# Patient Record
Sex: MALE | Race: BLACK OR AFRICAN AMERICAN | NOT HISPANIC OR LATINO | Employment: FULL TIME | URBAN - METROPOLITAN AREA
[De-identification: names, ages, dates, MRNs, and addresses within clinical notes are randomized per-mention and may not be internally consistent; named-entity substitution may affect disease eponyms.]

---

## 2017-03-23 ENCOUNTER — HISTORICAL (OUTPATIENT)
Dept: ADMINISTRATIVE | Facility: HOSPITAL | Age: 27
End: 2017-03-23

## 2017-07-11 ENCOUNTER — HISTORICAL (OUTPATIENT)
Dept: ADMINISTRATIVE | Facility: HOSPITAL | Age: 27
End: 2017-07-11

## 2017-07-11 LAB
ABS NEUT (OLG): 1.82 X10(3)/MCL (ref 2.1–9.2)
ABS NEUT (OLG): 1.88 X10(3)/MCL (ref 2.1–9.2)
ALBUMIN SERPL-MCNC: 4.2 GM/DL (ref 3.4–5)
ALBUMIN/GLOB SERPL: 1 RATIO (ref 1–2)
ALP SERPL-CCNC: 70 UNIT/L (ref 45–117)
ALT SERPL-CCNC: 19 UNIT/L (ref 12–78)
AST SERPL-CCNC: 26 UNIT/L (ref 15–37)
BASOPHILS # BLD AUTO: 0.04 X10(3)/MCL
BASOPHILS # BLD AUTO: 0.04 X10(3)/MCL
BASOPHILS NFR BLD AUTO: 1 % (ref 0–1)
BASOPHILS NFR BLD AUTO: 1 % (ref 0–1)
BILIRUB SERPL-MCNC: 0.9 MG/DL (ref 0.2–1)
BILIRUBIN DIRECT+TOT PNL SERPL-MCNC: 0.2 MG/DL
BILIRUBIN DIRECT+TOT PNL SERPL-MCNC: 0.7 MG/DL
BUN SERPL-MCNC: 11 MG/DL (ref 7–18)
CALCIUM SERPL-MCNC: 8.8 MG/DL (ref 8.5–10.1)
CD3+CD4+ CELLS # SPEC: 826 UNIT/L (ref 589–1505)
CD3+CD4+ CELLS NFR BLD: 35.9 % (ref 31–59)
CHLORIDE SERPL-SCNC: 104 MMOL/L (ref 98–107)
CO2 SERPL-SCNC: 28 MMOL/L (ref 21–32)
CREAT SERPL-MCNC: 1.2 MG/DL (ref 0.6–1.3)
EOSINOPHIL # BLD AUTO: 0.14 X10(3)/MCL
EOSINOPHIL # BLD AUTO: 0.16 10*3/UL
EOSINOPHIL NFR BLD AUTO: 3 % (ref 0–5)
EOSINOPHIL NFR BLD AUTO: 3 % (ref 0–5)
ERYTHROCYTE [DISTWIDTH] IN BLOOD BY AUTOMATED COUNT: 12.6 % (ref 11.5–14.5)
ERYTHROCYTE [DISTWIDTH] IN BLOOD BY AUTOMATED COUNT: 12.8 % (ref 11.5–14.5)
GLOBULIN SER-MCNC: 3.8 GM/ML (ref 2.3–3.5)
GLUCOSE SERPL-MCNC: 82 MG/DL (ref 74–106)
HCT VFR BLD AUTO: 43.6 % (ref 40–51)
HCT VFR BLD AUTO: 43.8 % (ref 40–51)
HGB BLD-MCNC: 15.1 GM/DL (ref 13.5–17.5)
HGB BLD-MCNC: 15.1 GM/DL (ref 13.5–17.5)
IMM GRANULOCYTES # BLD AUTO: 0.02 10*3/UL
IMM GRANULOCYTES NFR BLD AUTO: 0 %
LYMPHOCYTES # BLD AUTO: 2.33 X10(3)/MCL
LYMPHOCYTES # BLD AUTO: 2.38 X10(3)/MCL
LYMPHOCYTES # BLD AUTO: 2300 UNIT/L (ref 1260–5520)
LYMPHOCYTES NFR BLD AUTO: 50 % (ref 15–40)
LYMPHOCYTES NFR BLD AUTO: 50 % (ref 15–40)
LYMPHOCYTES NFR LN MANUAL: 50 % (ref 28–48)
LYMPHOMA - T-CELL MARKERS SPEC-IMP: ABNORMAL
MCH RBC QN AUTO: 31.4 PG (ref 26–34)
MCH RBC QN AUTO: 31.8 PG (ref 26–34)
MCHC RBC AUTO-ENTMCNC: 34.5 GM/DL (ref 31–37)
MCHC RBC AUTO-ENTMCNC: 34.6 GM/DL (ref 31–37)
MCV RBC AUTO: 91.1 FL (ref 80–100)
MCV RBC AUTO: 91.8 FL (ref 80–100)
MONOCYTES # BLD AUTO: 0.29 X10(3)/MCL
MONOCYTES # BLD AUTO: 0.32 X10(3)/MCL
MONOCYTES NFR BLD AUTO: 6 % (ref 4–12)
MONOCYTES NFR BLD AUTO: 7 % (ref 4–12)
NEUTROPHILS # BLD AUTO: 1.82 X10(3)/MCL
NEUTROPHILS # BLD AUTO: 1.88 X10(3)/MCL
NEUTROPHILS NFR BLD AUTO: 39 X10(3)/MCL
NEUTROPHILS NFR BLD AUTO: 39 X10(3)/MCL
PLATELET # BLD AUTO: 256 X10(3)/MCL (ref 130–400)
PLATELET # BLD AUTO: 259 X10(3)/MCL (ref 130–400)
PMV BLD AUTO: 9.4 FL (ref 7.4–10.4)
PMV BLD AUTO: 9.4 FL (ref 7.4–10.4)
POTASSIUM SERPL-SCNC: 4.3 MMOL/L (ref 3.5–5.1)
PROT SERPL-MCNC: 8 GM/DL (ref 6.4–8.2)
RBC # BLD AUTO: 4.75 X10(6)/MCL (ref 4.5–5.9)
RBC # BLD AUTO: 4.81 X10(6)/MCL (ref 4.5–5.9)
SODIUM SERPL-SCNC: 141 MMOL/L (ref 136–145)
WBC # BLD AUTO: 4600 /MM3 (ref 4500–11500)
WBC # SPEC AUTO: 4.6 X10(3)/MCL (ref 4.5–11)
WBC # SPEC AUTO: 4.8 X10(3)/MCL (ref 4.5–11)

## 2017-11-13 ENCOUNTER — HISTORICAL (OUTPATIENT)
Dept: ADMINISTRATIVE | Facility: HOSPITAL | Age: 27
End: 2017-11-13

## 2017-11-13 LAB
ABS NEUT (OLG): 2.32 X10(3)/MCL (ref 2.1–9.2)
ABS NEUT (OLG): 2.35 X10(3)/MCL (ref 2.1–9.2)
ALBUMIN SERPL-MCNC: 4 GM/DL (ref 3.4–5)
ALBUMIN/GLOB SERPL: 1 RATIO (ref 1–2)
ALP SERPL-CCNC: 106 UNIT/L (ref 45–117)
ALT SERPL-CCNC: 22 UNIT/L (ref 12–78)
AST SERPL-CCNC: 23 UNIT/L (ref 15–37)
BASOPHILS # BLD AUTO: 0.03 X10(3)/MCL
BASOPHILS # BLD AUTO: 0.04 X10(3)/MCL
BASOPHILS NFR BLD AUTO: 1 % (ref 0–1)
BASOPHILS NFR BLD AUTO: 1 % (ref 0–1)
BILIRUB SERPL-MCNC: 0.9 MG/DL (ref 0.2–1)
BILIRUBIN DIRECT+TOT PNL SERPL-MCNC: 0.2 MG/DL
BILIRUBIN DIRECT+TOT PNL SERPL-MCNC: 0.7 MG/DL
BUN SERPL-MCNC: 14 MG/DL (ref 7–18)
CALCIUM SERPL-MCNC: 9.1 MG/DL (ref 8.5–10.1)
CD3+CD4+ CELLS # SPEC: 714 UNIT/L (ref 589–1505)
CD3+CD4+ CELLS NFR BLD: 36.3 % (ref 31–59)
CHLORIDE SERPL-SCNC: 105 MMOL/L (ref 98–107)
CO2 SERPL-SCNC: 28 MMOL/L (ref 21–32)
CREAT SERPL-MCNC: 1.2 MG/DL (ref 0.6–1.3)
EOSINOPHIL # BLD AUTO: 0.08 10*3/UL
EOSINOPHIL # BLD AUTO: 0.08 X10(3)/MCL
EOSINOPHIL NFR BLD AUTO: 2 % (ref 0–5)
EOSINOPHIL NFR BLD AUTO: 2 % (ref 0–5)
ERYTHROCYTE [DISTWIDTH] IN BLOOD BY AUTOMATED COUNT: 12.3 % (ref 11.5–14.5)
ERYTHROCYTE [DISTWIDTH] IN BLOOD BY AUTOMATED COUNT: 12.5 % (ref 11.5–14.5)
GLOBULIN SER-MCNC: 4.2 GM/ML (ref 2.3–3.5)
GLUCOSE SERPL-MCNC: 111 MG/DL (ref 74–106)
HCT VFR BLD AUTO: 43.7 % (ref 40–51)
HCT VFR BLD AUTO: 44.5 % (ref 40–51)
HGB BLD-MCNC: 15.1 GM/DL (ref 13.5–17.5)
HGB BLD-MCNC: 15.2 GM/DL (ref 13.5–17.5)
IMM GRANULOCYTES # BLD AUTO: 0.01 10*3/UL
IMM GRANULOCYTES # BLD AUTO: 0.01 10*3/UL
IMM GRANULOCYTES NFR BLD AUTO: 0 %
IMM GRANULOCYTES NFR BLD AUTO: 0 %
LYMPHOCYTES # BLD AUTO: 1.94 X10(3)/MCL
LYMPHOCYTES # BLD AUTO: 1.97 X10(3)/MCL
LYMPHOCYTES # BLD AUTO: 1968 UNIT/L (ref 1260–5520)
LYMPHOCYTES NFR BLD AUTO: 41 % (ref 15–40)
LYMPHOCYTES NFR BLD AUTO: 41 % (ref 15–40)
LYMPHOCYTES NFR LN MANUAL: 41 % (ref 28–48)
LYMPHOMA - T-CELL MARKERS SPEC-IMP: NORMAL
MCH RBC QN AUTO: 31.7 PG (ref 26–34)
MCH RBC QN AUTO: 32.4 PG (ref 26–34)
MCHC RBC AUTO-ENTMCNC: 33.9 GM/DL (ref 31–37)
MCHC RBC AUTO-ENTMCNC: 34.8 GM/DL (ref 31–37)
MCV RBC AUTO: 93.2 FL (ref 80–100)
MCV RBC AUTO: 93.3 FL (ref 80–100)
MONOCYTES # BLD AUTO: 0.37 X10(3)/MCL
MONOCYTES # BLD AUTO: 0.37 X10(3)/MCL
MONOCYTES NFR BLD AUTO: 8 % (ref 4–12)
MONOCYTES NFR BLD AUTO: 8 % (ref 4–12)
NEUTROPHILS # BLD AUTO: 2.32 X10(3)/MCL
NEUTROPHILS # BLD AUTO: 2.35 X10(3)/MCL
NEUTROPHILS NFR BLD AUTO: 49 X10(3)/MCL
NEUTROPHILS NFR BLD AUTO: 49 X10(3)/MCL
PLATELET # BLD AUTO: 322 X10(3)/MCL (ref 130–400)
PLATELET # BLD AUTO: 333 X10(3)/MCL (ref 130–400)
PMV BLD AUTO: 9.1 FL (ref 7.4–10.4)
PMV BLD AUTO: 9.2 FL (ref 7.4–10.4)
POTASSIUM SERPL-SCNC: 4 MMOL/L (ref 3.5–5.1)
PROT SERPL-MCNC: 8.2 GM/DL (ref 6.4–8.2)
RBC # BLD AUTO: 4.69 X10(6)/MCL (ref 4.5–5.9)
RBC # BLD AUTO: 4.77 X10(6)/MCL (ref 4.5–5.9)
SODIUM SERPL-SCNC: 138 MMOL/L (ref 136–145)
WBC # BLD AUTO: 4800 /MM3 (ref 4500–11500)
WBC # SPEC AUTO: 4.8 X10(3)/MCL (ref 4.5–11)
WBC # SPEC AUTO: 4.8 X10(3)/MCL (ref 4.5–11)

## 2018-04-03 ENCOUNTER — HISTORICAL (OUTPATIENT)
Dept: ADMINISTRATIVE | Facility: HOSPITAL | Age: 28
End: 2018-04-03

## 2018-04-03 LAB
ABS NEUT (OLG): 1.85 X10(3)/MCL (ref 2.1–9.2)
ABS NEUT (OLG): 1.96 X10(3)/MCL (ref 2.1–9.2)
ALBUMIN SERPL-MCNC: 3.9 GM/DL (ref 3.4–5)
ALBUMIN/GLOB SERPL: 1 RATIO (ref 1–2)
ALP SERPL-CCNC: 92 UNIT/L (ref 45–117)
ALT SERPL-CCNC: 39 UNIT/L (ref 12–78)
APPEARANCE, UA: CLEAR
AST SERPL-CCNC: 30 UNIT/L (ref 15–37)
BACTERIA #/AREA URNS AUTO: ABNORMAL /[HPF]
BASOPHILS # BLD AUTO: 0.04 X10(3)/MCL
BASOPHILS # BLD AUTO: 0.04 X10(3)/MCL
BASOPHILS NFR BLD AUTO: 1 %
BASOPHILS NFR BLD AUTO: 1 %
BILIRUB SERPL-MCNC: 0.9 MG/DL (ref 0.2–1)
BILIRUB UR QL STRIP: NEGATIVE
BILIRUBIN DIRECT+TOT PNL SERPL-MCNC: 0.3 MG/DL
BILIRUBIN DIRECT+TOT PNL SERPL-MCNC: 0.6 MG/DL
BUN SERPL-MCNC: 13 MG/DL (ref 7–18)
CALCIUM SERPL-MCNC: 8.3 MG/DL (ref 8.5–10.1)
CD3+CD4+ CELLS # SPEC: 665 UNIT/L (ref 589–1505)
CD3+CD4+ CELLS NFR BLD: 29.5 % (ref 31–59)
CHLORIDE SERPL-SCNC: 109 MMOL/L (ref 98–107)
CHOLEST SERPL-MCNC: 124 MG/DL
CHOLEST/HDLC SERPL: 2.2 {RATIO} (ref 0–5)
CO2 SERPL-SCNC: 29 MMOL/L (ref 21–32)
COLOR UR: ABNORMAL
CREAT SERPL-MCNC: 1.2 MG/DL (ref 0.6–1.3)
DEPRECATED CALCIDIOL+CALCIFEROL SERPL-MC: 24.66 NG/ML (ref 30–80)
EOSINOPHIL # BLD AUTO: 0.1 X10(3)/MCL
EOSINOPHIL # BLD AUTO: 0.1 X10(3)/MCL
EOSINOPHIL NFR BLD AUTO: 2 %
EOSINOPHIL NFR BLD AUTO: 2 %
ERYTHROCYTE [DISTWIDTH] IN BLOOD BY AUTOMATED COUNT: 12.9 % (ref 11.5–14.5)
ERYTHROCYTE [DISTWIDTH] IN BLOOD BY AUTOMATED COUNT: 13 % (ref 11.5–14.5)
GLOBULIN SER-MCNC: 4.1 GM/ML (ref 2.3–3.5)
GLUCOSE (UA): NORMAL
GLUCOSE SERPL-MCNC: 91 MG/DL (ref 74–106)
HCT VFR BLD AUTO: 43.6 % (ref 40–51)
HCT VFR BLD AUTO: 43.9 % (ref 40–51)
HDLC SERPL-MCNC: 57 MG/DL
HGB BLD-MCNC: 14.8 GM/DL (ref 13.5–17.5)
HGB BLD-MCNC: 14.9 GM/DL (ref 13.5–17.5)
HGB UR QL STRIP: NEGATIVE
HYALINE CASTS #/AREA URNS LPF: ABNORMAL /[LPF]
IMM GRANULOCYTES # BLD AUTO: 0.01 10*3/UL
IMM GRANULOCYTES # BLD AUTO: 0.01 10*3/UL
IMM GRANULOCYTES NFR BLD AUTO: 0 %
IMM GRANULOCYTES NFR BLD AUTO: 0 %
KETONES UR QL STRIP: NEGATIVE
LDLC SERPL CALC-MCNC: 58 MG/DL (ref 0–130)
LEUKOCYTE ESTERASE UR QL STRIP: NEGATIVE
LYMPHOCYTES # BLD AUTO: 2.15 X10(3)/MCL
LYMPHOCYTES # BLD AUTO: 2.24 X10(3)/MCL
LYMPHOCYTES # BLD AUTO: 2254 UNIT/L (ref 1260–5520)
LYMPHOCYTES NFR BLD AUTO: 48 % (ref 13–40)
LYMPHOCYTES NFR BLD AUTO: 49 % (ref 13–40)
LYMPHOCYTES NFR LN MANUAL: 49 % (ref 28–48)
LYMPHOMA - T-CELL MARKERS SPEC-IMP: ABNORMAL
MCH RBC QN AUTO: 31.8 PG (ref 26–34)
MCH RBC QN AUTO: 31.8 PG (ref 26–34)
MCHC RBC AUTO-ENTMCNC: 33.9 GM/DL (ref 31–37)
MCHC RBC AUTO-ENTMCNC: 33.9 GM/DL (ref 31–37)
MCV RBC AUTO: 93.8 FL (ref 80–100)
MCV RBC AUTO: 93.8 FL (ref 80–100)
MONOCYTES # BLD AUTO: 0.26 X10(3)/MCL
MONOCYTES # BLD AUTO: 0.29 X10(3)/MCL
MONOCYTES NFR BLD AUTO: 6 % (ref 4–12)
MONOCYTES NFR BLD AUTO: 6 % (ref 4–12)
NEG CONT SPOT COUNT: NORMAL
NEUTROPHILS # BLD AUTO: 1.85 X10(3)/MCL
NEUTROPHILS # BLD AUTO: 1.96 X10(3)/MCL
NEUTROPHILS NFR BLD AUTO: 42 X10(3)/MCL
NEUTROPHILS NFR BLD AUTO: 42 X10(3)/MCL
NITRITE UR QL STRIP: NEGATIVE
PANEL B SPOT COUNT: 0
PH UR STRIP: 7 [PH] (ref 4.5–8)
PLATELET # BLD AUTO: 267 X10(3)/MCL (ref 130–400)
PLATELET # BLD AUTO: 276 X10(3)/MCL (ref 130–400)
PMV BLD AUTO: 9.4 FL (ref 7.4–10.4)
PMV BLD AUTO: 9.4 FL (ref 7.4–10.4)
POS CONT SPOT COUNT: NORMAL
POTASSIUM SERPL-SCNC: 4.3 MMOL/L (ref 3.5–5.1)
PROT SERPL-MCNC: 8 GM/DL (ref 6.4–8.2)
PROT UR QL STRIP: NEGATIVE
RBC # BLD AUTO: 4.65 X10(6)/MCL (ref 4.5–5.9)
RBC # BLD AUTO: 4.68 X10(6)/MCL (ref 4.5–5.9)
RBC #/AREA URNS AUTO: ABNORMAL /[HPF]
SODIUM SERPL-SCNC: 142 MMOL/L (ref 136–145)
SP GR UR STRIP: 1.02 (ref 1–1.03)
SQUAMOUS #/AREA URNS LPF: ABNORMAL /[LPF]
T PALLIDUM AB SER QL: NONREACTIVE
T-SPOT.TB: NEGATIVE
TRIGL SERPL-MCNC: 44 MG/DL
TSH SERPL-ACNC: 2.6 MIU/L (ref 0.36–3.74)
UROBILINOGEN UR STRIP-ACNC: 2 MG/DL
VLDLC SERPL CALC-MCNC: 9 MG/DL
WBC # BLD AUTO: 4600 /MM3 (ref 4500–11500)
WBC # SPEC AUTO: 4.4 X10(3)/MCL (ref 4.5–11)
WBC # SPEC AUTO: 4.6 X10(3)/MCL (ref 4.5–11)
WBC #/AREA URNS AUTO: ABNORMAL /HPF

## 2019-02-27 ENCOUNTER — HISTORICAL (OUTPATIENT)
Dept: RADIOLOGY | Facility: HOSPITAL | Age: 29
End: 2019-02-27

## 2019-03-01 ENCOUNTER — HISTORICAL (OUTPATIENT)
Dept: ADMINISTRATIVE | Facility: HOSPITAL | Age: 29
End: 2019-03-01

## 2019-03-01 LAB
ABS NEUT (OLG): 2.89 X10(3)/MCL (ref 2.1–9.2)
ALBUMIN SERPL-MCNC: 3.2 GM/DL (ref 3.4–5)
ALBUMIN/GLOB SERPL: 0.6 RATIO (ref 1.1–2)
ALP SERPL-CCNC: 372 UNIT/L (ref 45–117)
ALT SERPL-CCNC: 420 UNIT/L (ref 12–78)
AMPHET UR QL SCN: NEGATIVE
APAP SERPL-MCNC: <2 MCG/ML (ref 10–30)
APPEARANCE, UA: CLEAR
AST SERPL-CCNC: 253 UNIT/L (ref 15–37)
BACTERIA #/AREA URNS AUTO: ABNORMAL /[HPF]
BARBITURATE SCN PRESENT UR: NEGATIVE
BASOPHILS # BLD AUTO: 0.02 X10(3)/MCL
BASOPHILS NFR BLD AUTO: 0 %
BENZODIAZ UR QL SCN: NEGATIVE
BILIRUB SERPL-MCNC: 1 MG/DL (ref 0.2–1)
BILIRUB UR QL STRIP: NEGATIVE
BILIRUBIN DIRECT+TOT PNL SERPL-MCNC: 0.3 MG/DL
BILIRUBIN DIRECT+TOT PNL SERPL-MCNC: 0.7 MG/DL
BUN SERPL-MCNC: 10 MG/DL (ref 7–18)
CALCIUM SERPL-MCNC: 8.9 MG/DL (ref 8.5–10.1)
CANNABINOIDS UR QL SCN: NEGATIVE
CHLORIDE SERPL-SCNC: 102 MMOL/L (ref 98–107)
CHOLEST SERPL-MCNC: 155 MG/DL
CHOLEST/HDLC SERPL: 5.5 {RATIO} (ref 0–5)
CO2 SERPL-SCNC: 27 MMOL/L (ref 21–32)
COCAINE UR QL SCN: NEGATIVE
COLOR UR: YELLOW
CREAT SERPL-MCNC: 1.1 MG/DL (ref 0.6–1.3)
CREAT UR-MCNC: 320 MG/DL
CRP SERPL-MCNC: 3.9 MG/DL
DEPRECATED CALCIDIOL+CALCIFEROL SERPL-MC: 11.56 NG/ML (ref 30–80)
EOSINOPHIL # BLD AUTO: 0.04 X10(3)/MCL
EOSINOPHIL NFR BLD AUTO: 1 %
ERYTHROCYTE [DISTWIDTH] IN BLOOD BY AUTOMATED COUNT: 14.1 % (ref 11.5–14.5)
ERYTHROCYTE [SEDIMENTATION RATE] IN BLOOD: 58 MM/HR (ref 0–15)
FERRITIN SERPL-MCNC: 217.4 NG/ML (ref 26–388)
GGT SERPL-CCNC: 235 UNIT/L (ref 5–85)
GLOBULIN SER-MCNC: 5.6 GM/ML (ref 2.3–3.5)
GLUCOSE (UA): NORMAL
GLUCOSE SERPL-MCNC: 81 MG/DL (ref 74–106)
HAV IGM SERPL QL IA: NONREACTIVE
HBV CORE IGM SERPL QL IA: NONREACTIVE
HBV SURFACE AG SERPL QL IA: NEGATIVE
HCT VFR BLD AUTO: 41.9 % (ref 40–51)
HCV AB SERPL QL IA: NONREACTIVE
HCV AB SERPL QL IA: NONREACTIVE
HDLC SERPL-MCNC: 28 MG/DL
HGB BLD-MCNC: 13.5 GM/DL (ref 13.5–17.5)
HGB UR QL STRIP: 0.1 MG/DL
HYALINE CASTS #/AREA URNS LPF: ABNORMAL /[LPF]
IMM GRANULOCYTES # BLD AUTO: 0.01 10*3/UL
IMM GRANULOCYTES NFR BLD AUTO: 0 %
IRON SATN MFR SERPL: 14 % (ref 15–50)
IRON SERPL-MCNC: 42 MCG/DL (ref 65–175)
KETONES UR QL STRIP: NEGATIVE
LDLC SERPL CALC-MCNC: 109 MG/DL (ref 0–130)
LEUKOCYTE ESTERASE UR QL STRIP: NEGATIVE
LIPASE SERPL-CCNC: 116 UNIT/L (ref 73–393)
LYMPHOCYTES # BLD AUTO: 1.72 X10(3)/MCL
LYMPHOCYTES NFR BLD AUTO: 34 % (ref 13–40)
MCH RBC QN AUTO: 28.4 PG (ref 26–34)
MCHC RBC AUTO-ENTMCNC: 32.2 GM/DL (ref 31–37)
MCV RBC AUTO: 88.2 FL (ref 80–100)
MONOCYTES # BLD AUTO: 0.43 X10(3)/MCL
MONOCYTES NFR BLD AUTO: 8 % (ref 4–12)
NEG CONT SPOT COUNT: NORMAL
NEUTROPHILS # BLD AUTO: 2.89 X10(3)/MCL
NEUTROPHILS NFR BLD AUTO: 56 X10(3)/MCL
NITRITE UR QL STRIP: NEGATIVE
OPIATES UR QL SCN: NEGATIVE
PANEL A SPOT COUNT: 0
PANEL B SPOT COUNT: 0
PCP UR QL: NEGATIVE
PH UR STRIP: 6.5 [PH] (ref 4.5–8)
PLATELET # BLD AUTO: 330 X10(3)/MCL (ref 130–400)
PMV BLD AUTO: 9.1 FL (ref 7.4–10.4)
POS CONT SPOT COUNT: NORMAL
POTASSIUM SERPL-SCNC: 4.5 MMOL/L (ref 3.5–5.1)
PROT SERPL-MCNC: 8.8 GM/DL (ref 6.4–8.2)
PROT UR QL STRIP: 300 MG/DL
PROT UR STRIP-MCNC: 605.6 MG/DL
RBC # BLD AUTO: 4.75 X10(6)/MCL (ref 4.5–5.9)
RBC #/AREA URNS AUTO: ABNORMAL /[HPF]
RET# (OHS): 0.08 X10(6)/MCL (ref 0.02–0.09)
RETICULOCYTE COUNT AUTOMATED (OLG): 1.9 % (ref 0.5–1.5)
RPR SER QL: REACTIVE
SALICYLATES SERPL-MCNC: <1.7 MG/DL (ref 2.8–20)
SODIUM SERPL-SCNC: 134 MMOL/L (ref 136–145)
SP GR UR STRIP: 1.02 (ref 1–1.03)
SQUAMOUS #/AREA URNS LPF: ABNORMAL /[LPF]
T PALLIDUM AB SER QL: REACTIVE
T-SPOT.TB: NORMAL
TIBC SERPL-MCNC: 301 MCG/DL (ref 250–450)
TRANSFERRIN SERPL-MCNC: 235 MG/DL (ref 200–360)
TRIGL SERPL-MCNC: 91 MG/DL
TSH SERPL-ACNC: 2.81 MIU/L (ref 0.36–3.74)
UROBILINOGEN UR STRIP-ACNC: 3 MG/DL
VLDLC SERPL CALC-MCNC: 18 MG/DL
WBC # SPEC AUTO: 5.1 X10(3)/MCL (ref 4.5–11)
WBC #/AREA URNS AUTO: ABNORMAL /HPF

## 2019-03-22 ENCOUNTER — HISTORICAL (OUTPATIENT)
Dept: RADIOLOGY | Facility: HOSPITAL | Age: 29
End: 2019-03-22

## 2019-03-25 ENCOUNTER — HISTORICAL (OUTPATIENT)
Dept: ADMINISTRATIVE | Facility: HOSPITAL | Age: 29
End: 2019-03-25

## 2019-03-25 LAB
ABS NEUT (OLG): 1.18 X10(3)/MCL (ref 2.1–9.2)
ALBUMIN SERPL-MCNC: 3.2 GM/DL (ref 3.4–5)
ALBUMIN/GLOB SERPL: 0.6 RATIO (ref 1.1–2)
ALP SERPL-CCNC: 151 UNIT/L (ref 45–117)
ALT SERPL-CCNC: 47 UNIT/L (ref 12–78)
AST SERPL-CCNC: 29 UNIT/L (ref 15–37)
BASOPHILS # BLD AUTO: 0.04 X10(3)/MCL
BASOPHILS NFR BLD AUTO: 1 %
BILIRUB SERPL-MCNC: 0.7 MG/DL (ref 0.2–1)
BILIRUBIN DIRECT+TOT PNL SERPL-MCNC: 0.2 MG/DL
BILIRUBIN DIRECT+TOT PNL SERPL-MCNC: 0.5 MG/DL
BUN SERPL-MCNC: 12 MG/DL (ref 7–18)
CALCIUM SERPL-MCNC: 8.9 MG/DL (ref 8.5–10.1)
CD3+CD4+ CELLS # SPEC: 529 UNIT/L (ref 589–1505)
CD3+CD4+ CELLS NFR BLD: 22.6 % (ref 31–59)
CHLORIDE SERPL-SCNC: 104 MMOL/L (ref 98–107)
CO2 SERPL-SCNC: 31 MMOL/L (ref 21–32)
CREAT SERPL-MCNC: 1.1 MG/DL (ref 0.6–1.3)
EOSINOPHIL # BLD AUTO: 0.1 10*3/UL
EOSINOPHIL NFR BLD AUTO: 2 %
ERYTHROCYTE [DISTWIDTH] IN BLOOD BY AUTOMATED COUNT: 12.7 % (ref 11.5–14.5)
GLOBULIN SER-MCNC: 5.1 GM/ML (ref 2.3–3.5)
GLUCOSE SERPL-MCNC: 94 MG/DL (ref 74–106)
HCT VFR BLD AUTO: 45.3 % (ref 40–51)
HGB BLD-MCNC: 14.6 GM/DL (ref 13.5–17.5)
IMM GRANULOCYTES # BLD AUTO: 0.01 10*3/UL
IMM GRANULOCYTES NFR BLD AUTO: 0 %
LYMPHOCYTES # BLD AUTO: 2.35 X10(3)/MCL
LYMPHOCYTES # BLD AUTO: 2340 UNIT/L (ref 1260–5520)
LYMPHOCYTES NFR BLD AUTO: 60 % (ref 13–40)
LYMPHOCYTES NFR LN MANUAL: 60 % (ref 28–48)
LYMPHOMA - T-CELL MARKERS SPEC-IMP: ABNORMAL
MCH RBC QN AUTO: 27.9 PG (ref 26–34)
MCHC RBC AUTO-ENTMCNC: 32.2 GM/DL (ref 31–37)
MCV RBC AUTO: 86.6 FL (ref 80–100)
MONOCYTES # BLD AUTO: 0.26 X10(3)/MCL
MONOCYTES NFR BLD AUTO: 7 % (ref 4–12)
NEUTROPHILS # BLD AUTO: 1.18 X10(3)/MCL
NEUTROPHILS NFR BLD AUTO: 30 X10(3)/MCL
PLATELET # BLD AUTO: 296 X10(3)/MCL (ref 130–400)
PMV BLD AUTO: 9.5 FL (ref 7.4–10.4)
POTASSIUM SERPL-SCNC: 4.2 MMOL/L (ref 3.5–5.1)
PROT SERPL-MCNC: 8.3 GM/DL (ref 6.4–8.2)
RBC # BLD AUTO: 5.23 X10(6)/MCL (ref 4.5–5.9)
SODIUM SERPL-SCNC: 136 MMOL/L (ref 136–145)
WBC # BLD AUTO: 3900 /MM3 (ref 4500–11500)
WBC # SPEC AUTO: 3.9 X10(3)/MCL (ref 4.5–11)

## 2019-11-04 ENCOUNTER — HISTORICAL (OUTPATIENT)
Dept: ADMINISTRATIVE | Facility: HOSPITAL | Age: 29
End: 2019-11-04

## 2019-11-04 LAB
ABS NEUT (OLG): 2.36 X10(3)/MCL (ref 2.1–9.2)
ALBUMIN SERPL-MCNC: 4.1 GM/DL (ref 3.4–5)
ALBUMIN/GLOB SERPL: 0.8 RATIO (ref 1.1–2)
ALP SERPL-CCNC: 88 UNIT/L (ref 45–117)
ALT SERPL-CCNC: 21 UNIT/L (ref 12–78)
AST SERPL-CCNC: 22 UNIT/L (ref 15–37)
BASOPHILS # BLD AUTO: 0 X10(3)/MCL (ref 0–0.2)
BASOPHILS NFR BLD AUTO: 0 %
BILIRUB SERPL-MCNC: 0.8 MG/DL (ref 0.2–1)
BILIRUBIN DIRECT+TOT PNL SERPL-MCNC: 0.2 MG/DL (ref 0–0.2)
BILIRUBIN DIRECT+TOT PNL SERPL-MCNC: 0.6 MG/DL
BUN SERPL-MCNC: 14 MG/DL (ref 7–18)
CALCIUM SERPL-MCNC: 9.1 MG/DL (ref 8.5–10.1)
CD3+CD4+ CELLS # SPEC: 376 UNIT/L (ref 589–1505)
CD3+CD4+ CELLS NFR BLD: 21.4 % (ref 31–59)
CHLORIDE SERPL-SCNC: 107 MMOL/L (ref 98–107)
CO2 SERPL-SCNC: 29 MMOL/L (ref 21–32)
CREAT SERPL-MCNC: 1.2 MG/DL (ref 0.6–1.3)
EOSINOPHIL # BLD AUTO: 0.1 X10(3)/MCL (ref 0–0.9)
EOSINOPHIL NFR BLD AUTO: 2 %
ERYTHROCYTE [DISTWIDTH] IN BLOOD BY AUTOMATED COUNT: 12.5 % (ref 11.5–14.5)
GLOBULIN SER-MCNC: 5 GM/ML (ref 2.3–3.5)
GLUCOSE SERPL-MCNC: 92 MG/DL (ref 74–106)
HCT VFR BLD AUTO: 48.1 % (ref 40–51)
HGB BLD-MCNC: 15.3 GM/DL (ref 13.5–17.5)
LYMPHOCYTES # BLD AUTO: 1.8 X10(3)/MCL (ref 0.6–4.6)
LYMPHOCYTES # BLD AUTO: 1755 UNIT/L (ref 1260–5520)
LYMPHOCYTES NFR BLD AUTO: 39 %
LYMPHOCYTES NFR LN MANUAL: 39 % (ref 28–48)
LYMPHOMA - T-CELL MARKERS SPEC-IMP: ABNORMAL
MCH RBC QN AUTO: 28.8 PG (ref 26–34)
MCHC RBC AUTO-ENTMCNC: 31.8 GM/DL (ref 31–37)
MCV RBC AUTO: 90.4 FL (ref 80–100)
MONOCYTES # BLD AUTO: 0.3 X10(3)/MCL (ref 0.1–1.3)
MONOCYTES NFR BLD AUTO: 6 %
NEUTROPHILS # BLD AUTO: 2.36 X10(3)/MCL (ref 2.1–9.2)
NEUTROPHILS NFR BLD AUTO: 52 %
PLATELET # BLD AUTO: 265 X10(3)/MCL (ref 130–400)
PMV BLD AUTO: 9.4 FL (ref 7.4–10.4)
POTASSIUM SERPL-SCNC: 4 MMOL/L (ref 3.5–5.1)
PROT SERPL-MCNC: 9.1 GM/DL (ref 6.4–8.2)
RBC # BLD AUTO: 5.32 X10(6)/MCL (ref 4.5–5.9)
RPR SER QL: REACTIVE
SODIUM SERPL-SCNC: 139 MMOL/L (ref 136–145)
T PALLIDUM AB SER QL: REACTIVE
WBC # BLD AUTO: 4500 /MM3 (ref 4500–11500)
WBC # SPEC AUTO: 4.5 X10(3)/MCL (ref 4.5–11)

## 2020-10-15 ENCOUNTER — HISTORICAL (OUTPATIENT)
Dept: ADMINISTRATIVE | Facility: HOSPITAL | Age: 30
End: 2020-10-15

## 2020-10-15 LAB
ABS NEUT (OLG): 0.91 X10(3)/MCL (ref 2.1–9.2)
ALBUMIN SERPL-MCNC: 3.6 GM/DL (ref 3.4–5)
ALBUMIN/GLOB SERPL: 0.7 RATIO (ref 1.1–2)
ALP SERPL-CCNC: 82 UNIT/L (ref 45–117)
ALT SERPL-CCNC: 20 UNIT/L (ref 12–78)
APPEARANCE, UA: ABNORMAL
AST SERPL-CCNC: 20 UNIT/L (ref 15–37)
BACTERIA #/AREA URNS AUTO: ABNORMAL /HPF
BASOPHILS NFR BLD MANUAL: 0 %
BILIRUB SERPL-MCNC: 0.4 MG/DL (ref 0.2–1)
BILIRUB UR QL STRIP: NEGATIVE
BILIRUBIN DIRECT+TOT PNL SERPL-MCNC: 0.2 MG/DL
BILIRUBIN DIRECT+TOT PNL SERPL-MCNC: 0.2 MG/DL (ref 0–0.2)
BUN SERPL-MCNC: 6 MG/DL (ref 7–18)
CALCIUM SERPL-MCNC: 8.4 MG/DL (ref 8.5–10.1)
CD3+CD4+ CELLS # SPEC: 158 UNIT/L (ref 589–1505)
CD3+CD4+ CELLS NFR BLD: 11.7 % (ref 31–59)
CHLORIDE SERPL-SCNC: 105 MMOL/L (ref 98–107)
CHOLEST SERPL-MCNC: 95 MG/DL
CHOLEST/HDLC SERPL: 3.5 {RATIO} (ref 0–5)
CO2 SERPL-SCNC: 31 MMOL/L (ref 21–32)
COLOR UR: YELLOW
CREAT SERPL-MCNC: 1 MG/DL (ref 0.6–1.3)
DEPRECATED CALCIDIOL+CALCIFEROL SERPL-MC: 27.5 NG/ML (ref 30–80)
EOSINOPHIL NFR BLD MANUAL: 6 %
ERYTHROCYTE [DISTWIDTH] IN BLOOD BY AUTOMATED COUNT: 12.6 % (ref 11.5–14.5)
EST. AVERAGE GLUCOSE BLD GHB EST-MCNC: 131 MG/DL
GLOBULIN SER-MCNC: 5.2 GM/ML (ref 2.3–3.5)
GLUCOSE (UA): NEGATIVE
GLUCOSE SERPL-MCNC: 90 MG/DL (ref 74–106)
GRANULOCYTES NFR BLD MANUAL: 33 % (ref 43–75)
HBA1C MFR BLD: 6.2 % (ref 4.2–6.3)
HBV SURFACE AB SER-ACNC: 456.28 M[IU]/ML
HBV SURFACE AB SERPL IA-ACNC: REACTIVE M[IU]/ML
HBV SURFACE AG SERPL QL IA: NONREACTIVE
HCT VFR BLD AUTO: 37 % (ref 40–51)
HCV AB SERPL QL IA: NONREACTIVE
HDLC SERPL-MCNC: 27 MG/DL (ref 40–59)
HGB BLD-MCNC: 12.2 GM/DL (ref 13.5–17.5)
HGB UR QL STRIP: 0.03 MG/DL
HYALINE CASTS #/AREA URNS LPF: ABNORMAL /LPF
KETONES UR QL STRIP: ABNORMAL
LDLC SERPL CALC-MCNC: 36 MG/DL
LEUKOCYTE ESTERASE UR QL STRIP: 500 LEU/UL
LYMPHOCYTES # BLD AUTO: 1350 UNIT/L (ref 1260–5520)
LYMPHOCYTES NFR BLD MANUAL: 2 %
LYMPHOCYTES NFR BLD MANUAL: 50 % (ref 20.5–51.1)
LYMPHOCYTES NFR LN MANUAL: 50 % (ref 28–48)
LYMPHOMA - T-CELL MARKERS SPEC-IMP: ABNORMAL
MCH RBC QN AUTO: 28.8 PG (ref 26–34)
MCHC RBC AUTO-ENTMCNC: 33 GM/DL (ref 31–37)
MCV RBC AUTO: 87.3 FL (ref 80–100)
MONOCYTES NFR BLD MANUAL: 9 % (ref 2–9)
NEG CONT SPOT COUNT: NORMAL
NITRITE UR QL STRIP: NEGATIVE
PANEL A SPOT COUNT: 0
PANEL B SPOT COUNT: 0
PH UR STRIP: 6 [PH] (ref 4.5–8)
PLATELET # BLD AUTO: 287 X10(3)/MCL (ref 130–400)
PLATELET # BLD EST: ADEQUATE 10*3/UL
PMV BLD AUTO: 9.8 FL (ref 7.4–10.4)
POIKILOCYTOSIS BLD QL SMEAR: ABNORMAL
POS CONT SPOT COUNT: NORMAL
POTASSIUM SERPL-SCNC: 4 MMOL/L (ref 3.5–5.1)
PROT SERPL-MCNC: 8.8 GM/DL (ref 6.4–8.2)
PROT UR QL STRIP: 30 MG/DL
RBC # BLD AUTO: 4.24 X10(6)/MCL (ref 4.5–5.9)
RBC #/AREA URNS AUTO: ABNORMAL /HPF
RBC MORPH BLD: ABNORMAL
RPR SER QL: REACTIVE
SODIUM SERPL-SCNC: 138 MMOL/L (ref 136–145)
SP GR UR STRIP: 1.03 (ref 1–1.03)
SQUAMOUS #/AREA URNS LPF: ABNORMAL /LPF
T PALLIDUM AB SER QL: REACTIVE
T-SPOT.TB: NORMAL
T4 FREE SERPL-MCNC: 1.1 NG/DL (ref 0.76–1.46)
TRIGL SERPL-MCNC: 158 MG/DL
TSH SERPL-ACNC: 3.76 MIU/L (ref 0.36–3.74)
UROBILINOGEN UR STRIP-ACNC: 4 MG/DL
VLDLC SERPL CALC-MCNC: 32 MG/DL
WBC # BLD AUTO: 2700 /MM3 (ref 4500–11500)
WBC # SPEC AUTO: 2.7 X10(3)/MCL (ref 4.5–11)
WBC #/AREA URNS AUTO: >=100 /HPF

## 2020-10-17 LAB — FINAL CULTURE: NO GROWTH

## 2021-10-05 ENCOUNTER — HISTORICAL (OUTPATIENT)
Dept: ADMINISTRATIVE | Facility: HOSPITAL | Age: 31
End: 2021-10-05

## 2021-10-05 LAB
ABS NEUT (OLG): 2.79 X10(3)/MCL (ref 2.1–9.2)
ALBUMIN SERPL-MCNC: 4 GM/DL (ref 3.5–5)
ALBUMIN/GLOB SERPL: 1 RATIO (ref 1.1–2)
ALP SERPL-CCNC: 71 UNIT/L (ref 40–150)
ALT SERPL-CCNC: 20 UNIT/L (ref 0–55)
APPEARANCE, UA: CLEAR
AST SERPL-CCNC: 38 UNIT/L (ref 5–34)
BACTERIA #/AREA URNS AUTO: ABNORMAL /HPF
BASOPHILS # BLD AUTO: 0 X10(3)/MCL (ref 0–0.2)
BASOPHILS NFR BLD AUTO: 0 %
BILIRUB SERPL-MCNC: 0.6 MG/DL
BILIRUB UR QL STRIP: NEGATIVE
BILIRUBIN DIRECT+TOT PNL SERPL-MCNC: 0.3 MG/DL (ref 0–0.5)
BILIRUBIN DIRECT+TOT PNL SERPL-MCNC: 0.3 MG/DL (ref 0–0.8)
BUN SERPL-MCNC: 11.3 MG/DL (ref 8.9–20.6)
CALCIUM SERPL-MCNC: 9.2 MG/DL (ref 8.4–10.2)
CD3+CD4+ CELLS # SPEC: 13 UNIT/L (ref 589–1505)
CD3+CD4+ CELLS NFR BLD: 3 % (ref 31–59)
CHLORIDE SERPL-SCNC: 107 MMOL/L (ref 98–107)
CHOLEST SERPL-MCNC: 131 MG/DL
CHOLEST/HDLC SERPL: 6 {RATIO} (ref 0–5)
CO2 SERPL-SCNC: 26 MMOL/L (ref 22–29)
COLOR UR: YELLOW
CREAT SERPL-MCNC: 1.08 MG/DL (ref 0.73–1.18)
DEPRECATED CALCIDIOL+CALCIFEROL SERPL-MC: 38.3 NG/ML (ref 30–80)
EOSINOPHIL # BLD AUTO: 0.7 X10(3)/MCL (ref 0–0.9)
EOSINOPHIL NFR BLD AUTO: 15 %
ERYTHROCYTE [DISTWIDTH] IN BLOOD BY AUTOMATED COUNT: 13.2 % (ref 11.5–14.5)
EST. AVERAGE GLUCOSE BLD GHB EST-MCNC: 119.8 MG/DL
GLOBULIN SER-MCNC: 4 GM/DL (ref 2.4–3.5)
GLUCOSE (UA): NEGATIVE
GLUCOSE SERPL-MCNC: 112 MG/DL (ref 74–100)
HBA1C MFR BLD: 5.8 %
HCT VFR BLD AUTO: 35.3 % (ref 40–51)
HCV AB SERPL QL IA: NONREACTIVE
HDLC SERPL-MCNC: 22 MG/DL (ref 35–60)
HGB BLD-MCNC: 11.2 GM/DL (ref 13.5–17.5)
HGB UR QL STRIP: NEGATIVE
HYALINE CASTS #/AREA URNS LPF: ABNORMAL /LPF
IMM GRANULOCYTES # BLD AUTO: 0.01 10*3/UL
IMM GRANULOCYTES NFR BLD AUTO: 0 %
KETONES UR QL STRIP: NEGATIVE
LDLC SERPL CALC-MCNC: 66 MG/DL (ref 50–140)
LEUKOCYTE ESTERASE UR QL STRIP: NEGATIVE
LYMPHOCYTES # BLD AUTO: 0.7 X10(3)/MCL (ref 0.6–4.6)
LYMPHOCYTES # BLD AUTO: ABNORMAL UNIT/L (ref 1260–5520)
LYMPHOCYTES NFR BLD AUTO: 15 %
LYMPHOCYTES NFR LN MANUAL: ABNORMAL % (ref 28–48)
MCH RBC QN AUTO: 28.3 PG (ref 26–34)
MCHC RBC AUTO-ENTMCNC: 31.7 GM/DL (ref 31–37)
MCV RBC AUTO: 89.1 FL (ref 80–100)
MONOCYTES # BLD AUTO: 0.2 X10(3)/MCL (ref 0.1–1.3)
MONOCYTES NFR BLD AUTO: 5 %
NEG CONT SPOT COUNT: NORMAL
NEUTROPHILS # BLD AUTO: 2.79 X10(3)/MCL (ref 2.1–9.2)
NEUTROPHILS NFR BLD AUTO: 63 %
NITRITE UR QL STRIP: NEGATIVE
NRBC BLD AUTO-RTO: 0 % (ref 0–0.2)
PANEL A SPOT COUNT: 0
PANEL B SPOT COUNT: 0
PH UR STRIP: 6 [PH] (ref 4.5–8)
PLATELET # BLD AUTO: 290 X10(3)/MCL (ref 130–400)
PMV BLD AUTO: 10 FL (ref 7.4–10.4)
POS CONT SPOT COUNT: NORMAL
POTASSIUM SERPL-SCNC: 4.3 MMOL/L (ref 3.5–5.1)
PROT SERPL-MCNC: 8 GM/DL (ref 6.4–8.3)
PROT UR QL STRIP: 30 MG/DL
RBC # BLD AUTO: 3.96 X10(6)/MCL (ref 4.5–5.9)
RBC #/AREA URNS AUTO: ABNORMAL /HPF
RPR SER QL: REACTIVE
SODIUM SERPL-SCNC: 138 MMOL/L (ref 136–145)
SP GR UR STRIP: 1.03 (ref 1–1.03)
SQUAMOUS #/AREA URNS LPF: ABNORMAL /LPF
T PALLIDUM AB SER QL: REACTIVE
T-SPOT.TB: NORMAL
TRIGL SERPL-MCNC: 216 MG/DL (ref 34–140)
TSH SERPL-ACNC: 4.65 UIU/ML (ref 0.35–4.94)
UROBILINOGEN UR STRIP-ACNC: NORMAL
VLDLC SERPL CALC-MCNC: 43 MG/DL
WBC # BLD AUTO: ABNORMAL /MM3 (ref 4500–11500)
WBC # SPEC AUTO: 4.4 X10(3)/MCL (ref 4.5–11)
WBC #/AREA URNS AUTO: ABNORMAL /HPF

## 2021-10-11 ENCOUNTER — HISTORICAL (OUTPATIENT)
Dept: ADMINISTRATIVE | Facility: HOSPITAL | Age: 31
End: 2021-10-11

## 2021-10-11 LAB
ABS NEUT (OLG): 2.93 X10(3)/MCL (ref 2.1–9.2)
ALBUMIN SERPL-MCNC: 4.6 GM/DL (ref 3.5–5)
ALBUMIN/GLOB SERPL: 1 RATIO (ref 1.1–2)
ALP SERPL-CCNC: 79 UNIT/L (ref 40–150)
ALT SERPL-CCNC: 27 UNIT/L (ref 0–55)
APPEARANCE, UA: CLEAR
AST SERPL-CCNC: 39 UNIT/L (ref 5–34)
BACTERIA #/AREA URNS AUTO: ABNORMAL /HPF
BASOPHILS # BLD AUTO: 0 X10(3)/MCL (ref 0–0.2)
BASOPHILS NFR BLD AUTO: 1 %
BILIRUB SERPL-MCNC: 0.7 MG/DL
BILIRUB UR QL STRIP: NEGATIVE
BILIRUBIN DIRECT+TOT PNL SERPL-MCNC: 0.3 MG/DL (ref 0–0.5)
BILIRUBIN DIRECT+TOT PNL SERPL-MCNC: 0.4 MG/DL (ref 0–0.8)
BUN SERPL-MCNC: 9.3 MG/DL (ref 8.9–20.6)
CALCIUM SERPL-MCNC: 10.3 MG/DL (ref 8.4–10.2)
CHLORIDE SERPL-SCNC: 105 MMOL/L (ref 98–107)
CO2 SERPL-SCNC: 26 MMOL/L (ref 22–29)
COLOR UR: YELLOW
CREAT SERPL-MCNC: 0.96 MG/DL (ref 0.73–1.18)
EOSINOPHIL # BLD AUTO: 0.9 X10(3)/MCL (ref 0–0.9)
EOSINOPHIL NFR BLD AUTO: 18 %
ERYTHROCYTE [DISTWIDTH] IN BLOOD BY AUTOMATED COUNT: 13.4 % (ref 11.5–14.5)
GLOBULIN SER-MCNC: 4.6 GM/DL (ref 2.4–3.5)
GLUCOSE (UA): NEGATIVE
GLUCOSE SERPL-MCNC: 84 MG/DL (ref 74–100)
HCT VFR BLD AUTO: 33.6 % (ref 40–51)
HGB BLD-MCNC: 10.6 GM/DL (ref 13.5–17.5)
HGB UR QL STRIP: NEGATIVE
HYALINE CASTS #/AREA URNS LPF: ABNORMAL /LPF
IMM GRANULOCYTES # BLD AUTO: 0.02 10*3/UL
IMM GRANULOCYTES NFR BLD AUTO: 0 %
KETONES UR QL STRIP: NEGATIVE
LEUKOCYTE ESTERASE UR QL STRIP: NEGATIVE
LYMPHOCYTES # BLD AUTO: 0.7 X10(3)/MCL (ref 0.6–4.6)
LYMPHOCYTES NFR BLD AUTO: 15 %
MCH RBC QN AUTO: 28.4 PG (ref 26–34)
MCHC RBC AUTO-ENTMCNC: 31.5 GM/DL (ref 31–37)
MCV RBC AUTO: 90.1 FL (ref 80–100)
MONOCYTES # BLD AUTO: 0.2 X10(3)/MCL (ref 0.1–1.3)
MONOCYTES NFR BLD AUTO: 4 %
NEUTROPHILS # BLD AUTO: 2.93 X10(3)/MCL (ref 2.1–9.2)
NEUTROPHILS NFR BLD AUTO: 61 %
NITRITE UR QL STRIP: NEGATIVE
NRBC BLD AUTO-RTO: 0 % (ref 0–0.2)
PH UR STRIP: 6 [PH] (ref 4.5–8)
PLATELET # BLD AUTO: 254 X10(3)/MCL (ref 130–400)
PMV BLD AUTO: 10.3 FL (ref 7.4–10.4)
POTASSIUM SERPL-SCNC: 4 MMOL/L (ref 3.5–5.1)
PROT SERPL-MCNC: 9.2 GM/DL (ref 6.4–8.3)
PROT UR QL STRIP: 30 MG/DL
RBC # BLD AUTO: 3.73 X10(6)/MCL (ref 4.5–5.9)
RBC #/AREA URNS AUTO: ABNORMAL /HPF
SODIUM SERPL-SCNC: 140 MMOL/L (ref 136–145)
SP GR UR STRIP: 1.03 (ref 1–1.03)
SQUAMOUS #/AREA URNS LPF: ABNORMAL /LPF
UROBILINOGEN UR STRIP-ACNC: NORMAL
WBC # SPEC AUTO: 4.8 X10(3)/MCL (ref 4.5–11)
WBC #/AREA URNS AUTO: ABNORMAL /HPF

## 2021-10-13 LAB — FINAL CULTURE: NO GROWTH

## 2021-10-14 LAB — C DIFF INTERP: NORMAL

## 2021-10-15 ENCOUNTER — HISTORICAL (OUTPATIENT)
Dept: ADMINISTRATIVE | Facility: HOSPITAL | Age: 31
End: 2021-10-15

## 2021-10-16 LAB
FINAL CULTURE: NORMAL
FINAL CULTURE: NORMAL

## 2021-10-17 LAB — FINAL CULTURE: NORMAL

## 2021-11-18 ENCOUNTER — HISTORICAL (OUTPATIENT)
Dept: ADMINISTRATIVE | Facility: HOSPITAL | Age: 31
End: 2021-11-18

## 2021-11-18 LAB
ABS NEUT (OLG): 3.35 X10(3)/MCL (ref 2.1–9.2)
ALBUMIN SERPL-MCNC: 3.4 GM/DL (ref 3.5–5)
ALBUMIN/GLOB SERPL: 0.8 RATIO (ref 1.1–2)
ALP SERPL-CCNC: 147 UNIT/L (ref 40–150)
ALT SERPL-CCNC: 92 UNIT/L (ref 0–55)
AST SERPL-CCNC: 29 UNIT/L (ref 5–34)
BASOPHILS # BLD AUTO: 0 X10(3)/MCL (ref 0–0.2)
BASOPHILS NFR BLD AUTO: 1 %
BILIRUB SERPL-MCNC: 0.2 MG/DL
BILIRUBIN DIRECT+TOT PNL SERPL-MCNC: 0 MG/DL (ref 0–0.8)
BILIRUBIN DIRECT+TOT PNL SERPL-MCNC: 0.2 MG/DL (ref 0–0.5)
BUN SERPL-MCNC: 16.9 MG/DL (ref 8.9–20.6)
CALCIUM SERPL-MCNC: 9.3 MG/DL (ref 8.7–10.5)
CD3+CD4+ CELLS # SPEC: 101 UNIT/L (ref 589–1505)
CD3+CD4+ CELLS NFR BLD: 15 % (ref 31–59)
CHLORIDE SERPL-SCNC: 98 MMOL/L (ref 98–107)
CO2 SERPL-SCNC: 26 MMOL/L (ref 22–29)
CREAT SERPL-MCNC: 1.12 MG/DL (ref 0.73–1.18)
DEPRECATED CALCIDIOL+CALCIFEROL SERPL-MC: 22.7 NG/ML (ref 30–80)
EOSINOPHIL # BLD AUTO: 0.1 X10(3)/MCL (ref 0–0.9)
EOSINOPHIL NFR BLD AUTO: 2 %
ERYTHROCYTE [DISTWIDTH] IN BLOOD BY AUTOMATED COUNT: 25.4 % (ref 11.5–14.5)
GLOBULIN SER-MCNC: 4.5 GM/DL (ref 2.4–3.5)
GLUCOSE SERPL-MCNC: 338 MG/DL (ref 74–100)
HCT VFR BLD AUTO: 32.4 % (ref 40–51)
HGB BLD-MCNC: 10.6 GM/DL (ref 13.5–17.5)
IMM GRANULOCYTES # BLD AUTO: 0.15 10*3/UL
IMM GRANULOCYTES NFR BLD AUTO: 3 %
LYMPHOCYTES # BLD AUTO: 0.7 X10(3)/MCL (ref 0.6–4.6)
LYMPHOCYTES # BLD AUTO: 672 UNIT/L (ref 1260–5520)
LYMPHOCYTES NFR BLD AUTO: 14 %
LYMPHOCYTES NFR LN MANUAL: 14 % (ref 28–48)
MCH RBC QN AUTO: 32.4 PG (ref 26–34)
MCHC RBC AUTO-ENTMCNC: 32.7 GM/DL (ref 31–37)
MCV RBC AUTO: 99.1 FL (ref 80–100)
MONOCYTES # BLD AUTO: 0.5 X10(3)/MCL (ref 0.1–1.3)
MONOCYTES NFR BLD AUTO: 11 %
NEUTROPHILS # BLD AUTO: 3.35 X10(3)/MCL (ref 2.1–9.2)
NEUTROPHILS NFR BLD AUTO: 69 %
NRBC BLD AUTO-RTO: 0.6 % (ref 0–0.2)
PLATELET # BLD AUTO: 265 X10(3)/MCL (ref 130–400)
PMV BLD AUTO: 9.3 FL (ref 7.4–10.4)
POTASSIUM SERPL-SCNC: 4.3 MMOL/L (ref 3.5–5.1)
PROT SERPL-MCNC: 7.9 GM/DL (ref 6.4–8.3)
RBC # BLD AUTO: 3.27 X10(6)/MCL (ref 4.5–5.9)
SODIUM SERPL-SCNC: 133 MMOL/L (ref 136–145)
WBC # BLD AUTO: 4800 /MM3 (ref 4500–11500)
WBC # SPEC AUTO: 4.8 X10(3)/MCL (ref 4.5–11)

## 2021-11-19 ENCOUNTER — HISTORICAL (OUTPATIENT)
Dept: ADMINISTRATIVE | Facility: HOSPITAL | Age: 31
End: 2021-11-19

## 2021-11-25 ENCOUNTER — HOSPITAL ENCOUNTER (OUTPATIENT)
Dept: MEDSURG UNIT | Facility: HOSPITAL | Age: 31
End: 2021-11-30
Attending: INTERNAL MEDICINE | Admitting: INTERNAL MEDICINE

## 2021-11-25 LAB
ABS NEUT (OLG): 4.69 X10(3)/MCL (ref 2.1–9.2)
ALBUMIN SERPL-MCNC: 2.8 GM/DL (ref 3.5–5)
ALBUMIN/GLOB SERPL: 0.5 RATIO (ref 1.1–2)
ALP SERPL-CCNC: 102 UNIT/L (ref 40–150)
ALT SERPL-CCNC: 37 UNIT/L (ref 0–55)
ANISOCYTOSIS BLD QL SMEAR: 1
APPEARANCE, UA: ABNORMAL
AST SERPL-CCNC: 36 UNIT/L (ref 5–34)
BACTERIA SPEC CULT: ABNORMAL /HPF
BILIRUB SERPL-MCNC: 0.5 MG/DL
BILIRUB UR QL STRIP: ABNORMAL
BILIRUBIN DIRECT+TOT PNL SERPL-MCNC: 0.1 MG/DL (ref 0–0.8)
BILIRUBIN DIRECT+TOT PNL SERPL-MCNC: 0.4 MG/DL (ref 0–0.5)
BUN SERPL-MCNC: 14.5 MG/DL (ref 8.9–20.6)
CALCIUM SERPL-MCNC: 9.4 MG/DL (ref 8.7–10.5)
CHLORIDE SERPL-SCNC: 99 MMOL/L (ref 98–107)
CO2 SERPL-SCNC: 15 MMOL/L (ref 22–29)
COLOR UR: ABNORMAL
CREAT SERPL-MCNC: 0.92 MG/DL (ref 0.73–1.18)
ERYTHROCYTE [DISTWIDTH] IN BLOOD BY AUTOMATED COUNT: 22.4 % (ref 11.5–17)
GLOBULIN SER-MCNC: 5.4 GM/DL (ref 2.4–3.5)
GLUCOSE (UA): ABNORMAL
GLUCOSE SERPL-MCNC: 123 MG/DL (ref 74–100)
HCT VFR BLD AUTO: 32.6 % (ref 42–52)
HGB BLD-MCNC: 11 GM/DL (ref 14–18)
HGB UR QL STRIP: NEGATIVE
KETONES UR QL STRIP: NEGATIVE
LACTATE SERPL-SCNC: 1 MMOL/L (ref 0.5–2.2)
LEUKOCYTE ESTERASE UR QL STRIP: ABNORMAL
LIPASE SERPL-CCNC: 18 U/L
LYMPHOCYTES NFR BLD MANUAL: 9 % (ref 13–40)
MACROCYTES BLD QL SMEAR: 2 /MCL
MCH RBC QN AUTO: 31.2 PG (ref 27–31)
MCHC RBC AUTO-ENTMCNC: 33.7 GM/DL (ref 33–36)
MCV RBC AUTO: 92.4 FL (ref 80–94)
MONOCYTES NFR BLD MANUAL: 3 % (ref 2–11)
NEUTROPHILS NFR BLD MANUAL: 89 % (ref 47–80)
NITRITE UR QL STRIP: NEGATIVE
PH UR STRIP: 5.5 [PH] (ref 5–9)
PLATELET # BLD AUTO: 230 X10(3)/MCL (ref 130–400)
PLATELET # BLD EST: NORMAL 10*3/UL
PMV BLD AUTO: 9.5 FL (ref 7.4–10.4)
POTASSIUM SERPL-SCNC: 4.1 MMOL/L (ref 3.5–5.1)
PROT SERPL-MCNC: 8.2 GM/DL (ref 6.4–8.3)
PROT UR QL STRIP: ABNORMAL
RBC # BLD AUTO: 3.53 X10(6)/MCL (ref 4.7–6.1)
RBC #/AREA URNS HPF: ABNORMAL /[HPF]
RBC MORPH BLD: ABNORMAL
SARS-COV-2 AG RESP QL IA.RAPID: NEGATIVE
SODIUM SERPL-SCNC: 122 MMOL/L (ref 136–145)
SP GR UR STRIP: 1.03 (ref 1–1.03)
SQUAMOUS EPITHELIAL, UA: ABNORMAL /HPF (ref 0–4)
UROBILINOGEN UR STRIP-ACNC: 1
WBC # SPEC AUTO: 6 X10(3)/MCL (ref 4.5–11.5)
WBC #/AREA URNS HPF: ABNORMAL /HPF

## 2021-11-26 LAB
ABS NEUT (OLG): 5.07 X10(3)/MCL (ref 2.1–9.2)
ALBUMIN SERPL-MCNC: 2.4 GM/DL (ref 3.5–5)
ALBUMIN/GLOB SERPL: 0.6 RATIO (ref 1.1–2)
ALP SERPL-CCNC: 111 UNIT/L (ref 40–150)
ALT SERPL-CCNC: 30 UNIT/L (ref 0–55)
ANISOCYTOSIS BLD QL SMEAR: 2
AST SERPL-CCNC: 27 UNIT/L (ref 5–34)
BILIRUB SERPL-MCNC: 0.6 MG/DL
BILIRUBIN DIRECT+TOT PNL SERPL-MCNC: 0.2 MG/DL (ref 0–0.8)
BILIRUBIN DIRECT+TOT PNL SERPL-MCNC: 0.4 MG/DL (ref 0–0.5)
BUN SERPL-MCNC: 7.6 MG/DL (ref 8.9–20.6)
BURR CELLS BLD QL SMEAR: 1 (ref 0–0)
CALCIUM SERPL-MCNC: 8 MG/DL (ref 8.7–10.5)
CHLORIDE SERPL-SCNC: 103 MMOL/L (ref 98–107)
CO2 SERPL-SCNC: 17 MMOL/L (ref 22–29)
CREAT SERPL-MCNC: 0.78 MG/DL (ref 0.73–1.18)
ERYTHROCYTE [DISTWIDTH] IN BLOOD BY AUTOMATED COUNT: 22.6 % (ref 11.5–17)
GLOBULIN SER-MCNC: 3.7 GM/DL (ref 2.4–3.5)
GLUCOSE SERPL-MCNC: 107 MG/DL (ref 74–100)
HCT VFR BLD AUTO: 27.8 % (ref 42–52)
HGB BLD-MCNC: 8.9 GM/DL (ref 14–18)
LYMPHOCYTES NFR BLD MANUAL: 8 % (ref 13–40)
MACROCYTES BLD QL SMEAR: 1 /MCL
MCH RBC QN AUTO: 31.2 PG (ref 27–31)
MCHC RBC AUTO-ENTMCNC: 32 GM/DL (ref 33–36)
MCV RBC AUTO: 97.5 FL (ref 80–94)
METAMYELOCYTES NFR BLD MANUAL: 1 %
MONOCYTES NFR BLD MANUAL: 8 % (ref 2–11)
NEUTROPHILS NFR BLD MANUAL: 83 % (ref 47–80)
PLATELET # BLD AUTO: 246 X10(3)/MCL (ref 130–400)
PLATELET # BLD EST: NORMAL 10*3/UL
PMV BLD AUTO: 9.6 FL (ref 7.4–10.4)
POIKILOCYTOSIS BLD QL SMEAR: 1
POTASSIUM SERPL-SCNC: 4.3 MMOL/L (ref 3.5–5.1)
PROT SERPL-MCNC: 6.1 GM/DL (ref 6.4–8.3)
RBC # BLD AUTO: 2.85 X10(6)/MCL (ref 4.7–6.1)
SODIUM SERPL-SCNC: 130 MMOL/L (ref 136–145)
WBC # SPEC AUTO: 6.1 X10(3)/MCL (ref 4.5–11.5)

## 2021-11-27 LAB
ABS NEUT (OLG): 4.05 X10(3)/MCL (ref 2.1–9.2)
ALBUMIN SERPL-MCNC: 2.1 GM/DL (ref 3.5–5)
ALBUMIN/GLOB SERPL: 0.4 RATIO (ref 1.1–2)
ALP SERPL-CCNC: 120 UNIT/L (ref 40–150)
ALT SERPL-CCNC: 28 UNIT/L (ref 0–55)
ANISOCYTOSIS BLD QL SMEAR: 1
AST SERPL-CCNC: 32 UNIT/L (ref 5–34)
BASOPHILS NFR BLD MANUAL: 2 % (ref 0–2)
BILIRUB SERPL-MCNC: 0.4 MG/DL
BILIRUBIN DIRECT+TOT PNL SERPL-MCNC: 0.1 MG/DL (ref 0–0.8)
BILIRUBIN DIRECT+TOT PNL SERPL-MCNC: 0.3 MG/DL (ref 0–0.5)
BUN SERPL-MCNC: 6.5 MG/DL (ref 8.9–20.6)
BURR CELLS BLD QL SMEAR: 1 (ref 0–0)
CALCIUM SERPL-MCNC: 8.8 MG/DL (ref 8.7–10.5)
CHLORIDE SERPL-SCNC: 104 MMOL/L (ref 98–107)
CO2 SERPL-SCNC: 16 MMOL/L (ref 22–29)
CREAT SERPL-MCNC: 0.74 MG/DL (ref 0.73–1.18)
DEPRECATED CALCIDIOL+CALCIFEROL SERPL-MC: 12.9 NG/ML (ref 30–80)
ERYTHROCYTE [DISTWIDTH] IN BLOOD BY AUTOMATED COUNT: 22.4 % (ref 11.5–17)
GLOBULIN SER-MCNC: 4.7 GM/DL (ref 2.4–3.5)
GLUCOSE SERPL-MCNC: 90 MG/DL (ref 74–100)
HCT VFR BLD AUTO: 27.6 % (ref 42–52)
HGB BLD-MCNC: 8.9 GM/DL (ref 14–18)
LYMPHOCYTES NFR BLD MANUAL: 5 % (ref 13–40)
MACROCYTES BLD QL SMEAR: 1 /MCL
MAGNESIUM SERPL-MCNC: 1.7 MG/DL (ref 1.6–2.6)
MCH RBC QN AUTO: 31.2 PG (ref 27–31)
MCHC RBC AUTO-ENTMCNC: 32.2 GM/DL (ref 33–36)
MCV RBC AUTO: 96.8 FL (ref 80–94)
MONOCYTES NFR BLD MANUAL: 7 % (ref 2–11)
NEUTROPHILS NFR BLD MANUAL: 86 % (ref 47–80)
PLATELET # BLD AUTO: 287 X10(3)/MCL (ref 130–400)
PLATELET # BLD EST: NORMAL 10*3/UL
PMV BLD AUTO: 9.2 FL (ref 7.4–10.4)
POIKILOCYTOSIS BLD QL SMEAR: 1
POTASSIUM SERPL-SCNC: 4 MMOL/L (ref 3.5–5.1)
PROT SERPL-MCNC: 6.8 GM/DL (ref 6.4–8.3)
RBC # BLD AUTO: 2.85 X10(6)/MCL (ref 4.7–6.1)
RBC MORPH BLD: ABNORMAL
SODIUM SERPL-SCNC: 132 MMOL/L (ref 136–145)
WBC # SPEC AUTO: 5.3 X10(3)/MCL (ref 4.5–11.5)

## 2021-11-28 LAB
ABS NEUT (OLG): 3.26 X10(3)/MCL (ref 2.1–9.2)
ABS NEUT (OLG): 3.75 X10(3)/MCL (ref 2.1–9.2)
ANISOCYTOSIS BLD QL SMEAR: 1
ANISOCYTOSIS BLD QL SMEAR: 2
BASOPHILS NFR BLD MANUAL: 1 % (ref 0–2)
BUN SERPL-MCNC: 4 MG/DL (ref 8.9–20.6)
CALCIUM SERPL-MCNC: 8.3 MG/DL (ref 8.7–10.5)
CHLORIDE SERPL-SCNC: 108 MMOL/L (ref 98–107)
CO2 SERPL-SCNC: 16 MMOL/L (ref 22–29)
CREAT SERPL-MCNC: 0.64 MG/DL (ref 0.73–1.18)
CREAT/UREA NIT SERPL: 6
CROSSMATCH INTERPRETATION: NORMAL
EOSINOPHIL NFR BLD MANUAL: 3 % (ref 0–8)
EOSINOPHIL NFR BLD MANUAL: 3 % (ref 0–8)
ERYTHROCYTE [DISTWIDTH] IN BLOOD BY AUTOMATED COUNT: 21.8 % (ref 11.5–17)
ERYTHROCYTE [DISTWIDTH] IN BLOOD BY AUTOMATED COUNT: 21.8 % (ref 11.5–17)
GLUCOSE SERPL-MCNC: 118 MG/DL (ref 74–100)
GROUP & RH: NORMAL
HCT VFR BLD AUTO: 22.4 % (ref 42–52)
HCT VFR BLD AUTO: 23.2 % (ref 42–52)
HGB BLD-MCNC: 7.6 GM/DL (ref 14–18)
HGB BLD-MCNC: 7.8 GM/DL (ref 14–18)
LYMPHOCYTES NFR BLD MANUAL: 4 % (ref 13–40)
LYMPHOCYTES NFR BLD MANUAL: 5 % (ref 13–40)
MAGNESIUM SERPL-MCNC: 1.7 MG/DL (ref 1.6–2.6)
MCH RBC QN AUTO: 31 PG (ref 27–31)
MCH RBC QN AUTO: 31.4 PG (ref 27–31)
MCHC RBC AUTO-ENTMCNC: 33.6 GM/DL (ref 33–36)
MCHC RBC AUTO-ENTMCNC: 33.9 GM/DL (ref 33–36)
MCV RBC AUTO: 92.1 FL (ref 80–94)
MCV RBC AUTO: 92.6 FL (ref 80–94)
METAMYELOCYTES NFR BLD MANUAL: 1 %
METAMYELOCYTES NFR BLD MANUAL: 1 %
MONOCYTES NFR BLD MANUAL: 4 % (ref 2–11)
MONOCYTES NFR BLD MANUAL: 9 % (ref 2–11)
NEUTROPHILS NFR BLD MANUAL: 82 % (ref 47–80)
NEUTROPHILS NFR BLD MANUAL: 88 % (ref 47–80)
PLATELET # BLD AUTO: 313 X10(3)/MCL (ref 130–400)
PLATELET # BLD AUTO: 354 X10(3)/MCL (ref 130–400)
PLATELET # BLD EST: NORMAL 10*3/UL
PLATELET # BLD EST: NORMAL 10*3/UL
PMV BLD AUTO: 9 FL (ref 7.4–10.4)
PMV BLD AUTO: 9 FL (ref 7.4–10.4)
POTASSIUM SERPL-SCNC: 3.5 MMOL/L (ref 3.5–5.1)
PRODUCT READY: NORMAL
RBC # BLD AUTO: 2.42 X10(6)/MCL (ref 4.7–6.1)
RBC # BLD AUTO: 2.52 X10(6)/MCL (ref 4.7–6.1)
RBC MORPH BLD: ABNORMAL
RBC MORPH BLD: ABNORMAL
SODIUM SERPL-SCNC: 135 MMOL/L (ref 136–145)
TRANSFUSION ORDER: NORMAL
WBC # SPEC AUTO: 4.3 X10(3)/MCL (ref 4.5–11.5)
WBC # SPEC AUTO: 4.9 X10(3)/MCL (ref 4.5–11.5)

## 2021-11-29 LAB
ABS NEUT (OLG): 3.52 X10(3)/MCL (ref 2.1–9.2)
ALBUMIN SERPL-MCNC: 2.3 GM/DL (ref 3.5–5)
ALBUMIN/GLOB SERPL: 0.5 RATIO (ref 1.1–2)
ALP SERPL-CCNC: 125 UNIT/L (ref 40–150)
ALT SERPL-CCNC: 28 UNIT/L (ref 0–55)
AST SERPL-CCNC: 27 UNIT/L (ref 5–34)
BASOPHILS # BLD AUTO: 0 X10(3)/MCL (ref 0–0.2)
BASOPHILS NFR BLD AUTO: 1 %
BILIRUB SERPL-MCNC: 0.8 MG/DL
BILIRUBIN DIRECT+TOT PNL SERPL-MCNC: 0.4 MG/DL (ref 0–0.5)
BILIRUBIN DIRECT+TOT PNL SERPL-MCNC: 0.4 MG/DL (ref 0–0.8)
BUN SERPL-MCNC: 4.7 MG/DL (ref 8.9–20.6)
CALCIUM SERPL-MCNC: 8.6 MG/DL (ref 8.7–10.5)
CHLORIDE SERPL-SCNC: 105 MMOL/L (ref 98–107)
CO2 SERPL-SCNC: 21 MMOL/L (ref 22–29)
COLOR STL: NORMAL
CONSISTENCY STL: NORMAL
CREAT SERPL-MCNC: 0.74 MG/DL (ref 0.73–1.18)
EOSINOPHIL # BLD AUTO: 0.2 X10(3)/MCL (ref 0–0.9)
EOSINOPHIL NFR BLD AUTO: 3 %
ERYTHROCYTE [DISTWIDTH] IN BLOOD BY AUTOMATED COUNT: 22.3 % (ref 11.5–17)
GLOBULIN SER-MCNC: 4.2 GM/DL (ref 2.4–3.5)
GLUCOSE SERPL-MCNC: 92 MG/DL (ref 74–100)
HCT VFR BLD AUTO: 31.7 % (ref 42–52)
HEMOCCULT SP1 STL QL: NEGATIVE
HGB BLD-MCNC: 10.2 GM/DL (ref 14–18)
LYMPHOCYTES # BLD AUTO: 0.5 X10(3)/MCL (ref 0.6–4.6)
LYMPHOCYTES NFR BLD AUTO: 11 %
MAGNESIUM SERPL-MCNC: 1.8 MG/DL (ref 1.6–2.6)
MCH RBC QN AUTO: 30.6 PG (ref 27–31)
MCHC RBC AUTO-ENTMCNC: 32.2 GM/DL (ref 33–36)
MCV RBC AUTO: 95.2 FL (ref 80–94)
MONOCYTES # BLD AUTO: 0.5 X10(3)/MCL (ref 0.1–1.3)
MONOCYTES NFR BLD AUTO: 11 %
NEUTROPHILS # BLD AUTO: 3.52 X10(3)/MCL (ref 2.1–9.2)
NEUTROPHILS NFR BLD AUTO: 74 %
PLATELET # BLD AUTO: 396 X10(3)/MCL (ref 130–400)
PMV BLD AUTO: 9.1 FL (ref 9.4–12.4)
POTASSIUM SERPL-SCNC: 4.3 MMOL/L (ref 3.5–5.1)
PROT SERPL-MCNC: 6.5 GM/DL (ref 6.4–8.3)
RBC # BLD AUTO: 3.33 X10(6)/MCL (ref 4.7–6.1)
SODIUM SERPL-SCNC: 135 MMOL/L (ref 136–145)
WBC # SPEC AUTO: 4.8 X10(3)/MCL (ref 4.5–11.5)

## 2021-11-30 LAB
ABS NEUT (OLG): 2.11 X10(3)/MCL (ref 2.1–9.2)
ALBUMIN SERPL-MCNC: 2.2 GM/DL (ref 3.5–5)
ALBUMIN/GLOB SERPL: 0.5 RATIO (ref 1.1–2)
ALP SERPL-CCNC: 132 UNIT/L (ref 40–150)
ALT SERPL-CCNC: 28 UNIT/L (ref 0–55)
ANISOCYTOSIS BLD QL SMEAR: 1
AST SERPL-CCNC: 24 UNIT/L (ref 5–34)
BILIRUB SERPL-MCNC: 0.4 MG/DL
BILIRUBIN DIRECT+TOT PNL SERPL-MCNC: 0.1 MG/DL (ref 0–0.8)
BILIRUBIN DIRECT+TOT PNL SERPL-MCNC: 0.3 MG/DL (ref 0–0.5)
BUN SERPL-MCNC: 6.7 MG/DL (ref 8.9–20.6)
CALCIUM SERPL-MCNC: 8.3 MG/DL (ref 8.7–10.5)
CHLORIDE SERPL-SCNC: 106 MMOL/L (ref 98–107)
CO2 SERPL-SCNC: 20 MMOL/L (ref 22–29)
CREAT SERPL-MCNC: 0.65 MG/DL (ref 0.73–1.18)
EOSINOPHIL NFR BLD MANUAL: 6 % (ref 0–8)
ERYTHROCYTE [DISTWIDTH] IN BLOOD BY AUTOMATED COUNT: 21.9 % (ref 11.5–17)
FOLATE SERPL-MCNC: 10.4 NG/ML (ref 7–31.4)
GLOBULIN SER-MCNC: 4.1 GM/DL (ref 2.4–3.5)
GLUCOSE SERPL-MCNC: 87 MG/DL (ref 74–100)
HAPTOGLOB SERPL-MCNC: 382 MG/DL (ref 14–258)
HCT VFR BLD AUTO: 26.8 % (ref 42–52)
HGB BLD-MCNC: 9.3 GM/DL (ref 14–18)
HYPOCHROMIA BLD QL SMEAR: 1
LDH SERPL-CCNC: 414 UNIT/L (ref 140–271)
LYMPHOCYTES NFR BLD MANUAL: 6 % (ref 13–40)
MAGNESIUM SERPL-MCNC: 1.9 MG/DL (ref 1.6–2.6)
MCH RBC QN AUTO: 31 PG (ref 27–31)
MCHC RBC AUTO-ENTMCNC: 34.7 GM/DL (ref 33–36)
MCV RBC AUTO: 89.3 FL (ref 80–94)
MICROCYTES BLD QL SMEAR: 1
MONOCYTES NFR BLD MANUAL: 8 % (ref 2–11)
MYELOCYTES NFR BLD MANUAL: 2 %
NEUTROPHILS NFR BLD MANUAL: 78 % (ref 47–80)
PLATELET # BLD AUTO: 404 X10(3)/MCL (ref 130–400)
PLATELET # BLD EST: ABNORMAL 10*3/UL
PMV BLD AUTO: 9.1 FL (ref 7.4–10.4)
POIKILOCYTOSIS BLD QL SMEAR: 1
POTASSIUM SERPL-SCNC: 4.3 MMOL/L (ref 3.5–5.1)
PROT SERPL-MCNC: 6.3 GM/DL (ref 6.4–8.3)
RBC # BLD AUTO: 3 X10(6)/MCL (ref 4.7–6.1)
RBC MORPH BLD: ABNORMAL
RET# (OHS): 0.02 X10^6/ML (ref 0.03–0.1)
RETICULOCYTE COUNT AUTOMATED (OLG): 0.7 % (ref 1.1–2.1)
SODIUM SERPL-SCNC: 134 MMOL/L (ref 136–145)
TARGETS BLD QL SMEAR: 1
VIT B12 SERPL-MCNC: 681 PG/ML (ref 213–816)
WBC # SPEC AUTO: 3.2 X10(3)/MCL (ref 4.5–11.5)

## 2021-12-03 LAB
FINAL CULTURE: NORMAL
FINAL CULTURE: NORMAL

## 2021-12-17 ENCOUNTER — HISTORICAL (OUTPATIENT)
Dept: ADMINISTRATIVE | Facility: HOSPITAL | Age: 31
End: 2021-12-17

## 2021-12-17 LAB
ABS NEUT (OLG): 2.75 X10(3)/MCL (ref 2.1–9.2)
ALBUMIN SERPL-MCNC: 3.4 GM/DL (ref 3.5–5)
ALBUMIN/GLOB SERPL: 0.8 RATIO (ref 1.1–2)
ALP SERPL-CCNC: 182 UNIT/L (ref 40–150)
ALT SERPL-CCNC: 57 UNIT/L (ref 0–55)
AST SERPL-CCNC: 40 UNIT/L (ref 5–34)
BASOPHILS # BLD AUTO: 0 X10(3)/MCL (ref 0–0.2)
BASOPHILS NFR BLD AUTO: 1 %
BILIRUB SERPL-MCNC: 0.3 MG/DL
BILIRUBIN DIRECT+TOT PNL SERPL-MCNC: 0.1 MG/DL (ref 0–0.8)
BILIRUBIN DIRECT+TOT PNL SERPL-MCNC: 0.2 MG/DL (ref 0–0.5)
BUN SERPL-MCNC: 9 MG/DL (ref 8.9–20.6)
CALCIUM SERPL-MCNC: 9.6 MG/DL (ref 8.7–10.5)
CD3+CD4+ CELLS # SPEC: 82 UNIT/L (ref 589–1505)
CD3+CD4+ CELLS NFR BLD: 15.8 % (ref 31–59)
CHLORIDE SERPL-SCNC: 102 MMOL/L (ref 98–107)
CO2 SERPL-SCNC: 24 MMOL/L (ref 22–29)
CREAT SERPL-MCNC: 0.92 MG/DL (ref 0.73–1.18)
EOSINOPHIL # BLD AUTO: 0 X10(3)/MCL (ref 0–0.9)
EOSINOPHIL NFR BLD AUTO: 0 %
ERYTHROCYTE [DISTWIDTH] IN BLOOD BY AUTOMATED COUNT: 18.8 % (ref 11.5–14.5)
GLOBULIN SER-MCNC: 4.1 GM/DL (ref 2.4–3.5)
GLUCOSE SERPL-MCNC: 107 MG/DL (ref 74–100)
HCT VFR BLD AUTO: 37.9 % (ref 40–51)
HGB BLD-MCNC: 12.2 GM/DL (ref 13.5–17.5)
IMM GRANULOCYTES # BLD AUTO: 0.06 10*3/UL
IMM GRANULOCYTES NFR BLD AUTO: 2 %
LYMPHOCYTES # BLD AUTO: 0.5 X10(3)/MCL (ref 0.6–4.6)
LYMPHOCYTES # BLD AUTO: 518 UNIT/L (ref 1260–5520)
LYMPHOCYTES NFR BLD AUTO: 14 %
LYMPHOCYTES NFR LN MANUAL: 14 % (ref 28–48)
LYMPHOMA - T-CELL MARKERS SPEC-IMP: ABNORMAL
MCH RBC QN AUTO: 30.4 PG (ref 26–34)
MCHC RBC AUTO-ENTMCNC: 32.2 GM/DL (ref 31–37)
MCV RBC AUTO: 94.5 FL (ref 80–100)
MONOCYTES # BLD AUTO: 0.3 X10(3)/MCL (ref 0.1–1.3)
MONOCYTES NFR BLD AUTO: 8 %
NEUTROPHILS # BLD AUTO: 2.75 X10(3)/MCL (ref 2.1–9.2)
NEUTROPHILS NFR BLD AUTO: 75 %
NRBC BLD AUTO-RTO: 0 % (ref 0–0.2)
PLATELET # BLD AUTO: 291 X10(3)/MCL (ref 130–400)
PMV BLD AUTO: 8.9 FL (ref 7.4–10.4)
POTASSIUM SERPL-SCNC: 4.1 MMOL/L (ref 3.5–5.1)
PROT SERPL-MCNC: 7.5 GM/DL (ref 6.4–8.3)
RBC # BLD AUTO: 4.01 X10(6)/MCL (ref 4.5–5.9)
RPR SER QL: REACTIVE
SODIUM SERPL-SCNC: 134 MMOL/L (ref 136–145)
T PALLIDUM AB SER QL: REACTIVE
WBC # BLD AUTO: 3700 /MM3 (ref 4500–11500)
WBC # SPEC AUTO: 3.7 X10(3)/MCL (ref 4.5–11)

## 2022-01-18 ENCOUNTER — HISTORICAL (OUTPATIENT)
Dept: ADMINISTRATIVE | Facility: HOSPITAL | Age: 32
End: 2022-01-18

## 2022-01-18 LAB
ABS NEUT (OLG): 1.9 X10(3)/MCL (ref 2.1–9.2)
ALBUMIN SERPL-MCNC: 3.3 GM/DL (ref 3.5–5)
ALBUMIN/GLOB SERPL: 0.7 RATIO (ref 1.1–2)
ALP SERPL-CCNC: 171 UNIT/L (ref 40–150)
ALT SERPL-CCNC: 50 UNIT/L (ref 0–55)
AST SERPL-CCNC: 39 UNIT/L (ref 5–34)
BASOPHILS # BLD AUTO: 0 X10(3)/MCL (ref 0–0.2)
BASOPHILS NFR BLD AUTO: 1 %
BILIRUB SERPL-MCNC: 0.4 MG/DL
BILIRUBIN DIRECT+TOT PNL SERPL-MCNC: 0.2 MG/DL (ref 0–0.5)
BILIRUBIN DIRECT+TOT PNL SERPL-MCNC: 0.2 MG/DL (ref 0–0.8)
BUN SERPL-MCNC: 6 MG/DL (ref 8.9–20.6)
CALCIUM SERPL-MCNC: 9.3 MG/DL (ref 8.7–10.5)
CD3+CD4+ CELLS # SPEC: 96 UNIT/L (ref 589–1505)
CD3+CD4+ CELLS NFR BLD: 16.6 % (ref 31–59)
CHLORIDE SERPL-SCNC: 107 MMOL/L (ref 98–107)
CO2 SERPL-SCNC: 25 MMOL/L (ref 22–29)
CREAT SERPL-MCNC: 1.04 MG/DL (ref 0.73–1.18)
EOSINOPHIL # BLD AUTO: 0.4 X10(3)/MCL (ref 0–0.9)
EOSINOPHIL NFR BLD AUTO: 12 %
ERYTHROCYTE [DISTWIDTH] IN BLOOD BY AUTOMATED COUNT: 15.6 % (ref 11.5–14.5)
EST CREAT CLEARANCE SER (OHS): 92.49 ML/MIN
FERRITIN SERPL-MCNC: 911.56 NG/ML (ref 21.81–274.66)
GLOBULIN SER-MCNC: 4.7 GM/DL (ref 2.4–3.5)
GLUCOSE SERPL-MCNC: 90 MG/DL (ref 74–100)
HCT VFR BLD AUTO: 37.3 % (ref 40–51)
HGB BLD-MCNC: 12.2 GM/DL (ref 13.5–17.5)
IMM GRANULOCYTES # BLD AUTO: 0.03 10*3/UL
IMM GRANULOCYTES NFR BLD AUTO: 1 %
IRON SATN MFR SERPL: 44 % (ref 20–50)
IRON SERPL-MCNC: 108 UG/DL (ref 65–175)
LYMPHOCYTES # BLD AUTO: 0.6 X10(3)/MCL (ref 0.6–4.6)
LYMPHOCYTES # BLD AUTO: 576 UNIT/L (ref 1260–5520)
LYMPHOCYTES NFR BLD AUTO: 18 %
LYMPHOCYTES NFR LN MANUAL: 18 % (ref 28–48)
LYMPHOMA - T-CELL MARKERS SPEC-IMP: ABNORMAL
MCH RBC QN AUTO: 30.5 PG (ref 26–34)
MCHC RBC AUTO-ENTMCNC: 32.7 GM/DL (ref 31–37)
MCV RBC AUTO: 93.3 FL (ref 80–100)
MONOCYTES # BLD AUTO: 0.3 X10(3)/MCL (ref 0.1–1.3)
MONOCYTES NFR BLD AUTO: 9 %
NEUTROPHILS # BLD AUTO: 1.9 X10(3)/MCL (ref 2.1–9.2)
NEUTROPHILS NFR BLD AUTO: 59 %
NRBC BLD AUTO-RTO: 0 % (ref 0–0.2)
PLATELET # BLD AUTO: 483 X10(3)/MCL (ref 130–400)
PMV BLD AUTO: 8.4 FL (ref 7.4–10.4)
POTASSIUM SERPL-SCNC: 4.3 MMOL/L (ref 3.5–5.1)
PROT SERPL-MCNC: 8 GM/DL (ref 6.4–8.3)
RBC # BLD AUTO: 4 X10(6)/MCL (ref 4.5–5.9)
RET# (OHS): 0.09 X10(6)/MCL (ref 0.02–0.09)
RETICULOCYTE COUNT AUTOMATED (OLG): 2.2 % (ref 0.5–1.5)
SODIUM SERPL-SCNC: 137 MMOL/L (ref 136–145)
TIBC SERPL-MCNC: 135 UG/DL (ref 69–240)
TIBC SERPL-MCNC: 243 UG/DL (ref 250–450)
TRANSFERRIN SERPL-MCNC: 214 MG/DL (ref 174–364)
WBC # BLD AUTO: 3200 /MM3 (ref 4500–11500)
WBC # SPEC AUTO: 3.2 X10(3)/MCL (ref 4.5–11)

## 2022-01-19 ENCOUNTER — HISTORICAL (OUTPATIENT)
Dept: RADIOLOGY | Facility: HOSPITAL | Age: 32
End: 2022-01-19

## 2022-04-09 ENCOUNTER — HISTORICAL (OUTPATIENT)
Dept: ADMINISTRATIVE | Facility: HOSPITAL | Age: 32
End: 2022-04-09
Payer: MEDICAID

## 2022-04-25 ENCOUNTER — HISTORICAL (OUTPATIENT)
Dept: HEMATOLOGY/ONCOLOGY | Facility: CLINIC | Age: 32
End: 2022-04-25
Payer: MEDICAID

## 2022-04-25 LAB
ABS NEUT (OLG): 0.6 (ref 2.1–9.2)
ALBUMIN SERPL-MCNC: 4 G/DL (ref 3.5–5)
ALBUMIN/GLOB SERPL: 1.2 {RATIO} (ref 1.1–2)
ALP SERPL-CCNC: 120 U/L (ref 40–150)
ALT SERPL-CCNC: 22 U/L (ref 0–55)
AST SERPL-CCNC: 25 U/L (ref 5–34)
BASOPHILS # BLD AUTO: 0 10*3/UL (ref 0–0.2)
BASOPHILS NFR BLD AUTO: 0.6 %
BILIRUB SERPL-MCNC: 0.3 MG/DL
BILIRUBIN DIRECT+TOT PNL SERPL-MCNC: 0.1 (ref 0–0.8)
BILIRUBIN DIRECT+TOT PNL SERPL-MCNC: 0.2 (ref 0–0.5)
BUN SERPL-MCNC: 12 MG/DL (ref 8.9–20.6)
CALCIUM SERPL-MCNC: 9.3 MG/DL (ref 8.7–10.5)
CHLORIDE SERPL-SCNC: 108 MMOL/L (ref 98–107)
CO2 SERPL-SCNC: 21 MMOL/L (ref 22–29)
CREAT SERPL-MCNC: 1.38 MG/DL (ref 0.73–1.18)
EOSINOPHIL # BLD AUTO: 0.6 10*3/UL (ref 0–0.9)
EOSINOPHIL NFR BLD AUTO: 19.5 %
ERYTHROCYTE [DISTWIDTH] IN BLOOD BY AUTOMATED COUNT: 13 % (ref 11.5–17)
GLOBULIN SER-MCNC: 3.3 G/DL (ref 2.4–3.5)
GLUCOSE SERPL-MCNC: 94 MG/DL (ref 74–100)
HCT VFR BLD AUTO: 38.6 % (ref 42–52)
HEMOLYSIS INTERF INDEX SERPL-ACNC: 11
HGB BLD-MCNC: 13 G/DL (ref 14–18)
ICTERIC INTERF INDEX SERPL-ACNC: 1
LIPEMIC INTERF INDEX SERPL-ACNC: 32
LYMPHOCYTES # BLD AUTO: 1.7 10*3/UL (ref 0.6–4.6)
LYMPHOCYTES NFR BLD AUTO: 51.7 %
MANUAL DIFF? (OHS): NO
MCH RBC QN AUTO: 30 PG (ref 27–31)
MCHC RBC AUTO-ENTMCNC: 33.7 G/DL (ref 33–36)
MCV RBC AUTO: 89.1 FL (ref 80–94)
MONOCYTES # BLD AUTO: 0.3 10*3/UL (ref 0.1–1.3)
MONOCYTES NFR BLD AUTO: 9.3 %
NEUTROPHILS # BLD AUTO: 0.6 10*3/UL (ref 2.1–9.2)
NEUTROPHILS NFR BLD AUTO: 18.6 %
PLATELET # BLD AUTO: 246 10*3/UL (ref 130–400)
PMV BLD AUTO: 8.3 FL (ref 9.4–12.4)
POTASSIUM SERPL-SCNC: 4.6 MMOL/L (ref 3.5–5.1)
PROT SERPL-MCNC: 7.3 G/DL (ref 6.4–8.3)
RBC # BLD AUTO: 4.33 10*6/UL (ref 4.7–6.1)
SODIUM SERPL-SCNC: 136 MMOL/L (ref 136–145)
WBC # SPEC AUTO: 3.2 10*3/UL (ref 4.5–11.5)

## 2022-04-27 VITALS
SYSTOLIC BLOOD PRESSURE: 126 MMHG | HEIGHT: 66 IN | DIASTOLIC BLOOD PRESSURE: 77 MMHG | BODY MASS INDEX: 21.24 KG/M2 | OXYGEN SATURATION: 99 % | WEIGHT: 132.19 LBS

## 2022-04-28 DIAGNOSIS — B20 HUMAN IMMUNODEFICIENCY VIRUS (HIV) DISEASE: Primary | ICD-10-CM

## 2022-04-30 NOTE — CONSULTS
Patient:   Azael Hodge             MRN: [id number]            FIN: [id number]               Age:   31 years     Sex:  Male     :  1990   Associated Diagnoses:   None   Author:   Cheli Cage MD      Consultation Information   Hematology Oncology Consult    Date of consult: 2021  Consulting Physician:          History of Present Illness   Clinical history:  30 y/o male with multiple medical problems including HIV/AIDS d/c from the hospital 2021 and returned to the ER on 2021 with abd pain and lower back pain, also reports nausea and constipation, denies any vomiting. Pt is a poor historian and has difficulty describing what is wrong with him. Pt recently admitted for pneumonia. Pt denies any chest pain or SOB. He also presented  with fever.    CT angiogram 2021 done when patient presented with SOB on previous hospitalization showed enlarged mediastinal lymph nodes extending into the bilateral vj. On the right largest conglomeration of lymph nodes measures approximately 1.8 x 3.3 cm. This concepcion prominence is seen throughout the mediastinum as well as enlarged lymph node in the left neck measuring 1.3 cm. No pulmonary embolism.    Upon evaluation at the ER, CT A/P 2021: There are multiple enlarged discrete lymph nodes at the juan antonio hepatis, celiac region, root of the mesentery, retrocrural space and retroperitoneum, the largest measures approximately 3.7 cm in the left paraaortic region. Consider possible lymphoma. There is trace pericholecystic fluid. No radiodense gallstone or wall edema is seen. Considerations include early acute cholecystitis and reaction to adjacent hepatitis. Ultrasound may be helpful for more definitive characterization if needed. Mild hepatomegaly. There is some concentric mural thickening of the anorectum which may represent proctitis.     Of note CT C/A/P 10/19/2021 with Findings consistent with bilateral atypical pneumonia. Lymph  nodes: There are scattered reactive-sized mediastinal lymph nodes. No pathologic concepcion enlargement or necrotic adenopathy. Otherwise, no evidence of acute or suspicious focal abnormality within the chest, abdomen, or pelvis. Incidentally noted gallbladder sludge without calcified stone or biliary obstruction.    Blood work with normal WBCs, he was hyponatremic with a sodium of 122, acidotic with a CO2 of 15, lactic acid 1 and lipase 18.  He was tachycardic but normotensive.  UA with trace of leukocyte esterase, 2+ protein, 1+ glucose and 1+ bilirubin.  Covid rapid test was negative.  CD4 on 11/18/2021 was 101.  Patient has been followed by infectious disease services.    We were consulted to evaluate for possible lymphoma.  Patient is seen in rounds and he is laying in bed.  He is by himself.  He looks chronically ill.  Abdomen is distended but he reports that pain is much improved. Patient had 1 unit of blood transfusion on 11/28/2021 for a hemoglobin of 7.6.  Today hemoglobin 9.3.  Hyponatremia improved.  Today sodium 134, CO2 20.     He has been afebrile for 24 Hours now.      Review of Systems   All 12 point review of system was taking and as per history of present illness      Health Status   Allergies:    Allergic Reactions (Selected)  High  Banana- Pharyngeal edema.  Severity Not Documented  Benzodiazepines- Hallucinations.,    Allergies (2) Active Reaction  Banana Pharyngeal edema  benzodiazepines Hallucinations     Current medications:  (Selected)   Inpatient Medications  Ordered  Bactrim  mg-160 mg oral tablet: 1 tab(s), form: Tab, Other - see special instructions, Oral, Daily, first dose 11/25/21 13:11:00 CST  Carafate 1 g oral tablet: 1 gm, form: Tab, Oral, QID, first dose 11/25/21 17:00:00 CST  Colace 100 mg oral capsule: 100 mg, form: Cap, Oral, BID, first dose 11/25/21 13:41:00 CST  Dulcolax Laxative 10 mg RECTAL suppository: 10 mg, form: Supp, WI (rectal), Daily PRN for constipation, first dose  11/25/21 12:39:00 CST  MiraLax (polyethylene glycol 3350): 17 gm, form: Powder-Recon, Oral, BID, first dose 11/25/21 13:40:00 CST  Robitussin: 200 mg, form: Soln, Oral, q4hr PRN for cough, first dose 11/28/21 0:36:00 CST  Triumeq oral tablet: 1 tab, form: Tab, Oral, Daily, first dose 11/26/21 9:00:00 CST  Xopenex 1.25 mg/3 mL inhalation solution: 1.25 mg, form: Soln-Inh, NEB, QID Resp PRN for wheezing, first dose 11/27/21 12:00:00 CST  Zofran: 4 mg, form: Injection, IV Push, q4hr PRN for nausea, first dose 11/25/21 7:56:00 CST, choose first if ordered with other treatments for nausea  acetaminophen: 1,000 mg, form: Tab, Oral, q6hr PRN for pain, mild, first dose 11/25/21 7:56:00 CST, mild/moderate  acetaminophen: 650 mg, form: Tab, Oral, q4hr PRN for fever, first dose 11/25/21 7:28:00 CST  clarithromycin: 500 mg, form: Tab Other - see special instructions, Oral, BID, first dose 11/25/21 21:00:00 CST, AIDS  dextromethorphan-guaifenesin 10 mg-100 mg/5 mL oral liquid: 10 mL, form: Soln, Oral, q6hr PRN for cough, first dose 11/28/21 11:00:00 CST, ***** WASTE SORT CODE:  [facility] *****  dolutegravir 50 mg oral tablet: 50 mg, form: Tab, Oral, Daily, first dose 11/26/21 9:00:00 CST  ergocalciferol: 50,000 units, form: Cap, Oral, q7day, first dose 11/27/21 9:00:00 CST  ethambutol: 800 mg, form: Tab, Oral, Daily, first dose 11/26/21 9:00:00 CST  fluticasone 50 mcg/inh nasal spray: 1 spray(s), 50 mcg =, form: Spray-Nasal, Both Nostrils, BID, first dose 11/28/21 21:00:00 CST  folic acid 1 mg oral tablet: 1 mg, form: Tab, Oral, Daily, first dose 11/26/21 9:00:00 CST  ketoconazole 2% topical cream: 1 suzanne, form: Cream, TOP, BID, first dose 11/25/21 13:10:00 CST  megestrol 40 mg/mL oral suspension: 400 mg, form: Susp, Oral, BID, first dose 11/25/21 21:00:00 CST  metoprolol tartrate 25 mg oral tab: 25 mg, form: Tab, Oral, BID, first dose 11/25/21 21:00:00 CST  predniSONE: 20 mg, form: Tab, Oral, q24hr, Order duration: 14 day(s), first  dose 21 15:00:00 CST, stop date 21 14:59:00 CST  rifampin 300 mg oral capsule: 300 mg, form: Cap, Oral, BID, first dose 21 21:00:00 CST  Prescriptions  Prescribed  Bactrim  mg-160 mg oral tablet: 1 tab(s), Oral, Daily, # 90 tab(s), 1 Refill(s), Pharmacy: [facility], 167.64, cm, Height/Length Dosing, 21 10:36:00 CST, 61, kg, Weight Dosing, 21 10:36:00 CST  Carafate 1 g oral tablet: 1 gm = 1 tab(s), Oral, QID, # 28 tab(s), 0 Refill(s)  Seroquel 25 mg oral tablet: 25 mg = 1 tab(s), Oral, At Bedtime, # 30 tab(s), 0 Refill(s), Pharmacy: [facility], 165, cm, Height/Length Dosing, 10/19/21 16:30:00 CDT, 61.5, kg, Weight Dosing, 10/19/21 16:30:00 CDT  Seroquel 25 mg oral tablet: 25 mg = 1 tab(s), Oral, At Bedtime, # 30 tab(s), 0 Refill(s), Pharmacy: [facility], 167.64, cm, Height/Length Dosing, 21 15:56:00 CST, 61.5, kg, Weight Dosing, 21 15:56:00 CST  Triumeq oral tablet: 1 tab(s), Oral, Daily, # 90 tab(s), 1 Refill(s), Pharmacy: [facility], 167.64, cm, Height/Length Dosing, 21 10:36:00 CST, 61, kg, Weight Dosing, 21 10:36:00 CST  Zofran ODT 8 mg oral tablet, disintegratin mg = 1 tab(s), Oral, TID, PRN PRN nausea, # 30 tab(s), 0 Refill(s), Pharmacy: [facility], 167.64, cm, Height/Length Dosing, 21 10:36:00 CST, 61, kg, Weight Dosing, 21 10:36:00 CST  clarithromycin 500 mg oral tablet: 500 mg = 1 tab(s), Oral, BID, # 60 tab(s), 1 Refill(s), Pharmacy: [facility], 167.64, cm, Height/Length Dosing, 21 10:36:00 CST, 61, kg, Weight Dosing, 21 10:36:00 CST  dolutegravir 50 mg oral tablet: 50 mg = 1 tab(s), Oral, Daily, # 90 tab(s), 1 Refill(s), Pharmacy: [facility], 167.64, cm, Height/Length Dosing, 21 10:36:00 CST, 61, kg, Weight Dosing, 21 10:36:00 CST  ethambutol 400  mg oral tablet: 800 mg = 2 tab(s), Oral, Daily, # 60 tab(s), 1 Refill(s), Pharmacy: [facility], 167.64, cm, Height/Length Dosing, 11/25/21 10:36:00 CST, 61, kg, Weight Dosing, 11/25/21 10:36:00 CST  folic acid 1 mg oral tablet: 1 mg = 1 tab(s), Oral, Daily, # 30 tab(s), 0 Refill(s), Pharmacy: [facility], 167.64, cm, Height/Length Dosing, 11/20/21 15:56:00 CST, 61.5, kg, Weight Dosing, 11/20/21 15:56:00 CST  megestrol 40 mg/mL oral suspension: 400 mg = 10 mL, Oral, BID, # 600 mL, 1 Refill(s), Pharmacy: [facility], 165, cm, Height/Length Dosing, 11/18/21 7:35:00 CST, 61.9, kg, Weight Dosing, 11/18/21 7:35:00 CST  metoprolol tartrate 25 mg oral tab: 25 mg = 1 tab(s), Oral, BID, # 60 tab(s), 0 Refill(s), Pharmacy: [facility], 167.64, cm, Height/Length Dosing, 11/20/21 15:56:00 CST, 61.5, kg, Weight Dosing, 11/20/21 15:56:00 CST  metoprolol tartrate 25 mg oral tab: 25 mg = 1 tab(s), Oral, BID, # 60 tab(s), 4 Refill(s), Pharmacy: [facility], 165, cm, Height/Length Dosing, 11/18/21 7:35:00 CST, 61.9, kg, Weight Dosing, 11/18/21 7:35:00 CST  rifampin 300 mg oral capsule: 300 mg = 1 cap(s), Oral, BID, # 60 cap(s), 1 Refill(s), Pharmacy: [facility], 167.64, cm, Height/Length Dosing, 11/25/21 10:36:00 CST, 61, kg, Weight Dosing, 11/25/21 10:36:00 CST  Documented Medications  Documented  cyclobenzaprine 10 mg oral tablet: 10 mg = 1 tab(s), Oral, TID,    Medications (23) Active  Scheduled: (16)  clarithromycin 500 mg Tab UD  500 mg 1 tab(s), Oral, BID  docusate sodium 100 mg Cap UD  100 mg 1 cap(s), Oral, BID  dolutegravir 50 mg Tab  50 mg 1 tab(s), Oral, Daily  ergocalciferol 50,000 units Cap  50,000 units 1 cap(s), Oral, q7day  ethambutol 400 mg Tab UD  800 mg 2 tab(s), Oral, Daily  fluticasone nasal 0.05 mg/inh Spr  50 mcg 1 spray(s), Both Nostrils, BID  folic acid 1 mg Tab UD  1 mg 1 tab(s), Oral,  Daily  ketoconazole topical 2% Cre  1 suzanne, TOP, BID  megestrol 40 mg/mL Layne UD (10mL)  400 mg 10 mL, Oral, BID  metoprolol tart. 25 mg Tab UD  25 mg 1 tab(s), Oral, BID  polyethylene glycol (Miralax 17 gm pkt)  17 gm 1 packet(s), Oral, BID  prednisone 20 mg Tab UD  20 mg 1 tab(s), Oral, q24hr  rifampin 300 mg Cap  300 mg 1 cap(s), Oral, BID  sucralfate 1 gm tablet  1 gm 1 tab(s), Oral, QID  sulfamethox-trimethop 800-160mg Tab  1 tab(s), Oral, Daily  Triumeq oral tablet  1 tab, Oral, Daily  Continuous: (0)  PRN: (7)  acetaminophen 325 mg Tab  650 mg 2 tab(s), Oral, q4hr  acetaminophen 500 mg Tab  1,000 mg 2 tab(s), Oral, q6hr  bisacodyl 10 mg Sup  10 mg 1 supp, DE (rectal), Daily  dextromethor-guaif (UD) 10 mg-100 mg/5 mL Sol UD  10 mL, Oral, q6hr  guaifenesin 200 mg/10 mL Sol -UD 10mL  200 mg 10 mL, Oral, q4hr  levalbuterol 1.25 mg/3 mL Sol UD  1.25 mg 3 mL, NEB, QID Resp  ondansetron 2 mg/mL inj - 2mL  4 mg 2 mL, IV Push, q4hr     Problem list:    All Problems  Malnutrition / SNOMED CT 784055775 / Confirmed  AIDS / SNOMED CT 258036713 / Confirmed  Resolved: HIV disease / SNOMED CT 2645604603  Resolved: HIV / SNOMED CT 73284309  Canceled: HIV / SNOMED CT 18233932  Canceled: No Chronic Problems / Cerner NKP,    Active Problems (2)  AIDS   Malnutrition         Histories   Past Medical History:    Resolved  HIV disease ([id number]):  Resolved.  HIV ([id number]):  Resolved.   Family History:    Diabetes mellitus type 2  Brother  Migraine  Father  Hypertension.  Father  Mother  Brother  Anxiety.  Father     Procedure history:    denies.   Social History        Social & Psychosocial Habits    Alcohol  04/03/2018  Use: Current    Type: Liquor    Frequency: 1-2 times per year    Comment: Once a year - 04/03/2018 11:01 Shaniqua Morris RN, [place] [facility]    Employment/School  04/03/2018  Status: Employed    Exercise  04/03/2018  Times per week: Daily    Self assessment: Fair condition    Exercise type: Walking, Weight lifting, Works  offshore    Home/Environment  04/03/2018  Lives with: Alone    Nutrition/Health  04/03/2018  Type of diet: Regular    Sexual  04/03/2018  Sexually active: No    Substance Use  09/01/2014 Risk Assessment: Denies Substance Abuse    05/26/2016  Use: Never    Tobacco  09/01/2014 Risk Assessment: Denies Tobacco Use    11/18/2021  Use: Never (less than 100 in l    Patient Wants Consult For Cessation Counseling N/A    11/20/2021  Use: Never (less than 100 in l    Patient Wants Consult For Cessation Counseling N/A    11/25/2021  Use: Never (less than 100 in l    Patient Wants Consult For Cessation Counseling No    Abuse/Neglect  11/18/2021  SHX Any signs of abuse or neglect No    Feels unsafe at home: No    Safe place to go: Yes    11/20/2021  SHX Any signs of abuse or neglect No    Feels unsafe at home: No    Safe place to go: Yes    11/25/2021  SHX Any signs of abuse or neglect No  .        Physical Examination      Vital Signs (last 24 hrs)_____  Last Charted___________  Temp Oral     36.7 DegC  (NOV 30 11:19)  Heart Rate Peripheral   83 bpm  (NOV 30 11:19)  Resp Rate         18 br/min  (NOV 30 11:19)  SBP      H 151mmHg  (NOV 30 11:19)  DBP      H 98mmHg  (NOV 30 11:19)  SpO2      100 %  (NOV 30 11:19)     General:  Alert and oriented, No acute distress, Looks chronically ill.    Eye:  Pupils are equal, round and reactive to light, Extraocular movements are intact.    HENT:  Normocephalic.    Neck:  Supple, Non-tender.         Lymph nodes: Bilateral, few, very tiny, subcm LN.    Respiratory:  Lungs are clear to auscultation, Respirations are non-labored.    Cardiovascular:  Normal rate, Regular rhythm.    Gastrointestinal:  Soft.         Abdomen: Distended, Tenderness (very mild and generalized), No guarding, Not rigid, No rebound tenderness.         Bowel sounds: Present.    Lymphatics:       Lymphatic exam: Bilateral, Cervical chain, Axillary, Inguinal, Lymphadenopathy, very few LN. Subcm, non tender. .     Musculoskeletal:  No deformity.    Neurologic:  Alert and oriented.         Functional cognition: Intact.         Cranial nerves: Intact.       Health Maintenance      Health Maintenance     Pending (in the next year)        OverDue           Alcohol Misuse Screening due  01/02/21  and every 1  year(s)        Due In Future            Obesity Screening not due until  01/01/22  and every 1  year(s)           Depression Screening not due until  11/18/22  and every 1  year(s)           ADL Screening not due until  11/18/22  and every 1  year(s)           Body Mass Index Check not due until  11/26/22  and every 1  year(s)     Satisfied (in the past 1 year)        Satisfied            ADL Screening on  11/18/21.  Satisfied by Camila Vargas           Blood Pressure Screening on  11/30/21.  Satisfied by Ligia Romo           Body Mass Index Check on  11/25/21.  Satisfied by Amaury Marie           Depression Screening on  11/18/21.  Satisfied by Camila Vargas           Diabetes Screening on  11/30/21.  Satisfied by Libra Abbott           Influenza Vaccine on  11/25/21.  Satisfied by Amaury Marie           Obesity Screening on  11/25/21.  Satisfied by Amaury Marie          Review / Management   Results review:     Labs (Last four charted values)  WBC                  L 3.2 (NOV 30) 4.8 (NOV 29) 4.9 (NOV 28) L 4.3 (NOV 28)   Hgb                  L 9.3 (NOV 30) L 10.2 (NOV 29) L 7.6 (NOV 28) L 7.8 (NOV 28)   Hct                  L 26.8 (NOV 30) L 31.7 (NOV 29) L 22.4 (NOV 28) L 23.2 (NOV 28)   Plt                  H 404 (NOV 30) 396 (NOV 29) 354 (NOV 28) 313 (NOV 28)   Na                   L 134 (NOV 30) L 135 (NOV 29) L 135 (NOV 28) L 132 (NOV 27)   K                    4.3 (NOV 30) 4.3 (NOV 29) 3.5 (NOV 28) 4.0 (NOV 27)   CO2                  L 20 (NOV 30) L 21 (NOV 29) L 16 (NOV 28) L 16 (NOV 27)   Cl                   106 (NOV 30) 105 (NOV 29) H 108 (NOV 28) 104 (NOV  27)   Cr                   L 0.65 (NOV 30) 0.74 (NOV 29) L 0.64 (NOV 28) 0.74 (NOV 27)   BUN                  L 6.7 (NOV 30) L 4.7 (NOV 29) L 4.0 (NOV 28) L 6.5 (NOV 27)   Glucose Random       87 (NOV 30) 92 (NOV 29) H 118 (NOV 28) 90 (NOV 27) .    Radiology results   Rad Results (ST)   CT angiogram 11/20/2021:  Shortness of breath  Findings  PULMONARY ARTERY:No evidence of pulmonary thromboembolism.  AORTA: Normal in course and caliber.  THYROID GLAND: The visualized thyroid is unremarkable.  AIRWAYS: Trachea is midline and tracheobronchial tree is patent.   HEART: The heart is normal in size. There is no pericardial effusion.   LUNGS: There are no new masses or nodules identified. No pleural  effusion or pneumothorax.  LYMPH NODES: Enlarged mediastinal lymph nodes extending into the  bilateral vj. On the right largest conglomeration of lymph nodes  measures approximately 1.8 x 3.3 cm. This concepcion prominence is seen  throughout the mediastinum as well as enlarged lymph node in the left  neck measuring 1.3 cm (series 4 image 13).  BONES: No displaced fracture. No aggressive appearing osseous lesion  identified.  UPPER ABDOMEN: The visualized abdomen is unremarkable.  Impression  No evidence of pulmonary thromboembolism.      Pulmonary findings have resolved in the interval with mediastinal  adenopathy now present. Findings are most likely reactive from recent  infectious process, however recommend continued short interval  follow-up to resolution to exclude lymphoma.    Signature Line  Electronically Signed By: Harry Ferrari MD  Date/Time Signed: 11/20/2021 10:18         Impression and Plan   Impression:    HIV/AIDS  Lymphadenopathy--palpable lymph nodes are not impressive, are small and most likely reactive. If need biopsy need to be from a larger LN (> 1 cm)  Fever--afebrile > 24 hrs  Anemia  Leukopenia  Abdominal pain  Constipation  Hyponatremia  Recent PJP pneumonia and disseminated MAC      Plan:    Patient with history of HIV/AIDS presented with abdominal pain, fever, and lymphadenopathy.  Recently discharged from the hospital for PJP pneumonia and disseminated MAC.  Is currently on rifampin, ethambutol and clarithromycin.  He is also on ART therapy for HIV that was restarted on 10/11/2021.  He is also on dolutegravir since he started Rifampin.  He completed treatment course of Bactrim for his PJP on 11/8/2021.  Blood cultures negative.  He did have blood cultures positive for MAC during the 10/19/2021 hospitalization.  He is on so on prednisone that he will need to continue for at least 2 more weeks.    Patient lymphadenopathy is relatively acute as this lymphadenopathy was not as prominent back in October of this year.  Most likely related to his disease/infection but lymphoma is on the differential. Patient reluctant to undergo any type of procedure, excisional lymph node biopsy, at this time.  He would like to give time to the medications to work and reevaluate in couple of months.    Agree with ID service: Plan to repeat CT of the abdomen and pelvis in about 2 months to reevaluate lymphadenopathy. I will add chest as well to eval mediastinal LAD. If LAD worsen he will need Excisional LN biopsy.   Cont antibiotics and ART Rx as per ID service      Thank you for the consult  I will be available for any questions      AYANA QUARLES MD

## 2022-04-30 NOTE — ED PROVIDER NOTES
"   Patient:   Azael Hodge             MRN: [id number]            FIN: [id number]               Age:   31 years     Sex:  Male     :  1990   Associated Diagnoses:   Abdominal pain; History of AIDS; Hyponatremia; Abdominal lymphadenopathy   Author:   Barber Gerardo MD      Report Summary       General:       Alert, no acute distress.       Basic Information   Time seen: Immediately upon arrival.   History source: Patient, mother.   Arrival mode: Private vehicle.   History limitation: None.   Additional information: Chief Complaint from Nursing Triage Note : Chief Complaint   2021 2:09 CST      Chief Complaint           Pt reports abd pain and lower back pain, pt report nausea, denies any vomiting. Pt is a poor historian and has difficulty describing what is wrong with him. Pt has AIDS , and recently admitted for pneumonia. Pt and spouse report that he is "blocked"  .   Provider/Visit info:   Time Seen:  Barber Gerardo MD / 2021 02:40  .   History of Present Illness   The patient presents with abdominal pain.  The onset was 3  days ago.  The course/duration of symptoms is constant and worsening.  The character of symptoms is achy, crampy and dull.  The degree at onset was moderate.  The Location of pain at onset was diffuse.  The degree at present is moderate.  The Location of pain at present is diffuse.  Radiating pain: none. The exacerbating factor is eating.  The relieving factor is none.  Therapy today: none.  Risk factors consist of none.  Associated symptoms: nausea, back pain, chills, denies shortness of breath and denies fever.  Young male with a history of HIV AIDS currently on ART therapy here with mom noting several days of intermittent but worsening generalized abdominal pain of which he points periumbilical.  Also note nausea and very poor appetite.  Chills but no fever.  Does state that he has a chronic issue with constipation and has last bowel movement about 2 days ago and has " had flatus since then.  Endorses dark urine but no dysuria.  He states that his abdominal pain at times radiates to the back..        Review of Systems   Constitutional symptoms:  Negative except as documented in HPI.   Skin symptoms:  Negative except as documented in HPI.   Eye symptoms:  Negative except as documented in HPI.   ENMT symptoms:  Negative except as documented in HPI.   Respiratory symptoms:  Negative except as documented in HPI.   Cardiovascular symptoms:  Negative except as documented in HPI.   Gastrointestinal symptoms:  Negative except as documented in HPI.   Genitourinary symptoms:  Negative except as documented in HPI.   Musculoskeletal symptoms:  Negative except as documented in HPI.   Neurologic symptoms:  Negative except as documented in HPI.   Psychiatric symptoms:  Negative except as documented in HPI.   Endocrine symptoms:  Negative except as documented in HPI.   Hematologic/Lymphatic symptoms:  Negative except as documented in HPI.   Allergy/immunologic symptoms:  Negative except as documented in HPI.      Health Status   Allergies:    Allergic Reactions (Selected)  High  Banana- Pharyngeal edema.  Severity Not Documented  Benzodiazepines- Hallucinations.,    Allergies (2) Active Reaction  Banana Pharyngeal edema  benzodiazepines Hallucinations  .   Medications:  (Selected)   Inpatient Medications  Ordered  Lactated Ringers 1000ml 1,000 mL: 1,000 mL, 1,000 mL, IV, 1,000 mL/hr, start date 11/25/21 3:02:00 CST, 1.69, m2  Normal Saline (0.9% NS) IV 1,000 mL: 1,000 mL, 1,000 mL, IV, 125 mL/hr, start date 11/25/21 5:16:00 CST, 1.69, m2  Prescriptions  Prescribed  Bactrim  mg-160 mg oral tablet: 1 tab(s), Oral, Daily, # 90 tab(s), 0 Refill(s), Pharmacy: [facility], 165, cm, Height/Length Dosing, 11/18/21 7:35:00 CST, 61.9, kg, Weight Dosing, 11/18/21 7:35:00 CST  Carafate 1 g oral tablet: 1 gm = 1 tab(s), Oral, QID, # 28 tab(s), 0 Refill(s)  Seroquel 25 mg oral tablet:  25 mg = 1 tab(s), Oral, At Bedtime, # 30 tab(s), 0 Refill(s), Pharmacy: [facility], 165, cm, Height/Length Dosing, 10/19/21 16:30:00 CDT, 61.5, kg, Weight Dosing, 10/19/21 16:30:00 CDT  Seroquel 25 mg oral tablet: 25 mg = 1 tab(s), Oral, At Bedtime, # 30 tab(s), 0 Refill(s), Pharmacy: [facility], 167.64, cm, Height/Length Dosing, 11/20/21 15:56:00 CST, 61.5, kg, Weight Dosing, 11/20/21 15:56:00 CST  Triumeq oral tablet: 1 tab(s), Oral, Daily, # 90 tab(s), 0 Refill(s), Pharmacy: [facility], 165, cm, Height/Length Dosing, 11/18/21 7:35:00 CST, 61.9, kg, Weight Dosing, 11/18/21 7:35:00 CST  clarithromycin 500 mg oral tablet: 500 mg = 1 tab(s), Oral, BID, # 60 tab(s), 1 Refill(s), Pharmacy: [facility], 165, cm, Height/Length Dosing, 11/18/21 7:35:00 CST, 61.9, kg, Weight Dosing, 11/18/21 7:35:00 CST  dolutegravir 50 mg oral tablet: 50 mg = 1 tab(s), Oral, Daily, # 90 tab(s), 0 Refill(s), Pharmacy: [facility], 165, cm, Height/Length Dosing, 11/18/21 7:35:00 CST, 61.9, kg, Weight Dosing, 11/18/21 7:35:00 CST  ethambutol 400 mg oral tablet: 800 mg = 2 tab(s), Oral, Daily, # 60 tab(s), 1 Refill(s), 167.64, cm, Height/Length Dosing, 11/20/21 15:56:00 CST, 61.5, kg, Weight Dosing, 11/20/21 15:56:00 CST  folic acid 1 mg oral tablet: 1 mg = 1 tab(s), Oral, Daily, # 30 tab(s), 0 Refill(s), Pharmacy: [facility], 167.64, cm, Height/Length Dosing, 11/20/21 15:56:00 CST, 61.5, kg, Weight Dosing, 11/20/21 15:56:00 CST  megestrol 40 mg/mL oral suspension: 400 mg = 10 mL, Oral, BID, # 600 mL, 1 Refill(s), Pharmacy: [facility], 165, cm, Height/Length Dosing, 11/18/21 7:35:00 CST, 61.9, kg, Weight Dosing, 11/18/21 7:35:00 CST  metoprolol tartrate 25 mg oral tab: 25 mg = 1 tab(s), Oral, BID, # 60 tab(s), 0 Refill(s), Pharmacy: [facility], 167.64, cm, Height/Length Dosing,  11/20/21 15:56:00 CST, 61.5, kg, Weight Dosing, 11/20/21 15:56:00 CST  metoprolol tartrate 25 mg oral tab: 25 mg = 1 tab(s), Oral, BID, # 60 tab(s), 4 Refill(s), Pharmacy: [facility], 165, cm, Height/Length Dosing, 11/18/21 7:35:00 CST, 61.9, kg, Weight Dosing, 11/18/21 7:35:00 CST  naproxen 500 mg oral tablet: 500 mg = 1 tab(s), Oral, BID, X 3 week(s), # 42 tab(s), 0 Refill(s)  rifampin 300 mg oral capsule: 300 mg = 1 cap(s), Oral, BID, X 30 day(s), # 60 cap(s), 1 Refill(s), Pharmacy: [facility], 165, cm, Height/Length Dosing, 11/18/21 7:35:00 CST, 61.9, kg, Weight Dosing, 11/18/21 7:35:00 CST  Documented Medications  Documented  cyclobenzaprine 10 mg oral tablet: 10 mg = 1 tab(s), Oral, TID  ketoconazole 2% topical cream: 1 suzanne, TOP, BID, 0 Refill(s)  ketoconazole 2% topical shampoo: TOP, 2x/Wk.      Past Medical/ Family/ Social History   Medical history:    Resolved  HIV disease ([id number]):  Resolved.  HIV ([id number]):  Resolved..   Surgical history:    denies..   Family history:    Diabetes mellitus type 2  Brother  Migraine  Father  Hypertension.  Father  Mother  Brother  Anxiety.  Father  .   Social history:    Social & Psychosocial Habits    Alcohol  04/03/2018  Use: Current    Type: Liquor    Frequency: 1-2 times per year    Comment: Once a year - 04/03/2018 11:01 - Asia Morris RN    Employment/School  04/03/2018  Status: Employed    Exercise  04/03/2018  Times per week: Daily    Self assessment: Fair condition    Exercise type: Walking, Weight lifting, Works offshore    Home/Environment  04/03/2018  Lives with: Alone    Nutrition/Health  04/03/2018  Type of diet: Regular    Sexual  04/03/2018  Sexually active: No    Substance Use  09/01/2014 Risk Assessment: Denies Substance Abuse    05/26/2016  Use: Never    Tobacco  09/01/2014 Risk Assessment: Denies Tobacco Use    11/18/2021  Use: Never (less than 100 in l    Patient Wants Consult For Cessation Counseling  N/A    11/20/2021  Use: Never (less than 100 in l    Patient Wants Consult For Cessation Counseling N/A    11/25/2021  Use: Never (less than 100 in l    Patient Wants Consult For Cessation Counseling No    Abuse/Neglect  11/18/2021  SHX Any signs of abuse or neglect No    Feels unsafe at home: No    Safe place to go: Yes    11/20/2021  [facility]X Any signs of abuse or neglect No    Feels unsafe at home: No    Safe place to go: Yes    11/25/2021  [facility]X Any signs of abuse or neglect No  .   Problem list:    Active Problems (2)  AIDS   Malnutrition   .      Physical Examination               Vital Signs   Vital Signs   11/25/2021 4:45 CST      Peripheral Pulse Rate     102 bpm  HI                             Heart Rate Monitored      102 bpm  HI                             Respiratory Rate          20 br/min                             SpO2                      99 %                             Oxygen Therapy            Room air                             Systolic Blood Pressure   130 mmHg                             Diastolic Blood Pressure  78 mmHg                             Mean Arterial Pressure, Cuff              95 mmHg    11/25/2021 3:45 CST      Peripheral Pulse Rate     114 bpm  HI                             Heart Rate Monitored      112 bpm  HI                             Respiratory Rate          20 br/min                             SpO2                      100 %                             Oxygen Therapy            Room air                             Systolic Blood Pressure   150 mmHg  HI                             Diastolic Blood Pressure  94 mmHg  HI                             Mean Arterial Pressure, Cuff              113 mmHg    11/25/2021 2:43 CST      Peripheral Pulse Rate     108 bpm  HI                             Heart Rate Monitored      107 bpm  HI                             Respiratory Rate          20 br/min                             SpO2                      99 %                              Oxygen Therapy            Room air                             Systolic Blood Pressure   149 mmHg  HI                             Diastolic Blood Pressure  94 mmHg  HI                             Mean Arterial Pressure, Cuff              112 mmHg    11/25/2021 2:09 CST      Temperature Oral          37.1 DegC                             Temperature Oral (calculated)             98.78 DegF                             Peripheral Pulse Rate     114 bpm  HI                             Respiratory Rate          24 br/min                             SpO2                      99 %                             Oxygen Therapy            Room air                             Systolic Blood Pressure   131 mmHg                             Diastolic Blood Pressure  87 mmHg  .      Vital Signs (last 24 hrs)_____  Last Charted___________  Temp Oral     37.1 DegC  (NOV 25 02:09)  Heart Rate Peripheral   H 102bpm  (NOV 25 04:45)  Resp Rate         20 br/min  (NOV 25 04:45)  SBP      130 mmHg  (NOV 25 04:45)  DBP      78 mmHg  (NOV 25 04:45)  SpO2      99 %  (NOV 25 04:45)  .   Measurements   11/25/2021 2:09 CST      Weight Dosing             61 kg                             Weight Measured and Calculated in Lbs     134.48 lb                             Weight Estimated          61 kg                             Height/Length Dosing      167.64 cm                             Height/Length Estimated   167.64 cm                             Body Mass Index Estimated 21.71 kg/m2  .   Basic Oxygen Information   11/25/2021 4:45 CST      SpO2                      99 %                             Oxygen Therapy            Room air    11/25/2021 3:45 CST      SpO2                      100 %                             Oxygen Therapy            Room air    11/25/2021 2:43 CST      SpO2                      99 %                             Oxygen Therapy            Room air    11/25/2021 2:09 CST      SpO2                      99 %                              Oxygen Therapy            Room air  .   General:  Alert, no acute distress.    Skin:  Warm, dry, no rash, pale.    Head:  Normocephalic, atraumatic.    Neck:  Supple, trachea midline, no JVD.    Eye:  Pupils are equal, round and reactive to light, extraocular movements are intact, normal conjunctiva, vision unchanged.    Ears, nose, mouth and throat:  Tympanic membranes clear, oral mucosa moist, no pharyngeal erythema or exudate.    Cardiovascular:  Regular rate and rhythm, No murmur, No edema.    Respiratory:  Lungs are clear to auscultation, respirations are non-labored, breath sounds are equal, Symmetrical chest wall expansion.    Chest wall:  No tenderness.   Back:  Normal range of motion.   Musculoskeletal:  Normal ROM, normal strength, no tenderness.    Gastrointestinal:  Soft, No organomegaly, abdominal distention, Tenderness: Moderate, periumbilical, Guarding: Negative, Rebound: Negative, Bowel sounds: Normal.    Neurological:  Alert and oriented to person, place, time, and situation, No focal neurological deficit observed, CN II-XII intact, normal sensory observed, normal motor observed, normal speech observed.    Psychiatric:  Cooperative, appropriate mood & affect, normal judgment.             Medical Decision Making   Differential Diagnosis:  Abdominal pain, Appendicitis, bowel obstruction, cholecystitis, gastritis.    Rationale:  Young male with a history of HIV here for persistent abdominal pain, anorexia.  Moderate tenderness periumbilical on exam, tachycardic but normotensive, afebrile.  Labs notable for hyponatremia and already been given a liter of LR and will continue on maintenance normal saline, no leukocytosis however there was some delay in the differential so awaiting lymphocyte count to correlate with CD4.  No significant evidence of infection on UA.  Resting comfortably after Toradol and Zofran.  CT without acute abnormalities however revealed multiple enlarged discrete lymph  nodes at the juan antonio hepatis, celiac region, root of the mesentery, retrocrural space and retroperitoneum that could potentially represent lymphoma.  They also notes some trace pericholecystic fluid although patient not tender in this area.  We will continue hydration and admit patient for hyponatremia and for likely need for lymphoma work-up..   Results review:     Labs (Last four charted values)  WBC                  6.0 (NOV 25)   Hgb                  L 11.0 (NOV 25)   Hct                  L 32.6 (NOV 25)   Plt                  230 (NOV 25)   Na                   L 122 (NOV 25)   K                    4.1 (NOV 25)   CO2                  L 15 (NOV 25)   Cl                   99 (NOV 25)   Cr                   0.92 (NOV 25)   BUN                  14.5 (NOV 25)   Glucose Random       H 123 (NOV 25) .      Reexamination/ Reevaluation   Vital signs   Basic Oxygen Information   11/25/2021 4:45 CST      SpO2                      99 %                             Oxygen Therapy            Room air    11/25/2021 3:45 CST      SpO2                      100 %                             Oxygen Therapy            Room air    11/25/2021 2:43 CST      SpO2                      99 %                             Oxygen Therapy            Room air    11/25/2021 2:09 CST      SpO2                      99 %                             Oxygen Therapy            Room air        Impression and Plan   Diagnosis   Abdominal pain (PNED [id number])   History of AIDS (FTW86-OH B20)   Hyponatremia (NOF00-SD E87.1)   Abdominal lymphadenopathy (BKW46-ZW R59.0)      Calls-Consults   -  11/25/2021 06:05:00 , Clinton BALLARD, Luther POLK    Plan   Condition: Improved, Stable.    Disposition: Admit to Inpatient Unit.    Counseled: Patient, Family, Regarding diagnosis, Regarding diagnostic results, Regarding treatment plan, Patient indicated understanding of instructions.

## 2022-05-03 ENCOUNTER — HOSPITAL ENCOUNTER (OUTPATIENT)
Dept: RADIOLOGY | Facility: HOSPITAL | Age: 32
Discharge: HOME | End: 2022-05-03
Attending: INTERNAL MEDICINE
Payer: MEDICAID

## 2022-05-03 DIAGNOSIS — B20 HUMAN IMMUNODEFICIENCY VIRUS (HIV) DISEASE: ICD-10-CM

## 2022-05-03 PROCEDURE — 74177 CT ABD & PELVIS W/CONTRAST: CPT | Mod: TC

## 2022-05-03 PROCEDURE — 71260 CT THORAX DX C+: CPT | Mod: TC

## 2022-05-03 PROCEDURE — 25500020 PHARM REV CODE 255: Performed by: INTERNAL MEDICINE

## 2022-05-03 RX ADMIN — DIATRIZOATE MEGLUMINE AND DIATRIZOATE SODIUM 30 ML: 660; 100 LIQUID ORAL; RECTAL at 04:05

## 2022-05-03 RX ADMIN — IOPAMIDOL 100 ML: 755 INJECTION, SOLUTION INTRAVENOUS at 04:05

## 2022-05-06 RX ORDER — RIFAMPIN 300 MG/1
300 CAPSULE ORAL 2 TIMES DAILY
COMMUNITY
Start: 2022-01-20 | End: 2022-07-26 | Stop reason: SDUPTHER

## 2022-05-06 RX ORDER — ERGOCALCIFEROL 1.25 MG/1
50000 CAPSULE ORAL WEEKLY
COMMUNITY
Start: 2022-04-21 | End: 2022-07-20

## 2022-05-06 RX ORDER — ABACAVIR SULFATE, DOLUTEGRAVIR SODIUM, LAMIVUDINE 600; 50; 300 MG/1; MG/1; MG/1
1 TABLET, FILM COATED ORAL DAILY
COMMUNITY
Start: 2022-04-12 | End: 2022-07-26 | Stop reason: SDUPTHER

## 2022-05-06 RX ORDER — ETHAMBUTOL HYDROCHLORIDE 400 MG/1
800 TABLET, FILM COATED ORAL DAILY
COMMUNITY
Start: 2022-04-12 | End: 2022-07-26 | Stop reason: SDUPTHER

## 2022-05-06 RX ORDER — CLARITHROMYCIN 500 MG/1
500 TABLET, FILM COATED ORAL 2 TIMES DAILY
COMMUNITY
Start: 2022-01-20 | End: 2022-07-26 | Stop reason: SDUPTHER

## 2022-05-09 ENCOUNTER — TELEPHONE (OUTPATIENT)
Dept: HEMATOLOGY/ONCOLOGY | Facility: CLINIC | Age: 32
End: 2022-05-09
Payer: MEDICAID

## 2022-05-09 PROBLEM — D72.819 LEUKOPENIA: Status: ACTIVE | Noted: 2022-05-09

## 2022-05-09 PROBLEM — R59.1 LYMPHADENOPATHY: Status: ACTIVE | Noted: 2022-05-09

## 2022-05-09 PROBLEM — D50.0 ANEMIA DUE TO CHRONIC BLOOD LOSS: Status: ACTIVE | Noted: 2022-05-09

## 2022-05-09 PROBLEM — B20 HUMAN IMMUNODEFICIENCY VIRUS (HIV) DISEASE: Status: ACTIVE | Noted: 2022-05-09

## 2022-05-09 NOTE — PROGRESS NOTES
HEMATOLOGY/ONCOLOGY OFFICE CLINIC VISIT    Visit Information:    Initial Evaluation: 11/30/2021  [facility]  Other providers:  Code status:    Diagnosis:  Lymphadenopathy  HIV +    Present treatment: N/A    Treatment/Oncology history: N/A    Plan: surveillance    Imaging:  CT angiogram 11/20/2021:No evidence of pulmonary thromboembolism. Enlarged mediastinal lymph nodes extending into the bilateral vj. On the right largest conglomeration of lymph nodes measures approximately 1.8 x 3.3 cm. This concepcion prominence is seen throughout the mediastinum as well as enlarged lymph node in the left neck measuring 1.3 cm. Pulmonary findings have resolved in the interval with mediastinal adenopathy now present. Findings are most likely reactive from recent infectious process, however recommend continued short interval follow-up to resolution to exclude lymphoma.   CT Thorax 1/19/2022: Clustered solid nodules within the posterior portion of the right lower lobe. Individual nodules measure up to 6 mm. Additional scattered small solid nodules within the remaining lobes, increased in number since prior study.  Right hilar lymph node measures 15 mm in short axis, stable. A left hilar lymph node also measures 15 mm in short axis, slightly smaller since n. Subcarinal lymph node measures 20 mm stable. No new lymphadenopathy appreciated.  Left lower cervical lymph node measuring 15 mm in short axis , not significantly changed. A right lower cervical lymph node measures 13 mm, stable. Retrocrural lymph nodes are mildly larger since prior exam. Reference right retrocrural lymph node now measures 12 mm, previously 8 mm. There is a partially visualized concepcion conglomerate between the left kidney and pancreas measuring about 50 mm in transverse diameter.    CT C/A/P 5/3/2022: Resolution of right lower lobe nodules seen on prior CT.  No enlarging pulmonary nodule or consolidation on current exam. Reference right hilar lymph  node measures 9 mm in short axis, decreased from 22 mm before.  Reference left hilar lymph node also smaller, measuring about 6 mm in short axis.  Subcarinal lymph node has also decreased in size, now measuring 7 mm in short axis compared to 25 mm before.  No new lymphadenopathy identified. Abdominal/pelvic lymph nodes: Significant improvement in gastrohepatic, retrocrural and retroperitoneal lymphadenopathy since November.  For reference, a left periaortic lymph node now measures 9 mm in short axis, previously 20 mm.  Impression: 1. Improved lymphadenopathy since prior exams.  No new pathologically enlarged lymph nodes identified. 2. Decreased number of pulmonary nodules since 01/19/2022.       CLINICAL HISTORY:       Patient  Azael Hodge is a 31 y.o. male with multiple medical problems including HIV/AIDS d/c from the hospital 11/23/2021 and returned to the ER on 11/25/2021 with abd pain and lower back pain, also reports nausea and constipation, denies any vomiting. Pt is a poor historian and has difficulty describing what is wrong with him. Pt recently admitted for pneumonia. Pt denies any chest pain or SOB. He also presented  with fever.    CT angiogram 11/20/2021 done when patient presented with SOB on previous hospitalization showed enlarged mediastinal lymph nodes extending into the bilateral vj. On the right largest conglomeration of lymph nodes measures approximately 1.8 x 3.3 cm. This concepcion prominence is seen throughout the mediastinum as well as enlarged lymph node in the left neck measuring 1.3 cm. No pulmonary embolism.    Upon evaluation at the ER, CT A/P 11/25/2021: There are multiple enlarged discrete lymph nodes at the juan antonio hepatis, celiac region, root of the mesentery, retrocrural space and retroperitoneum, the largest measures approximately 3.7 cm in the left paraaortic region. Consider possible lymphoma. There is trace pericholecystic fluid. No radiodense gallstone or wall edema is seen.  Considerations include early acute cholecystitis and reaction to adjacent hepatitis. Ultrasound may be helpful for more definitive characterization if needed. Mild hepatomegaly. There is some concentric mural thickening of the anorectum which may represent proctitis.     Of note CT C/A/P 10/19/2021 with Findings consistent with bilateral atypical pneumonia. Lymph nodes: There are scattered reactive-sized mediastinal lymph nodes. No pathologic concepcion enlargement or necrotic adenopathy. Otherwise, no evidence of acute or suspicious focal abnormality within the chest, abdomen, or pelvis. Incidentally noted gallbladder sludge without calcified stone or biliary obstruction.    Blood work with normal WBCs, he was hyponatremic with a sodium of 122, acidotic with a CO2 of 15, lactic acid 1 and lipase 18.  He was tachycardic but normotensive.  UA with trace of leukocyte esterase, 2+ protein, 1+ glucose and 1+ bilirubin.  Covid rapid test was negative.  CD4 on 11/18/2021 was 101.  Patient has been followed by infectious disease services.    We were consulted to evaluate for possible lymphoma.  Patient is seen in rounds and he is laying in bed.  He is by himself.  He looks chronically ill.  Abdomen is distended but he reports that pain is much improved. Patient had 1 unit of blood transfusion on 11/28/2021 for a hemoglobin of 7.6.  Today hemoglobin 9.3.  Hyponatremia improved.  Today sodium 134, CO2 20.     He has been afebrile for 24 Hours now.        Chief Complaint: No (No Concerns today)      Interval History:  Patient presents today for follow up to discuss CT results. He reports improvement in abdominal swelling, but overall has been doing well.  CT scan of the chest abdomen and pelvis showed resolution of right lower lobe nodules and improved lymphadenopathy.  See above for details      Past Medical History:   Diagnosis Date    ADHD (attention deficit hyperactivity disorder)     Ages 5 through 13.  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 No pertinent surgical history.  Family History   Problem Relation Age of Onset    Hypertension Mother     Migraines Father     Hypertension Father     Anxiety disorder Father     Diabetes Brother     Hypertension Brother      Social Connections: Not on file       Review of patient's allergies indicates:   Allergen Reactions    Banana      Other reaction(s): Pharyngeal edema    Benzodiazepines Hallucinations      Current Outpatient Medications on File Prior to Visit   Medication Sig Dispense Refill    clarithromycin (BIAXIN) 500 MG tablet Take 500 mg by mouth 2 (two) times a day.      dolutegravir (TIVICAY) 50 mg Tab Take 50 mg by mouth once daily.      ergocalciferol (ERGOCALCIFEROL) 50,000 unit Cap Take 50,000 Int'l Units by mouth once a week.      ethambutoL (MYAMBUTOL) 400 MG Tab Take 800 mg by mouth once daily.      rifAMpin (RIFADIN) 300 MG capsule Take 300 mg by mouth 2 (two) times a day.      sulfamethoxazole-trimethoprim 800-160mg (BACTRIM DS) 800-160 mg Tab Take 1 tablet by mouth once daily.      TRIUMEQ 600- mg Tab Take 1 tablet by mouth once daily.       No current facility-administered medications on file prior to visit.      Review of Systems   Constitutional: Negative for activity change, appetite change, chills, diaphoresis, fatigue, fever and unexpected weight change.   HENT: Negative for nasal congestion, mouth sores, nosebleeds, postnasal drip, sinus pressure/congestion, sore throat and trouble swallowing.    Eyes: Negative for visual disturbance.   Respiratory: Negative for cough and shortness of breath.    Cardiovascular: Negative for chest pain and palpitations.   Gastrointestinal: Negative for abdominal distention, abdominal pain, blood in stool, change in bowel habit, constipation, diarrhea, nausea, vomiting and change in bowel habit.   Endocrine: Negative.   "  Genitourinary: Negative for bladder incontinence, decreased urine volume, difficulty urinating, dysuria, frequency, hematuria, scrotal swelling, testicular pain and urgency.   Musculoskeletal: Negative for arthralgias, back pain, gait problem, joint swelling, leg pain, myalgias and neck pain.   Integumentary:  Negative for rash.   Neurological: Negative for dizziness, tremors, seizures, syncope, speech difficulty, weakness, light-headedness, numbness, headaches and memory loss.   Hematological: Negative for adenopathy. Does not bruise/bleed easily.   Psychiatric/Behavioral: Negative for agitation, confusion, hallucinations, sleep disturbance and suicidal ideas. The patient is not nervous/anxious.               Vitals:    05/10/22 1019   BP: (!) 146/80   BP Location: Right arm   Patient Position: Sitting   BP Method: Medium (Automatic)   Pulse: 78   Temp: 98 °F (36.7 °C)   SpO2: 99%   Weight: 63.5 kg (140 lb)   Height: 5' 6" (1.676 m)      Physical Exam  Vitals and nursing note reviewed.   Constitutional:       General: He is not in acute distress.     Appearance: Normal appearance.   HENT:      Head: Normocephalic and atraumatic.      Mouth/Throat:      Mouth: Mucous membranes are moist.   Eyes:      General: No scleral icterus.     Extraocular Movements: Extraocular movements intact.      Conjunctiva/sclera: Conjunctivae normal.   Neck:      Vascular: No JVD.   Cardiovascular:      Rate and Rhythm: Normal rate and regular rhythm.      Heart sounds: No murmur heard.  Pulmonary:      Effort: Pulmonary effort is normal.      Breath sounds: Normal breath sounds. No wheezing or rhonchi.   Chest:      Chest wall: No tenderness.   Breasts:      Right: No axillary adenopathy or supraclavicular adenopathy.      Left: No axillary adenopathy or supraclavicular adenopathy.       Abdominal:      General: Bowel sounds are normal. There is no distension.      Palpations: Abdomen is soft.      Tenderness: There is no abdominal " tenderness.   Musculoskeletal:         General: No swelling or deformity.      Cervical back: Neck supple.   Lymphadenopathy:      Cervical: No cervical adenopathy.      Upper Body:      Right upper body: No supraclavicular or axillary adenopathy.      Left upper body: No supraclavicular or axillary adenopathy.      Lower Body: No right inguinal adenopathy. No left inguinal adenopathy.   Skin:     General: Skin is warm.      Coloration: Skin is not jaundiced.      Findings: No rash.   Neurological:      General: No focal deficit present.      Mental Status: He is alert and oriented to person, place, and time.      Cranial Nerves: Cranial nerves are intact.   Psychiatric:         Attention and Perception: Attention normal.         Mood and Affect: Mood and affect normal.         Behavior: Behavior is cooperative.         Cognition and Memory: Cognition normal.         Judgment: Judgment normal.       ECOG SCORE           Laboratory:  CBC with Differential:  @PUMHBOKOS70(WBC,NEUTROPCT,LYMPH,MONOPCT,EOSPCT,BASOPHIL,RBC,HCT,HGB,MCV,MCH,MCMC,RDW,PLT,MPV)@  CMP:  @AORKKZBYV31(Glu,Calcium,Albumin,PROT,NA,K,CO2,CL,BUN,Creatinine,Alkphos,ALT,AST,Bilitot)@  BMP: @TAKNQXFEC15(GLU,Calcium,NA,K,CO2,CL,BUN,Creatinine)@  LFTs: @HBPHTFECG71(ALT,AST,ALKPHOS,BILITOT,PROT,ALBUMIN)@  Haptoglobin: @ETATZWJFY23(HAPTOGLOBIN)@  Tumor Markers: @NSRBHFRNB11(PSA,CEA,,ALGTM,AFPTM,JY4340,)@  Immunology: @AXMNYJJCR08(SPEP,CYNDIE,AURORA,FREELAMBDALI)@  Coagulation: @YWQURZJRR26(PT,INR,APTT)@  Specimen (24h ago, onward)            None        Microbiology Results (last 7 days)     ** No results found for the last 168 hours. **            Imaging:  CT angiogram 11/20/2021:  PULMONARY ARTERY:No evidence of pulmonary thromboembolism.  AORTA: Normal in course and caliber.  THYROID GLAND: The visualized thyroid is unremarkable.  AIRWAYS: Trachea is midline and tracheobronchial tree is patent.   HEART: The heart is normal in size. There is no  pericardial effusion.   LUNGS: There are no new masses or nodules identified. No pleural effusion or pneumothorax.  LYMPH NODES: Enlarged mediastinal lymph nodes extending into the bilateral vj. On the right largest conglomeration of lymph nodes measures approximately 1.8 x 3.3 cm. This concepcion prominence is seen throughout the mediastinum as well as enlarged lymph node in the left neck measuring 1.3 cm (series 4 image 13).  BONES: No displaced fracture. No aggressive appearing osseous lesion identified.  UPPER ABDOMEN: The visualized abdomen is unremarkable.  Impression  No evidence of pulmonary thromboembolism.      Pulmonary findings have resolved in the interval with mediastinal adenopathy now present. Findings are most likely reactive from recent infectious process, however recommend continued short interval follow-up to resolution to exclude lymphoma.    1. Mediastinal, hilar and lower cervical lymphadenopathy with stable overall appearance since November. However, retrocrural lymph nodes are mildly larger with suspected mild enlargement of a partially visualized concepcion conglomerate between the left kidney and pancreas.  2. Increased number of subcentimeter pulmonary nodules since prior CT including clustered nodules in the posterior right lower lobe. These are likely infectious or inflammatory in nature. 2-3 month follow-up chest CT suggested to confirm resolution.       Assessment:       1. Human immunodeficiency virus (HIV) disease    2. Lymphadenopathy    3. Leukopenia, unspecified type    4. Anemia due to chronic blood loss    5. Localized enlarged lymph nodes      HIV/AIDS  Lymphadenopathy--no palpable lymph nodes. If need biopsy need to be from a larger LN (> 1 cm)  Anemia--stable  Leukopenia--stable  Abdominal pain--resolved  Recent PJP pneumonia, Syphilis and disseminated MAC   Pulmonary nodule--Increase in number but very small--subcm--> Infectious??            Plan:       Patient with history of  HIV/AIDS presented with abdominal pain, fever, and lymphadenopathy.  Recently discharged from the hospital for PJP pneumonia and disseminated MAC.  Is currently on rifampin, ethambutol and clarithromycin.  He is also on ART therapy for HIV that was restarted on 10/11/2021.  He is also on dolutegravir since he started Rifampin.  He completed treatment course of Bactrim for his PJP on 11/8/2021.  Blood cultures negative.  He did have blood cultures positive for MAC during the 10/19/2021 hospitalization.  He is on so on prednisone that he will need to continue for at least 2 more weeks.    Patient lymphadenopathy is relatively acute as this lymphadenopathy was not as prominent back in October of this year.  Most likely related to his disease/infection but lymphoma is on the differential. Patient reluctant to undergo any type of procedure, excisional lymph node biopsy, at this time.  He would like to give time to the medications to work and reevaluate in couple of months.  Most recent CT with resolution of pulmonary nodules and significant improvement of his lymphadenopathy.  Again this most likely secondary to his HIV rather than to lymphoma or any other malignancy.  We will continue to follow for now.    If LAD worsen he will need Excisional LN biopsy to r/o AIDS related lymphoma  Cont ART Rx as per ID service  RTC in 4 months with labs and after CT's  Encouraged to call for any questions or problems.  The patient was given ample opportunity to ask questions and they were all answered to satisfaction; patient demonstrated understanding of what we discussed and is agreeable to the plan.        AYANA QUARLES MD

## 2022-05-10 ENCOUNTER — OFFICE VISIT (OUTPATIENT)
Dept: HEMATOLOGY/ONCOLOGY | Facility: CLINIC | Age: 32
End: 2022-05-10
Payer: MEDICAID

## 2022-05-10 VITALS
HEART RATE: 78 BPM | OXYGEN SATURATION: 99 % | WEIGHT: 140 LBS | SYSTOLIC BLOOD PRESSURE: 146 MMHG | DIASTOLIC BLOOD PRESSURE: 80 MMHG | TEMPERATURE: 98 F | BODY MASS INDEX: 22.5 KG/M2 | HEIGHT: 66 IN

## 2022-05-10 DIAGNOSIS — D72.819 LEUKOPENIA, UNSPECIFIED TYPE: ICD-10-CM

## 2022-05-10 DIAGNOSIS — B20 HUMAN IMMUNODEFICIENCY VIRUS (HIV) DISEASE: ICD-10-CM

## 2022-05-10 DIAGNOSIS — R59.1 LYMPHADENOPATHY: ICD-10-CM

## 2022-05-10 DIAGNOSIS — R59.0 LOCALIZED ENLARGED LYMPH NODES: ICD-10-CM

## 2022-05-10 DIAGNOSIS — D50.0 ANEMIA DUE TO CHRONIC BLOOD LOSS: ICD-10-CM

## 2022-05-10 PROCEDURE — 1159F PR MEDICATION LIST DOCUMENTED IN MEDICAL RECORD: ICD-10-PCS | Mod: CPTII,,, | Performed by: INTERNAL MEDICINE

## 2022-05-10 PROCEDURE — 3077F SYST BP >= 140 MM HG: CPT | Mod: CPTII,,, | Performed by: INTERNAL MEDICINE

## 2022-05-10 PROCEDURE — 3077F PR MOST RECENT SYSTOLIC BLOOD PRESSURE >= 140 MM HG: ICD-10-PCS | Mod: CPTII,,, | Performed by: INTERNAL MEDICINE

## 2022-05-10 PROCEDURE — 3079F DIAST BP 80-89 MM HG: CPT | Mod: CPTII,,, | Performed by: INTERNAL MEDICINE

## 2022-05-10 PROCEDURE — 1159F MED LIST DOCD IN RCRD: CPT | Mod: CPTII,,, | Performed by: INTERNAL MEDICINE

## 2022-05-10 PROCEDURE — 99999 PR PBB SHADOW E&M-EST. PATIENT-LVL IV: CPT | Mod: PBBFAC,,, | Performed by: INTERNAL MEDICINE

## 2022-05-10 PROCEDURE — 99214 OFFICE O/P EST MOD 30 MIN: CPT | Mod: PBBFAC | Performed by: INTERNAL MEDICINE

## 2022-05-10 PROCEDURE — 3008F BODY MASS INDEX DOCD: CPT | Mod: CPTII,,, | Performed by: INTERNAL MEDICINE

## 2022-05-10 PROCEDURE — 1160F RVW MEDS BY RX/DR IN RCRD: CPT | Mod: CPTII,,, | Performed by: INTERNAL MEDICINE

## 2022-05-10 PROCEDURE — 99214 OFFICE O/P EST MOD 30 MIN: CPT | Mod: S$PBB,,, | Performed by: INTERNAL MEDICINE

## 2022-05-10 PROCEDURE — 99214 PR OFFICE/OUTPT VISIT, EST, LEVL IV, 30-39 MIN: ICD-10-PCS | Mod: S$PBB,,, | Performed by: INTERNAL MEDICINE

## 2022-05-10 PROCEDURE — 3079F PR MOST RECENT DIASTOLIC BLOOD PRESSURE 80-89 MM HG: ICD-10-PCS | Mod: CPTII,,, | Performed by: INTERNAL MEDICINE

## 2022-05-10 PROCEDURE — 99999 PR PBB SHADOW E&M-EST. PATIENT-LVL IV: ICD-10-PCS | Mod: PBBFAC,,, | Performed by: INTERNAL MEDICINE

## 2022-05-10 PROCEDURE — 3008F PR BODY MASS INDEX (BMI) DOCUMENTED: ICD-10-PCS | Mod: CPTII,,, | Performed by: INTERNAL MEDICINE

## 2022-05-10 PROCEDURE — 1160F PR REVIEW ALL MEDS BY PRESCRIBER/CLIN PHARMACIST DOCUMENTED: ICD-10-PCS | Mod: CPTII,,, | Performed by: INTERNAL MEDICINE

## 2022-05-10 RX ORDER — SULFAMETHOXAZOLE AND TRIMETHOPRIM 800; 160 MG/1; MG/1
1 TABLET ORAL DAILY
COMMUNITY
End: 2022-07-26 | Stop reason: SDUPTHER

## 2022-05-16 ENCOUNTER — TELEPHONE (OUTPATIENT)
Dept: INFECTIOUS DISEASES | Facility: CLINIC | Age: 32
End: 2022-05-16
Payer: MEDICAID

## 2022-05-16 NOTE — TELEPHONE ENCOUNTER
----- Message from Ping Weekly sent at 5/16/2022 10:27 AM CDT -----  Regarding: SCC ID:  Pt/Mom called for lab results  RICARDA PT    Pt /Mom left a message on the answering machine.  Pt would like lab results.  Pt can be reached at [phone]

## 2022-05-26 ENCOUNTER — TELEPHONE (OUTPATIENT)
Dept: INFECTIOUS DISEASES | Facility: CLINIC | Age: 32
End: 2022-05-26
Payer: MEDICAID

## 2022-05-26 NOTE — TELEPHONE ENCOUNTER
Insurance claim form completed and signed by Franca Link NP. Called and spoke with patient to inform him that it is ready for . Patient voiced understanding and will be by today to pick it up.

## 2022-06-10 ENCOUNTER — TELEPHONE (OUTPATIENT)
Dept: INFECTIOUS DISEASES | Facility: CLINIC | Age: 32
End: 2022-06-10
Payer: MEDICAID

## 2022-06-10 NOTE — TELEPHONE ENCOUNTER
----- Message from Ping Weekly sent at 6/10/2022 12:05 PM CDT -----  Regarding: SCC ID:  Pt r/s from 6/16/22  RICARDA PT      Pt called today to r/s from 6/16/22 appt for RTC 6 wk B20 f/u w Ricarda and was r/s on July 26 which was the next available appt.

## 2022-06-13 ENCOUNTER — OFFICE VISIT (OUTPATIENT)
Dept: INTERNAL MEDICINE | Facility: CLINIC | Age: 32
End: 2022-06-13
Payer: MEDICAID

## 2022-06-13 ENCOUNTER — LAB VISIT (OUTPATIENT)
Dept: LAB | Facility: HOSPITAL | Age: 32
End: 2022-06-13
Attending: NURSE PRACTITIONER
Payer: MEDICAID

## 2022-06-13 VITALS
HEART RATE: 87 BPM | DIASTOLIC BLOOD PRESSURE: 77 MMHG | TEMPERATURE: 99 F | HEIGHT: 66 IN | WEIGHT: 144 LBS | SYSTOLIC BLOOD PRESSURE: 132 MMHG | RESPIRATION RATE: 16 BRPM | BODY MASS INDEX: 23.14 KG/M2

## 2022-06-13 DIAGNOSIS — Z00.00 WELLNESS EXAMINATION: Primary | ICD-10-CM

## 2022-06-13 DIAGNOSIS — R73.01 IMPAIRED FASTING GLUCOSE: ICD-10-CM

## 2022-06-13 DIAGNOSIS — G47.33 OSA (OBSTRUCTIVE SLEEP APNEA): ICD-10-CM

## 2022-06-13 DIAGNOSIS — Z00.00 WELLNESS EXAMINATION: ICD-10-CM

## 2022-06-13 LAB
ALBUMIN SERPL-MCNC: 4.1 GM/DL (ref 3.5–5)
ALBUMIN/GLOB SERPL: 1.1 RATIO (ref 1.1–2)
ALP SERPL-CCNC: 108 UNIT/L (ref 40–150)
ALT SERPL-CCNC: 21 UNIT/L (ref 0–55)
APPEARANCE UR: CLEAR
AST SERPL-CCNC: 36 UNIT/L (ref 5–34)
BACTERIA #/AREA URNS AUTO: ABNORMAL /HPF
BASOPHILS # BLD AUTO: 0.06 X10(3)/MCL (ref 0–0.2)
BASOPHILS NFR BLD AUTO: 1.7 %
BILIRUB UR QL STRIP.AUTO: NEGATIVE MG/DL
BILIRUBIN DIRECT+TOT PNL SERPL-MCNC: 0.3 MG/DL
BUN SERPL-MCNC: 7.9 MG/DL (ref 8.9–20.6)
CALCIUM SERPL-MCNC: 9.4 MG/DL (ref 8.4–10.2)
CHLORIDE SERPL-SCNC: 107 MMOL/L (ref 98–107)
CHOLEST SERPL-MCNC: 178 MG/DL
CHOLEST/HDLC SERPL: 4 {RATIO} (ref 0–5)
CO2 SERPL-SCNC: 23 MMOL/L (ref 22–29)
COLOR UR AUTO: YELLOW
CREAT SERPL-MCNC: 1.25 MG/DL (ref 0.73–1.18)
EOSINOPHIL # BLD AUTO: 0.35 X10(3)/MCL (ref 0–0.9)
EOSINOPHIL NFR BLD AUTO: 10.1 %
ERYTHROCYTE [DISTWIDTH] IN BLOOD BY AUTOMATED COUNT: 12.3 % (ref 11.5–17)
EST. AVERAGE GLUCOSE BLD GHB EST-MCNC: 108.3 MG/DL
GLOBULIN SER-MCNC: 3.8 GM/DL (ref 2.4–3.5)
GLUCOSE SERPL-MCNC: 104 MG/DL (ref 74–100)
GLUCOSE UR QL STRIP.AUTO: NORMAL MG/DL
HBA1C MFR BLD: 5.4 %
HCT VFR BLD AUTO: 40.2 % (ref 42–52)
HDLC SERPL-MCNC: 42 MG/DL (ref 35–60)
HGB BLD-MCNC: 13.7 GM/DL (ref 14–18)
HYALINE CASTS #/AREA URNS LPF: ABNORMAL /LPF
IMM GRANULOCYTES # BLD AUTO: 0.01 X10(3)/MCL (ref 0–0.02)
IMM GRANULOCYTES NFR BLD AUTO: 0.3 % (ref 0–0.43)
KETONES UR QL STRIP.AUTO: NEGATIVE MG/DL
LDLC SERPL CALC-MCNC: 115 MG/DL (ref 50–140)
LEUKOCYTE ESTERASE UR QL STRIP.AUTO: NEGATIVE UNIT/L
LYMPHOCYTES # BLD AUTO: 1.68 X10(3)/MCL (ref 0.6–4.6)
LYMPHOCYTES NFR BLD AUTO: 48.4 %
MCH RBC QN AUTO: 30.2 PG (ref 27–31)
MCHC RBC AUTO-ENTMCNC: 34.1 MG/DL (ref 33–36)
MCV RBC AUTO: 88.5 FL (ref 80–94)
MONOCYTES # BLD AUTO: 0.35 X10(3)/MCL (ref 0.1–1.3)
MONOCYTES NFR BLD AUTO: 10.1 %
MUCOUS THREADS URNS QL MICRO: ABNORMAL /LPF
NEUTROPHILS # BLD AUTO: 1 X10(3)/MCL (ref 2.1–9.2)
NEUTROPHILS NFR BLD AUTO: 29.4 %
NITRITE UR QL STRIP.AUTO: NEGATIVE
NRBC BLD AUTO-RTO: 0 %
PH UR STRIP.AUTO: 5.5 [PH]
PLATELET # BLD AUTO: 263 X10(3)/MCL (ref 130–400)
PMV BLD AUTO: 9 FL (ref 9.4–12.4)
POTASSIUM SERPL-SCNC: 4.2 MMOL/L (ref 3.5–5.1)
PROT SERPL-MCNC: 7.9 GM/DL (ref 6.4–8.3)
PROT UR QL STRIP.AUTO: ABNORMAL MG/DL
RBC # BLD AUTO: 4.54 X10(6)/MCL (ref 4.7–6.1)
RBC #/AREA URNS AUTO: ABNORMAL /HPF
RBC UR QL AUTO: NEGATIVE UNIT/L
SODIUM SERPL-SCNC: 139 MMOL/L (ref 136–145)
SP GR UR STRIP.AUTO: 1.03
SQUAMOUS #/AREA URNS LPF: ABNORMAL /HPF
T4 FREE SERPL-MCNC: 0.84 NG/DL (ref 0.7–1.48)
TRIGL SERPL-MCNC: 106 MG/DL (ref 34–140)
TSH SERPL-ACNC: 6.16 UIU/ML (ref 0.35–4.94)
UROBILINOGEN UR STRIP-ACNC: NORMAL MG/DL
VLDLC SERPL CALC-MCNC: 21 MG/DL
WBC # SPEC AUTO: 3.5 X10(3)/MCL (ref 4.5–11.5)
WBC #/AREA URNS AUTO: ABNORMAL /HPF

## 2022-06-13 PROCEDURE — 80061 LIPID PANEL: CPT

## 2022-06-13 PROCEDURE — 1159F MED LIST DOCD IN RCRD: CPT | Mod: CPTII,,, | Performed by: NURSE PRACTITIONER

## 2022-06-13 PROCEDURE — 3078F DIAST BP <80 MM HG: CPT | Mod: CPTII,,, | Performed by: NURSE PRACTITIONER

## 2022-06-13 PROCEDURE — 99214 OFFICE O/P EST MOD 30 MIN: CPT | Mod: S$PBB,,, | Performed by: NURSE PRACTITIONER

## 2022-06-13 PROCEDURE — 81001 URINALYSIS AUTO W/SCOPE: CPT

## 2022-06-13 PROCEDURE — 84443 ASSAY THYROID STIM HORMONE: CPT

## 2022-06-13 PROCEDURE — 1159F PR MEDICATION LIST DOCUMENTED IN MEDICAL RECORD: ICD-10-PCS | Mod: CPTII,,, | Performed by: NURSE PRACTITIONER

## 2022-06-13 PROCEDURE — 99215 OFFICE O/P EST HI 40 MIN: CPT | Mod: PBBFAC | Performed by: NURSE PRACTITIONER

## 2022-06-13 PROCEDURE — 36415 COLL VENOUS BLD VENIPUNCTURE: CPT

## 2022-06-13 PROCEDURE — 1160F RVW MEDS BY RX/DR IN RCRD: CPT | Mod: CPTII,,, | Performed by: NURSE PRACTITIONER

## 2022-06-13 PROCEDURE — 3008F PR BODY MASS INDEX (BMI) DOCUMENTED: ICD-10-PCS | Mod: CPTII,,, | Performed by: NURSE PRACTITIONER

## 2022-06-13 PROCEDURE — 3008F BODY MASS INDEX DOCD: CPT | Mod: CPTII,,, | Performed by: NURSE PRACTITIONER

## 2022-06-13 PROCEDURE — 3075F PR MOST RECENT SYSTOLIC BLOOD PRESS GE 130-139MM HG: ICD-10-PCS | Mod: CPTII,,, | Performed by: NURSE PRACTITIONER

## 2022-06-13 PROCEDURE — 85025 COMPLETE CBC W/AUTO DIFF WBC: CPT

## 2022-06-13 PROCEDURE — 83036 HEMOGLOBIN GLYCOSYLATED A1C: CPT

## 2022-06-13 PROCEDURE — 80053 COMPREHEN METABOLIC PANEL: CPT

## 2022-06-13 PROCEDURE — 84439 ASSAY OF FREE THYROXINE: CPT

## 2022-06-13 PROCEDURE — 99214 PR OFFICE/OUTPT VISIT, EST, LEVL IV, 30-39 MIN: ICD-10-PCS | Mod: S$PBB,,, | Performed by: NURSE PRACTITIONER

## 2022-06-13 PROCEDURE — 1160F PR REVIEW ALL MEDS BY PRESCRIBER/CLIN PHARMACIST DOCUMENTED: ICD-10-PCS | Mod: CPTII,,, | Performed by: NURSE PRACTITIONER

## 2022-06-13 PROCEDURE — 3078F PR MOST RECENT DIASTOLIC BLOOD PRESSURE < 80 MM HG: ICD-10-PCS | Mod: CPTII,,, | Performed by: NURSE PRACTITIONER

## 2022-06-13 PROCEDURE — 3075F SYST BP GE 130 - 139MM HG: CPT | Mod: CPTII,,, | Performed by: NURSE PRACTITIONER

## 2022-06-13 NOTE — PROGRESS NOTES
KASIA Kirkpatrick   [facility]  [address] [id number]      PATIENT NAME: Azael Hodge  : 1990  DATE: 22  MRN: [id number]      Billing Provider: KASIA Kirkpatrick  Level of Service:   Patient PCP Information     Provider PCP Type    KASIA Kirkpatrick General          Reason for Visit / Chief Complaint: Follow-up       History of Present Illness / Problem Focused Workflow     Azael Hodge presents to the clinic with Follow-up     32 yo AAM here to establish care. PMH HIV. Pt reports family hx of HTN and diabetes; pt is agreeable to wellness lab work today and f/u next week for eval. He reports with concerns regarding CAITLIN, the pt reports hx of witnessed apnea, snoring, EDS, and trouble falling asleep at times. [place] Sleepiness Scale score of 10. Will send referral to [facility] Sleep lab for further evaluation. All Screenings UTD. Pt has no other concerns at this time.       Review of Systems     Review of Systems   Constitutional: Negative for activity change and unexpected weight change.   HENT: Negative for hearing loss, rhinorrhea and trouble swallowing.    Eyes: Negative for discharge and visual disturbance.   Respiratory: Negative for chest tightness and wheezing.    Cardiovascular: Negative for chest pain and palpitations.   Gastrointestinal: Negative for blood in stool, constipation, diarrhea and vomiting.   Endocrine: Negative for polydipsia and polyuria.   Genitourinary: Negative for difficulty urinating, hematuria and urgency.   Musculoskeletal: Negative for arthralgias, joint swelling and neck pain.   Neurological: Negative for weakness and headaches.   Psychiatric/Behavioral: Negative for confusion and dysphoric mood.       Medical / Social / Family History     Past Medical History:   Diagnosis Date    ADHD (attention deficit hyperactivity disorder)     Ages 6 through 13.  Out grew it    AIDS due to HIV-I      Pneumonia, unspecified organism     Syphilis, unspecified        History reviewed. No pertinent surgical history.    Social History    reports that he has never smoked. He has never used smokeless tobacco. He reports that he does not drink alcohol and does not use drugs.    Family History  's family history includes Anxiety disorder in his father; Diabetes in his brother; Hypertension in his brother, father, and mother; Migraines in his father.    Medications and Allergies     Medications  Medication List with Changes/Refills   Current Medications    CLARITHROMYCIN (BIAXIN) 500 MG TABLET    Take 500 mg by mouth 2 (two) times a day.    DOLUTEGRAVIR (TIVICAY) 50 MG TAB    Take 50 mg by mouth once daily.    ERGOCALCIFEROL (ERGOCALCIFEROL) 50,000 UNIT CAP    Take 50,000 Int'l Units by mouth once a week.    ETHAMBUTOL (MYAMBUTOL) 400 MG TAB    Take 800 mg by mouth once daily.    RIFAMPIN (RIFADIN) 300 MG CAPSULE    Take 300 mg by mouth 2 (two) times a day.    SULFAMETHOXAZOLE-TRIMETHOPRIM 800-160MG (BACTRIM DS) 800-160 MG TAB    Take 1 tablet by mouth once daily.    TRIUMEQ 600- MG TAB    Take 1 tablet by mouth once daily.        Allergies  Review of patient's allergies indicates:   Allergen Reactions    Banana      Other reaction(s): Pharyngeal edema    Benzodiazepines Hallucinations       Physical Examination     Vitals:    06/13/22 0810   BP: 132/77   Pulse: 87   Resp: 16   Temp: 98.9 °F (37.2 °C)     Physical Exam  Vitals reviewed.   Constitutional:       Appearance: Normal appearance. He is normal weight.   HENT:      Head: Normocephalic.   Cardiovascular:      Rate and Rhythm: Normal rate and regular rhythm.      Pulses: Normal pulses.      Heart sounds: Normal heart sounds.   Pulmonary:      Effort: Pulmonary effort is normal.      Breath sounds: Normal breath sounds.   Abdominal:      General: Abdomen is flat.      Palpations: Abdomen is soft.   Musculoskeletal:         General: Normal range of  motion.      Cervical back: Normal range of motion.   Skin:     General: Skin is warm and dry.   Neurological:      Mental Status: He is alert.   Psychiatric:         Mood and Affect: Mood normal.           Results         Assessment and Plan (including Health Maintenance)     Plan:   [place] SLEEPINESS SCALE 6/13/2022   Sitting and reading 2   Watching TV 2   Sitting, inactive in a public place (e.g. a theatre or a meeting) 0   As a passenger in a car for an hour without a break 1   Lying down to rest in the afternoon when circumstances permit 3   Sitting and talking to someone 0   Sitting quietly after a lunch without alcohol 2   In a car, while stopped for a few minutes in traffic 0   Total score 10               Health Maintenance Due   Topic Date Due    COVID-19 Vaccine (1) Never done    Pneumococcal Vaccines (Age 0-64) (3 - PPSV23 or PCV20) 08/02/2021       Problem List Items Addressed This Visit        Endocrine    Impaired fasting glucose    Current Assessment & Plan     A1C Today            Relevant Orders    Comprehensive Metabolic Panel    Hemoglobin       Other    CAITLIN (obstructive sleep apnea)    Current Assessment & Plan     Referral to Sleep Lab Today  [place] Sleepiness Scale 10            Relevant Orders    Ambulatory referral/consult to Sleep Disorders      Other Visit Diagnoses     Wellness examination    -  Primary    Relevant Orders    Hemoglobin A1C    CBC Auto Differential    Comprehensive Metabolic Panel    Lipid Panel    TSH    T4, Free    Hemoglobin    Urinalysis, Reflex to Urine Culture Urine, Clean Catch          Health Maintenance Topics with due status: Not Due       Topic Last Completion Date    TETANUS VACCINE 08/02/2016    Influenza Vaccine 11/04/2019       Future Appointments   Date Time Provider Department Center   6/29/2022  1:15 PM PROVIDERS, ALMAZ [facility]   7/26/2022  7:30 AM KASIA Staley   11/15/2022 10:00 AM EAN MENA  [facility]   11/15/2022 10:20 AM MD DASH EncarnacionB [facility]            Signature:     [facility]  [address] [id number]    Date of encounter: 6/13/22

## 2022-06-21 DIAGNOSIS — G47.33 OSA (OBSTRUCTIVE SLEEP APNEA): Primary | ICD-10-CM

## 2022-06-22 ENCOUNTER — PROCEDURE VISIT (OUTPATIENT)
Dept: SLEEP MEDICINE | Facility: HOSPITAL | Age: 32
End: 2022-06-22
Attending: NURSE PRACTITIONER
Payer: MEDICAID

## 2022-06-22 DIAGNOSIS — G47.33 OSA (OBSTRUCTIVE SLEEP APNEA): ICD-10-CM

## 2022-06-22 PROCEDURE — 95810 POLYSOM 6/> YRS 4/> PARAM: CPT

## 2022-06-23 ENCOUNTER — TELEPHONE (OUTPATIENT)
Dept: INFECTIOUS DISEASES | Facility: CLINIC | Age: 32
End: 2022-06-23
Payer: MEDICAID

## 2022-06-23 NOTE — TELEPHONE ENCOUNTER
Patient came by clinic stating that he is need of assistance. He gave verbal consent to send referral to [facility] and [place]. Referrals sent.

## 2022-06-27 ENCOUNTER — TELEPHONE (OUTPATIENT)
Dept: INFECTIOUS DISEASES | Facility: CLINIC | Age: 32
End: 2022-06-27
Payer: MEDICAID

## 2022-06-27 NOTE — TELEPHONE ENCOUNTER
Franca signed claim paperwork that patient dropped off. Attempted to contact patient to let him know that it is completed. No answer. Voicemail left to call clinic back.

## 2022-06-28 ENCOUNTER — OFFICE VISIT (OUTPATIENT)
Dept: INTERNAL MEDICINE | Facility: CLINIC | Age: 32
End: 2022-06-28
Payer: MEDICAID

## 2022-06-28 DIAGNOSIS — E03.8 SUBCLINICAL HYPOTHYROIDISM: ICD-10-CM

## 2022-06-28 DIAGNOSIS — R73.01 IMPAIRED FASTING GLUCOSE: Primary | ICD-10-CM

## 2022-06-28 PROCEDURE — 1159F PR MEDICATION LIST DOCUMENTED IN MEDICAL RECORD: ICD-10-PCS | Mod: CPTII,95,, | Performed by: NURSE PRACTITIONER

## 2022-06-28 PROCEDURE — 1160F PR REVIEW ALL MEDS BY PRESCRIBER/CLIN PHARMACIST DOCUMENTED: ICD-10-PCS | Mod: CPTII,95,, | Performed by: NURSE PRACTITIONER

## 2022-06-28 PROCEDURE — 1159F MED LIST DOCD IN RCRD: CPT | Mod: CPTII,95,, | Performed by: NURSE PRACTITIONER

## 2022-06-28 PROCEDURE — 99213 PR OFFICE/OUTPT VISIT, EST, LEVL III, 20-29 MIN: ICD-10-PCS | Mod: 95,,, | Performed by: NURSE PRACTITIONER

## 2022-06-28 PROCEDURE — 1160F RVW MEDS BY RX/DR IN RCRD: CPT | Mod: CPTII,95,, | Performed by: NURSE PRACTITIONER

## 2022-06-28 PROCEDURE — 99213 OFFICE O/P EST LOW 20 MIN: CPT | Mod: 95,,, | Performed by: NURSE PRACTITIONER

## 2022-06-28 NOTE — ASSESSMENT & PLAN NOTE
Follow ADA diet.  Avoid soda, simple sweets, and limit rice/pasta/bread/starches and consume brown options when possible.   Maintain healthy weight with BMI goal <30.   Perform aerobic exercise for 150 minutes per week (or 5 days a week for 30 minutes each day).

## 2022-06-28 NOTE — PROGRESS NOTES
KASIA Kirkpatrick   [facility]  [address] [facility] [address]      PATIENT NAME: Azael Hodge  : 1990  DATE: 22  MRN: [id number]      Billing Provider: KASIA Kirkpatrick  Level of Service:   Patient PCP Information     Provider PCP Type    KASIA Kirkpatrick General          Reason for Visit / Chief Complaint: Follow-up (Lab review)       History of Present Illness / Problem Focused Workflow     Azael Hodge presents to the clinic with Follow-up (Lab review)     30 yo AAM here today for f/u via audio virtual for lab review. PMH HIV. Followed by Oncology / Hematology Dr. Cage due to recent hx of lymphadenopathy with leukocytosis, the most recent OV was 2022 with recs for a 6 month f/u, they were / are concerned about AIDS related lymphoma and will be performing repeat testing in November. Pt's labs today are WNL for our purposes, we did discuss subclinical  hypothyroidism with elevated TSH and normal T4 levels, we will repeat this in 6 months to eval but we both agree to monitor due to current pt medical situation. Pt denies any other concerns or questions today, is awaiting sleep lab appt, is aware of all other upcoming OV's.       Review of Systems     Review of Systems   Constitutional: Negative.    HENT: Negative.    Eyes: Negative.    Respiratory: Negative.    Cardiovascular: Negative.    Gastrointestinal: Negative.    Endocrine: Negative.    Genitourinary: Negative.    Neurological: Negative.    Psychiatric/Behavioral: Negative.        Medical / Social / Family History     Past Medical History:   Diagnosis Date    ADHD (attention deficit hyperactivity disorder)     Ages 6 through 13.  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    Social History    reports that he has never smoked. He has never used smokeless tobacco. He  reports that he does not drink alcohol and does not use drugs.    Family History  's family history includes Anxiety disorder in his father; Diabetes in his brother; Hypertension in his brother, father, and mother; Migraines in his father.    Medications and Allergies     Medications  Medication List with Changes/Refills   Current Medications    CLARITHROMYCIN (BIAXIN) 500 MG TABLET    Take 500 mg by mouth 2 (two) times a day.    DOLUTEGRAVIR (TIVICAY) 50 MG TAB    Take 50 mg by mouth once daily.    ERGOCALCIFEROL (ERGOCALCIFEROL) 50,000 UNIT CAP    Take 50,000 Int'l Units by mouth once a week.    ETHAMBUTOL (MYAMBUTOL) 400 MG TAB    Take 800 mg by mouth once daily.    RIFAMPIN (RIFADIN) 300 MG CAPSULE    Take 300 mg by mouth 2 (two) times a day.    SULFAMETHOXAZOLE-TRIMETHOPRIM 800-160MG (BACTRIM DS) 800-160 MG TAB    Take 1 tablet by mouth once daily.    TRIUMEQ 600- MG TAB    Take 1 tablet by mouth once daily.        Allergies  Review of patient's allergies indicates:   Allergen Reactions    Banana      Other reaction(s): Pharyngeal edema    Benzodiazepines Hallucinations       Physical Examination   There were no vitals filed for this visit.  Physical Exam  HENT:      Right Ear: Hearing normal.      Left Ear: Hearing normal.   Neurological:      Mental Status: He is alert and oriented to person, place, and time.   Psychiatric:         Mood and Affect: Mood normal.           Results     Lab Results   Component Value Date    WBC 3.5 (L) 06/13/2022    RBC 4.54 (L) 06/13/2022    HGB 13.7 (L) 06/13/2022    HCT 40.2 (L) 06/13/2022    MCV 88.5 06/13/2022    MCH 30.2 06/13/2022    MCHC 34.1 06/13/2022    RDW 12.3 06/13/2022     06/13/2022    MPV 9.0 (L) 06/13/2022    GRAN 2.3 02/27/2015    GRAN 52.2 02/27/2015    LYMPH 1.7 02/27/2015    LYMPH 38.9 02/27/2015    MONO 0.3 02/27/2015    MONO 7.3 02/27/2015    EOS 0.0 02/27/2015    BASO 0.02 02/27/2015    EOSINOPHIL 0.9 02/27/2015    BASOPHIL 0.5  02/27/2015     CMP  Sodium   Date Value Ref Range Status   03/14/2014 138 136 - 145 mmol/L Final     Sodium Level   Date Value Ref Range Status   06/13/2022 139 136 - 145 mmol/L Final     Potassium   Date Value Ref Range Status   03/14/2014 4.7 3.5 - 5.1 mmol/L Final     Comment:     *Slightly Hemolyzed     Potassium Level   Date Value Ref Range Status   06/13/2022 4.2 3.5 - 5.1 mmol/L Final     Chloride   Date Value Ref Range Status   03/14/2014 103 95 - 110 mmol/L Final     CO2   Date Value Ref Range Status   03/14/2014 24 23 - 29 mmol/L Final     Carbon Dioxide   Date Value Ref Range Status   06/13/2022 23 22 - 29 mmol/L Final     Glucose   Date Value Ref Range Status   03/14/2014 91 70 - 110 mg/dL Final     BUN   Date Value Ref Range Status   03/14/2014 11 6 - 20 mg/dL Final     Blood Urea Nitrogen   Date Value Ref Range Status   06/13/2022 7.9 (L) 8.9 - 20.6 mg/dL Final     Creatinine   Date Value Ref Range Status   06/13/2022 1.25 (H) 0.73 - 1.18 mg/dL Final   03/14/2014 1.2 0.5 - 1.4 mg/dL Final     Calcium   Date Value Ref Range Status   03/14/2014 9.6 8.7 - 10.5 mg/dL Final     Calcium Level Total   Date Value Ref Range Status   06/13/2022 9.4 8.4 - 10.2 mg/dL Final     Total Protein   Date Value Ref Range Status   03/14/2014 8.4 6.0 - 8.4 g/dL Final     Albumin   Date Value Ref Range Status   03/14/2014 4.7 3.5 - 5.2 g/dL Final     Albumin Level   Date Value Ref Range Status   06/13/2022 4.1 3.5 - 5.0 gm/dL Final     Total Bilirubin   Date Value Ref Range Status   03/14/2014 1.5 (H) 0.1 - 1.0 mg/dL Final     Comment:     For infants and newborns, interpretation of results should be based  on gestational age, weight and in agreement with clinical  observations.  Premature Infant recommended reference ranges:  Up to 24 hours.............<8.0 mg/dL  Up to 48 hours............<12.0 mg/dL  3-5 days..................<15.0 mg/dL  6-29 days.................<15.0 mg/dL     Bilirubin Total   Date Value Ref Range  Status   06/13/2022 0.3 <=1.5 mg/dL Final     Alkaline Phosphatase   Date Value Ref Range Status   06/13/2022 108 40 - 150 unit/L Final   03/14/2014 79 55 - 135 U/L Final     AST   Date Value Ref Range Status   03/14/2014 29 10 - 40 U/L Final     Aspartate Aminotransferase   Date Value Ref Range Status   06/13/2022 36 (H) 5 - 34 unit/L Final     ALT   Date Value Ref Range Status   03/14/2014 17 10 - 44 U/L Final     Alanine Aminotransferase   Date Value Ref Range Status   06/13/2022 21 0 - 55 unit/L Final     Anion Gap   Date Value Ref Range Status   03/14/2014 11 8 - 16 mmol/L Final     eGFR if    Date Value Ref Range Status   03/14/2014 >60.0 >60 mL/min/1.73 m^2 Final     eGFR if non    Date Value Ref Range Status   03/14/2014 >60.0 >60 mL/min/1.73 m^2 Final     Comment:     Calculation used to obtain the estimated glomerular filtration  rate (eGFR) is the CKD-EPI equation. Since race is unknown   in our information system, the eGFR values for   -American and Non--American patients are given   for each creatinine result.     Estimated GFR-Non    Date Value Ref Range Status   04/25/2022 >60       Lab Results   Component Value Date    CHOL 178 06/13/2022     Lab Results   Component Value Date    HDL 42 06/13/2022     Lab Results   Component Value Date    LDLCALC 47.8 (L) 03/14/2014     Lab Results   Component Value Date    TRIG 106 06/13/2022     Lab Results   Component Value Date    CHOLHDL 53.4 (H) 03/14/2014     Lab Results   Component Value Date    TSH 6.1558 (H) 06/13/2022     Lab Results   Component Value Date    PHUR 6.5 12/22/2021    SPECGRAV 1.020 03/14/2014    PROTEINUA Trace (A) 06/13/2022    GLUCUA Negative 12/22/2021    KETONESU Negative 12/22/2021    OCCULTUA Negative 12/22/2021    NITRITE Negative 12/22/2021    LEUKOCYTESUR Negative 06/13/2022           Assessment and Plan (including Health Maintenance)     Plan:         Health Maintenance  Due   Topic Date Due    COVID-19 Vaccine (1) Never done    Pneumococcal Vaccines (Age 0-64) (3 - PPSV23 or PCV20) 08/02/2021       Problem List Items Addressed This Visit        Endocrine    Impaired fasting glucose - Primary      Other Visit Diagnoses     Hypothyroidism, unspecified type        Subclinical hypothyroidism        Relevant Orders    TSH    T4, Free          Health Maintenance Topics with due status: Not Due       Topic Last Completion Date    TETANUS VACCINE 08/02/2016    Influenza Vaccine 11/04/2019       Future Appointments   Date Time Provider Department Center   6/28/2022  1:15 PM KASIA Kirkpatrick [facility]    6/29/2022  1:15 PM PROVIDERS, [facility]    7/26/2022  7:30 AM KASIA Staley [facility]    [facility]   11/15/2022 10:20 AM [facility]            Signature:     [facility]  [address] [facility] [address]    Date of encounter: 6/28/22    Established Patient - Audio Only Telehealth Visit     The patient location is: home  The chief complaint leading to consultation is: lab review  Visit type: Virtual visit with audio only (telephone)  Total time spent with patient: 15 mins       The reason for the audio only service rather than synchronous audio and video virtual visit was related to technical difficulties or patient preference/necessity.     Each patient to whom I provide medical services by telemedicine is:  (1) informed of the relationship between the physician and patient and the respective role of any other health care provider with respect to management of the patient; and (2) notified that they may decline to receive medical services by telemedicine and may withdraw from such care at any time. Patient verbally consented to receive this service via voice-only telephone call.       This  service was not originating from a related E/M service provided within the previous 7 days nor will  to an E/M service or procedure within the next 24 hours or my soonest available appointment.  Prevailing standard of care was able to be met in this audio-only visit.

## 2022-06-30 ENCOUNTER — PATIENT MESSAGE (OUTPATIENT)
Dept: INFECTIOUS DISEASES | Facility: CLINIC | Age: 32
End: 2022-06-30
Payer: MEDICAID

## 2022-06-30 NOTE — TELEPHONE ENCOUNTER
Called and spoke with patient to inform him that his paperwork is completed and can be . Patient had connection issues with his phone and he was hard to understand. Message sent via the portal to patient to inform him that paperwork is completed.

## 2022-07-11 ENCOUNTER — PATIENT MESSAGE (OUTPATIENT)
Dept: INFECTIOUS DISEASES | Facility: CLINIC | Age: 32
End: 2022-07-11
Payer: MEDICAID

## 2022-07-14 ENCOUNTER — PATIENT MESSAGE (OUTPATIENT)
Dept: INFECTIOUS DISEASES | Facility: CLINIC | Age: 32
End: 2022-07-14
Payer: MEDICAID

## 2022-07-26 ENCOUNTER — OFFICE VISIT (OUTPATIENT)
Dept: INFECTIOUS DISEASES | Facility: CLINIC | Age: 32
End: 2022-07-26
Payer: MEDICAID

## 2022-07-26 ENCOUNTER — TELEPHONE (OUTPATIENT)
Dept: INFECTIOUS DISEASES | Facility: CLINIC | Age: 32
End: 2022-07-26

## 2022-07-26 VITALS
HEART RATE: 69 BPM | WEIGHT: 145.5 LBS | BODY MASS INDEX: 23.38 KG/M2 | DIASTOLIC BLOOD PRESSURE: 74 MMHG | RESPIRATION RATE: 14 BRPM | SYSTOLIC BLOOD PRESSURE: 134 MMHG | TEMPERATURE: 99 F | HEIGHT: 66 IN

## 2022-07-26 DIAGNOSIS — B20 AIDS: ICD-10-CM

## 2022-07-26 DIAGNOSIS — A31.2 DISSEMINATED MYCOBACTERIUM AVIUM-INTRACELLULARE COMPLEX: ICD-10-CM

## 2022-07-26 DIAGNOSIS — N28.9 RENAL INSUFFICIENCY: Primary | ICD-10-CM

## 2022-07-26 DIAGNOSIS — B20 AIDS (ACQUIRED IMMUNODEFICIENCY SYNDROME), CD4 <=200: Primary | ICD-10-CM

## 2022-07-26 DIAGNOSIS — Z87.01 HISTORY OF PNEUMOCYSTIS PNEUMONIA: ICD-10-CM

## 2022-07-26 DIAGNOSIS — E55.9 VITAMIN D DEFICIENCY: ICD-10-CM

## 2022-07-26 DIAGNOSIS — Z86.19 HISTORY OF SYPHILIS: ICD-10-CM

## 2022-07-26 DIAGNOSIS — N28.9 RENAL INSUFFICIENCY: ICD-10-CM

## 2022-07-26 LAB
ALBUMIN SERPL-MCNC: 4.5 GM/DL (ref 3.5–5)
ALBUMIN/GLOB SERPL: 1.2 RATIO (ref 1.1–2)
ALP SERPL-CCNC: 98 UNIT/L (ref 40–150)
ALT SERPL-CCNC: 37 UNIT/L (ref 0–55)
AST SERPL-CCNC: 33 UNIT/L (ref 5–34)
BASOPHILS # BLD AUTO: 0.06 X10(3)/MCL (ref 0–0.2)
BASOPHILS NFR BLD AUTO: 1.7 %
BILIRUBIN DIRECT+TOT PNL SERPL-MCNC: 0.8 MG/DL
BUN SERPL-MCNC: 15.3 MG/DL (ref 8.9–20.6)
CALCIUM SERPL-MCNC: 9.8 MG/DL (ref 8.4–10.2)
CHLORIDE SERPL-SCNC: 106 MMOL/L (ref 98–107)
CO2 SERPL-SCNC: 24 MMOL/L (ref 22–29)
CREAT SERPL-MCNC: 1.56 MG/DL (ref 0.73–1.18)
DEPRECATED CALCIDIOL+CALCIFEROL SERPL-MC: 29.9 NG/ML (ref 30–80)
EOSINOPHIL # BLD AUTO: 0.17 X10(3)/MCL (ref 0–0.9)
EOSINOPHIL NFR BLD AUTO: 4.8 %
ERYTHROCYTE [DISTWIDTH] IN BLOOD BY AUTOMATED COUNT: 13.1 % (ref 11.5–17)
GLOBULIN SER-MCNC: 3.9 GM/DL (ref 2.4–3.5)
GLUCOSE SERPL-MCNC: 96 MG/DL (ref 74–100)
HCT VFR BLD AUTO: 41.9 % (ref 42–52)
HGB BLD-MCNC: 14.2 GM/DL (ref 14–18)
IMM GRANULOCYTES # BLD AUTO: 0.01 X10(3)/MCL (ref 0–0.04)
IMM GRANULOCYTES NFR BLD AUTO: 0.3 %
LYMPHOCYTES # BLD AUTO: 1.62 X10(3)/MCL (ref 0.6–4.6)
LYMPHOCYTES NFR BLD AUTO: 46.2 %
MCH RBC QN AUTO: 30.7 PG (ref 27–31)
MCHC RBC AUTO-ENTMCNC: 33.9 MG/DL (ref 33–36)
MCV RBC AUTO: 90.5 FL (ref 80–94)
MONOCYTES # BLD AUTO: 0.31 X10(3)/MCL (ref 0.1–1.3)
MONOCYTES NFR BLD AUTO: 8.8 %
NEUTROPHILS # BLD AUTO: 1.3 X10(3)/MCL (ref 2.1–9.2)
NEUTROPHILS NFR BLD AUTO: 38.2 %
NRBC BLD AUTO-RTO: 0 %
PLATELET # BLD AUTO: 337 X10(3)/MCL (ref 130–400)
PMV BLD AUTO: 9.1 FL (ref 7.4–10.4)
POTASSIUM SERPL-SCNC: 4.6 MMOL/L (ref 3.5–5.1)
PROT SERPL-MCNC: 8.4 GM/DL (ref 6.4–8.3)
RBC # BLD AUTO: 4.63 X10(6)/MCL (ref 4.7–6.1)
RPR SER QL: NORMAL
RPR SER QL: NORMAL
RPR SER-TITR: NORMAL {TITER}
SODIUM SERPL-SCNC: 136 MMOL/L (ref 136–145)
T PALLIDUM AB SER QL: REACTIVE
WBC # SPEC AUTO: 3.5 X10(3)/MCL (ref 4.5–11.5)

## 2022-07-26 PROCEDURE — 3008F PR BODY MASS INDEX (BMI) DOCUMENTED: ICD-10-PCS | Mod: CPTII,,, | Performed by: NURSE PRACTITIONER

## 2022-07-26 PROCEDURE — 3075F PR MOST RECENT SYSTOLIC BLOOD PRESS GE 130-139MM HG: ICD-10-PCS | Mod: CPTII,,, | Performed by: NURSE PRACTITIONER

## 2022-07-26 PROCEDURE — 86780 TREPONEMA PALLIDUM: CPT | Performed by: NURSE PRACTITIONER

## 2022-07-26 PROCEDURE — 1159F MED LIST DOCD IN RCRD: CPT | Mod: CPTII,,, | Performed by: NURSE PRACTITIONER

## 2022-07-26 PROCEDURE — 82306 VITAMIN D 25 HYDROXY: CPT | Performed by: NURSE PRACTITIONER

## 2022-07-26 PROCEDURE — 36415 COLL VENOUS BLD VENIPUNCTURE: CPT | Performed by: NURSE PRACTITIONER

## 2022-07-26 PROCEDURE — 85025 COMPLETE CBC W/AUTO DIFF WBC: CPT | Performed by: NURSE PRACTITIONER

## 2022-07-26 PROCEDURE — 3075F SYST BP GE 130 - 139MM HG: CPT | Mod: CPTII,,, | Performed by: NURSE PRACTITIONER

## 2022-07-26 PROCEDURE — 3078F PR MOST RECENT DIASTOLIC BLOOD PRESSURE < 80 MM HG: ICD-10-PCS | Mod: CPTII,,, | Performed by: NURSE PRACTITIONER

## 2022-07-26 PROCEDURE — 3078F DIAST BP <80 MM HG: CPT | Mod: CPTII,,, | Performed by: NURSE PRACTITIONER

## 2022-07-26 PROCEDURE — 1160F PR REVIEW ALL MEDS BY PRESCRIBER/CLIN PHARMACIST DOCUMENTED: ICD-10-PCS | Mod: CPTII,,, | Performed by: NURSE PRACTITIONER

## 2022-07-26 PROCEDURE — 99215 OFFICE O/P EST HI 40 MIN: CPT | Mod: S$PBB,,, | Performed by: NURSE PRACTITIONER

## 2022-07-26 PROCEDURE — 86361 T CELL ABSOLUTE COUNT: CPT | Performed by: NURSE PRACTITIONER

## 2022-07-26 PROCEDURE — 1159F PR MEDICATION LIST DOCUMENTED IN MEDICAL RECORD: ICD-10-PCS | Mod: CPTII,,, | Performed by: NURSE PRACTITIONER

## 2022-07-26 PROCEDURE — 3008F BODY MASS INDEX DOCD: CPT | Mod: CPTII,,, | Performed by: NURSE PRACTITIONER

## 2022-07-26 PROCEDURE — 99215 PR OFFICE/OUTPT VISIT, EST, LEVL V, 40-54 MIN: ICD-10-PCS | Mod: S$PBB,,, | Performed by: NURSE PRACTITIONER

## 2022-07-26 PROCEDURE — 99214 OFFICE O/P EST MOD 30 MIN: CPT | Mod: PBBFAC | Performed by: NURSE PRACTITIONER

## 2022-07-26 PROCEDURE — 1160F RVW MEDS BY RX/DR IN RCRD: CPT | Mod: CPTII,,, | Performed by: NURSE PRACTITIONER

## 2022-07-26 PROCEDURE — 80053 COMPREHEN METABOLIC PANEL: CPT | Performed by: NURSE PRACTITIONER

## 2022-07-26 PROCEDURE — 86592 SYPHILIS TEST NON-TREP QUAL: CPT | Performed by: NURSE PRACTITIONER

## 2022-07-26 RX ORDER — CLARITHROMYCIN 500 MG/1
500 TABLET, FILM COATED ORAL 2 TIMES DAILY
Qty: 60 TABLET | Refills: 3 | Status: SHIPPED | OUTPATIENT
Start: 2022-07-26 | End: 2022-12-21

## 2022-07-26 RX ORDER — ABACAVIR SULFATE, DOLUTEGRAVIR SODIUM, LAMIVUDINE 600; 50; 300 MG/1; MG/1; MG/1
1 TABLET, FILM COATED ORAL DAILY
Qty: 30 TABLET | Refills: 3 | Status: SHIPPED | OUTPATIENT
Start: 2022-07-26 | End: 2022-10-26 | Stop reason: SDUPTHER

## 2022-07-26 RX ORDER — ERGOCALCIFEROL 1.25 MG/1
50000 CAPSULE ORAL
Qty: 4 CAPSULE | Refills: 3 | Status: SHIPPED | OUTPATIENT
Start: 2022-07-26 | End: 2022-10-26 | Stop reason: SDUPTHER

## 2022-07-26 RX ORDER — ETHAMBUTOL HYDROCHLORIDE 400 MG/1
800 TABLET, FILM COATED ORAL DAILY
Qty: 30 TABLET | Refills: 3 | Status: SHIPPED | OUTPATIENT
Start: 2022-07-26 | End: 2022-12-21

## 2022-07-26 RX ORDER — RIFAMPIN 300 MG/1
300 CAPSULE ORAL 2 TIMES DAILY
Qty: 60 CAPSULE | Refills: 3 | Status: SHIPPED | OUTPATIENT
Start: 2022-07-26 | End: 2022-12-21

## 2022-07-26 RX ORDER — SULFAMETHOXAZOLE AND TRIMETHOPRIM 800; 160 MG/1; MG/1
1 TABLET ORAL DAILY
Qty: 30 TABLET | Refills: 3 | Status: SHIPPED | OUTPATIENT
Start: 2022-07-26 | End: 2022-07-26 | Stop reason: ALTCHOICE

## 2022-07-26 NOTE — PROGRESS NOTES
Subjective:       Patient ID: Azael Hodge is a 32 y.o. male.    Chief Complaint: Follow-up (B20)    Azael is a 31 y/o AAM here for AIDS f/u.  MSM. Dx 3/7/16. HLA  negative. Hospitalized from 10/19/21-11/9/21 with a diagnosis of AIDS with PJP and Disseminated MAC. Treated inpatient for PJP. Then placed on Bactrim DS 1 po q day. Pt reports continued adherence. AFB ordered 10/19/21 resulted as + AFB MAC on 11/1/21. Pt was started on Clarithromycin, Ethambutol, and Rifampin 11/1/21. 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 Repeat AFB done 11/19/21 neg. Pt will need 1 year of treatment from this date. However, he stopped taking the Ethambutol about one month ago (6/26/22-7/26/22) due to light sensitivity to his eyes at random times. Pt is followed by the [facility] eye clinic, but tells me he missed his last appt. I will send a referral. He will need to resume his Ethambutol. I do not feel that this is an optic neuritis, but will refer to eye clinic. Last eye clinic visit was 1/31/22. Pt tells me this happens when he goes outside infrequently. Glasses are supposed to darken, however patient does not think they do a very good job. He reports adherence to Triumeq 1 po  q day and  Tivicay 50mg 1 po q day (pt is on Rifampin so Tivicay has to be BID, Bactrim 1 po q day and MAC therapy). Pt is tolerating the meds well. Pt denies fever, headache, chills, visual problems, N/V/D, SOB, cough, chest pain or abd pain. Reviewed labs from 4/21/22 HIV VL 57, CD4 179, RPR 0 dils. Hx of syphilis. Treated with Bicillin LA 2.4 million units x 1 3/1/19.  RPR titer at that time was 1:128. Repeat RPR 0 12/17/21. I plan to hold vaccinations until CD4 improves. Pt reports adherence to Ergocalciferol 50,000 units once weekly.  He is followed by Dr. Cage for mediastinal lymphadenopathy that noted during hospitalization 11/20/21-11/30/21.  CT thorax/abd/pelvis done 4/28/22 showed improvement of  lymphadenopathy. He has a f/u with Dr. Cage 11/15/22. Creatinine 1.25 on 6/13/22 labs.     I spent a total of 59 minutes on the day of the visit.This includes face to face time and non-face to face time preparing to see the patient (eg, review of tests), obtaining and/or reviewing separately obtained history, documenting clinical information in the electronic or other health record, independently interpreting results and communicating results to the patient/family/caregiver, or care coordinator.    Review of Systems   Constitutional: Negative.    HENT: Negative.    Eyes: Negative.    Respiratory: Negative.    Cardiovascular: Negative.    Gastrointestinal: Negative.    Genitourinary: Negative.    Musculoskeletal: Negative.    Integumentary:  Negative.   Neurological: Negative.    Psychiatric/Behavioral: Negative.          Objective:      Physical Exam  Vitals reviewed.   Constitutional:       General: He is not in acute distress.     Appearance: Normal appearance.   HENT:      Head: Normocephalic.      Right Ear: External ear normal.      Left Ear: External ear normal.      Nose: Nose normal.      Mouth/Throat:      Mouth: Mucous membranes are moist.      Pharynx: Oropharynx is clear.   Eyes:      General: No scleral icterus.     Extraocular Movements: Extraocular movements intact.      Conjunctiva/sclera: Conjunctivae normal.      Pupils: Pupils are equal, round, and reactive to light.   Cardiovascular:      Rate and Rhythm: Normal rate and regular rhythm.      Pulses: Normal pulses.      Heart sounds: Normal heart sounds.   Pulmonary:      Effort: Pulmonary effort is normal. No respiratory distress.      Breath sounds: Normal breath sounds.   Abdominal:      General: Bowel sounds are normal. There is no distension.      Palpations: Abdomen is soft. There is no mass.      Tenderness: There is no abdominal tenderness. There is no right CVA tenderness or left CVA tenderness.      Hernia: No hernia is present.    Musculoskeletal:         General: No tenderness or signs of injury. Normal range of motion.      Cervical back: Normal range of motion and neck supple.      Right lower leg: No edema.      Left lower leg: No edema.   Lymphadenopathy:      Cervical: No cervical adenopathy.   Skin:     General: Skin is warm and dry.      Capillary Refill: Capillary refill takes less than 2 seconds.      Findings: No erythema or lesion.   Neurological:      General: No focal deficit present.      Mental Status: He is alert and oriented to person, place, and time. Mental status is at baseline.   Psychiatric:         Mood and Affect: Mood normal.         Behavior: Behavior normal.         Thought Content: Thought content normal.         Judgment: Judgment normal.         Assessment:       Problem List Items Addressed This Visit        ID    Human immunodeficiency virus (HIV) disease - Primary    Relevant Medications    dolutegravir (TIVICAY) 50 mg Tab    TRIUMEQ 600- mg Tab    sulfamethoxazole-trimethoprim 800-160mg (BACTRIM DS) 800-160 mg Tab      Other Visit Diagnoses     Disseminated mycobacterium avium-intracellulare complex        Relevant Medications    clarithromycin (BIAXIN) 500 MG tablet    ethambutoL (MYAMBUTOL) 400 MG Tab    rifAMpin (RIFADIN) 300 MG capsule    History of syphilis        Relevant Orders    SYPHILIS ANTIBODY (WITH REFLEX RPR) (Completed)    RPR (Completed)    Syphilis Ab, TP-PA    History of pneumocystis pneumonia        Vitamin D deficiency        Relevant Medications    ergocalciferol (VITAMIN D2) 50,000 unit Cap    Other Relevant Orders    Vitamin D (Completed)    Renal insufficiency              Plan:       AIDS (acquired immunodeficiency syndrome), CD4 <=200  -     dolutegravir (TIVICAY) 50 mg Tab; Take 1 tablet (50 mg total) by mouth once daily.  Dispense: 60 tablet; Refill: 3  -     TRIUMEQ 600- mg Tab; Take 1 tablet by mouth once daily.  Dispense: 30 tablet; Refill: 3  -      sulfamethoxazole-trimethoprim 800-160mg (BACTRIM DS) 800-160 mg Tab; Take 1 tablet by mouth once daily.  Dispense: 30 tablet; Refill: 3  -     HIV-1 RNA, Quantitative, PCR with Reflex to Genotype; Future; Expected date: 07/26/2022  -     CD4 Lymphocytes; Future; Expected date: 07/26/2022  -     CBC Auto Differential; Future; Expected date: 07/26/2022  -     Comprehensive Metabolic Panel; Future; Expected date: 07/26/2022  -     Ambulatory referral/consult to Ophthalmology; Future; Expected date: 08/02/2022  Triumeq 1 po q day (restarted 10/11) + an additional Tivicay 50mg q pm. Pt requires Tivicay 1 po BID while taking Rifampin.  Bactrim DS 1 po q day (completed 21 day treatment course for PJP on 11/8)  Medication adherence, resistance, and sexual protection with condoms discussed in depth  RTC 3 months with Franca for an in office visit, labs today   Pt attended eye appt on 1/31/22  Hold vaccines until CD4 improves     Disseminated mycobacterium avium-intracellulare complex  -     clarithromycin (BIAXIN) 500 MG tablet; Take 1 tablet (500 mg total) by mouth 2 (two) times a day.  Dispense: 60 tablet; Refill: 3  -     ethambutoL (MYAMBUTOL) 400 MG Tab; Take 2 tablets (800 mg total) by mouth once daily.  Dispense: 30 tablet; Refill: 3  -     rifAMpin (RIFADIN) 300 MG capsule; Take 1 capsule (300 mg total) by mouth 2 (two) times a day. Once a day  Dispense: 60 capsule; Refill: 3  AFB from 10/19/21 + . This resulted on 11/1/21. Pt was started on Clarithromycin, Ethambutol, and Rifampin 11/1/21. 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 Repeat AFB negative 11/19/21.   Pt was off of Ethambutol from 6/26/22-7/26/22. Pt will need to resume.   Referred to eye clinic for f/u.      History of syphilis  -     SYPHILIS ANTIBODY (WITH REFLEX RPR); Future; Expected date: 07/26/2022  -     RPR  -     Syphilis Ab, TP-PA  Treated with Bicillin LA 2.4 million units x 1 3/1/19.  RPR titer at that  time was 1:128. Repeat RPR 0 4/21/22    History of pneumocystis pneumonia  Continue Bactrim DS 1 po q day for OI prophy     Vitamin D deficiency  -     Vitamin D; Future; Expected date: 07/26/2022  -     ergocalciferol (VITAMIN D2) 50,000 unit Cap; Take 1 capsule (50,000 Units total) by mouth every 7 days.  Dispense: 4 capsule; Refill: 3  Continue meds. Pt educated on importance of Vit D to bone and organ health.    Renal insufficiency  Keep hydrated.  Avoid NSAIDs (ibuprofen, naproxen, aleve, advil, toradol or mobic)  Keep BP (goal <130/80) and cholesterol (LDL <100) within recommended goals  Low sodium diet encouraged; 2 grams per day  Increase exercise activity; 30 minutes 3-5 x per week   Maintain a healthy weight  Smoking cessation encouraged   Decrease ETOH intake/encouraged cessation   Medication compliance stressed   Keep all follow up appointments as scheduled

## 2022-07-26 NOTE — TELEPHONE ENCOUNTER
Reviewed labs from 7/26/22 creatinine 1.56. Please contact patient with the following recommendations:  Avoid NSAIDs (ibuprofen, naproxen, aleve, advil, toradol or mobic)  Keep BP (goal <130/80) and cholesterol (LDL <100) within recommended goals  Low sodium diet encouraged; 2 grams per day  Increase exercise activity; 30 minutes 3-5 x per week   Maintain a healthy weight  Smoking cessation encouraged   Decrease ETOH intake/encouraged cessation   Medication compliance stressed   Keep all follow up appointments as scheduled  Pt will need additional labs G6PD, Toxo igg, and CMV IGG as soon as possible.  He will also need a repeat BMP in 2 weeks. Once I have these labs, I will make a decision on whether or not to switch from Bactrim DS to Bactrim SS or Dapsone. Atovaquone is not recommended with Rifampin.

## 2022-07-28 LAB — HIV1 RNA # PLAS NAA DL=20: <20 COPIES/ML

## 2022-07-30 LAB — T PALLIDUM AB SER QL: NORMAL

## 2022-08-01 ENCOUNTER — TELEPHONE (OUTPATIENT)
Dept: INFECTIOUS DISEASES | Facility: CLINIC | Age: 32
End: 2022-08-01
Payer: MEDICAID

## 2022-08-01 ENCOUNTER — LAB VISIT (OUTPATIENT)
Dept: LAB | Facility: HOSPITAL | Age: 32
End: 2022-08-01
Attending: NURSE PRACTITIONER
Payer: MEDICAID

## 2022-08-01 DIAGNOSIS — N28.9 RENAL INSUFFICIENCY: ICD-10-CM

## 2022-08-01 DIAGNOSIS — B20 AIDS: ICD-10-CM

## 2022-08-01 LAB
ANION GAP SERPL CALC-SCNC: 10 MEQ/L
BUN SERPL-MCNC: 10.5 MG/DL (ref 8.9–20.6)
CALCIUM SERPL-MCNC: 10.5 MG/DL (ref 8.4–10.2)
CHLORIDE SERPL-SCNC: 103 MMOL/L (ref 98–107)
CO2 SERPL-SCNC: 22 MMOL/L (ref 22–29)
CREAT SERPL-MCNC: 1.43 MG/DL (ref 0.73–1.18)
CREAT/UREA NIT SERPL: 7
GLUCOSE SERPL-MCNC: 119 MG/DL (ref 74–100)
POTASSIUM SERPL-SCNC: 4.8 MMOL/L (ref 3.5–5.1)
SODIUM SERPL-SCNC: 135 MMOL/L (ref 136–145)

## 2022-08-01 PROCEDURE — 86778 TOXOPLASMA ANTIBODY IGM: CPT

## 2022-08-01 PROCEDURE — 36415 COLL VENOUS BLD VENIPUNCTURE: CPT

## 2022-08-01 PROCEDURE — 86644 CMV ANTIBODY: CPT

## 2022-08-01 PROCEDURE — 82955 ASSAY OF G6PD ENZYME: CPT

## 2022-08-01 PROCEDURE — 80048 BASIC METABOLIC PNL TOTAL CA: CPT

## 2022-08-01 NOTE — TELEPHONE ENCOUNTER
Informed pt that Gloria is out of the office, Anna Marie is taking her place until she returns but she is gone for the day, will forward message to her and have her contact pt

## 2022-08-01 NOTE — TELEPHONE ENCOUNTER
----- Message from Ping Weekly sent at 8/1/2022  3:42 PM CDT -----  Regarding: [facility] ID:  PT called  RICARDA PT    Pt called to discuss a continuation form that is not completely filled out.  Pt would like Rhonda to call back at [phone]

## 2022-08-02 ENCOUNTER — APPOINTMENT (OUTPATIENT)
Dept: LAB | Facility: HOSPITAL | Age: 32
End: 2022-08-02
Attending: NURSE PRACTITIONER
Payer: MEDICAID

## 2022-08-02 ENCOUNTER — PATIENT MESSAGE (OUTPATIENT)
Dept: INFECTIOUS DISEASES | Facility: CLINIC | Age: 32
End: 2022-08-02
Payer: MEDICAID

## 2022-08-02 LAB
AGE: 33
CD3+CD4+ CELLS # BLD: 46.2 % (ref 28–48)
CD3+CD4+ CELLS # SPEC: 216.68 UNIT/L (ref 589–1505)
CD3+CD4+ CELLS NFR BLD: 13.4 %
LYMPHOCYTES # BLD AUTO: 1617 X10(3)/MCL (ref 1260–5520)
LYMPHOMA - T-CELL MARKERS SPEC-IMP: ABNORMAL
WBC # BLD AUTO: 3500 /MM3 (ref 4500–11500)

## 2022-08-02 PROCEDURE — 82955 ASSAY OF G6PD ENZYME: CPT

## 2022-08-02 PROCEDURE — 36415 COLL VENOUS BLD VENIPUNCTURE: CPT

## 2022-08-02 NOTE — TELEPHONE ENCOUNTER
Patient came to office with a form that needs to be completed and faxed to [facility].   Our office faxed some forms on 7/14/22 but they are not requesting for this form to be filled out.

## 2022-08-02 NOTE — TELEPHONE ENCOUNTER
The form he dropped off is addressed to him. He needs to complete the form and return it.    I called Azael to let know he needs to fill out the form, he will return today to [facility].

## 2022-08-03 LAB
CMV IGG SERPL QL IA: POSITIVE
T GONDII IGG SER QL IA: NEGATIVE
T GONDII IGG SER-ACNC: <3 IU/ML
T GONDII IGM SERPL QL IA: NEGATIVE

## 2022-08-04 LAB — G6PD RBC-CCNT: 9.4 U/G HB (ref 8–11.9)

## 2022-08-12 ENCOUNTER — TELEPHONE (OUTPATIENT)
Dept: INFECTIOUS DISEASES | Facility: CLINIC | Age: 32
End: 2022-08-12
Payer: MEDICAID

## 2022-08-12 NOTE — TELEPHONE ENCOUNTER
----- Message from Hetal Alejandre sent at 8/12/2022  1:20 PM CDT -----  Regarding: [facility] ID: Paperwork  RICARDA PT - (Anna Marie)        Pt wants to speak with Anna Marie regarding the paperwork. Please call back @ [phone]

## 2022-09-25 ENCOUNTER — HOSPITAL ENCOUNTER (EMERGENCY)
Facility: HOSPITAL | Age: 32
Discharge: HOME | End: 2022-09-25
Attending: EMERGENCY MEDICINE
Payer: MEDICAID

## 2022-09-25 VITALS
TEMPERATURE: 99 F | WEIGHT: 147 LBS | BODY MASS INDEX: 23.63 KG/M2 | DIASTOLIC BLOOD PRESSURE: 89 MMHG | HEART RATE: 78 BPM | SYSTOLIC BLOOD PRESSURE: 129 MMHG | RESPIRATION RATE: 18 BRPM | OXYGEN SATURATION: 99 % | HEIGHT: 66 IN

## 2022-09-25 DIAGNOSIS — M54.2 NECK PAIN: ICD-10-CM
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
## 2022-09-25 PROCEDURE — 99284 EMERGENCY DEPT VISIT MOD MDM: CPT

## 2022-09-25 RX ORDER — DICLOFENAC SODIUM 75 MG/1
75 TABLET, DELAYED RELEASE ORAL 2 TIMES DAILY
Qty: 10 TABLET | Refills: 0 | Status: SHIPPED | OUTPATIENT
Start: 2022-09-25 | End: 2022-09-30

## 2022-09-25 RX ORDER — METHOCARBAMOL 500 MG/1
1000 TABLET, FILM COATED ORAL 2 TIMES DAILY PRN
Qty: 20 TABLET | Refills: 0 | Status: SHIPPED | OUTPATIENT
Start: 2022-09-25 | End: 2022-09-30

## 2022-09-25 NOTE — DISCHARGE INSTRUCTIONS
Take medications as directed and follow up with your doctor in 1 week. Return to the ED for worsening symptoms.

## 2022-10-05 ENCOUNTER — HOSPITAL ENCOUNTER (EMERGENCY)
Facility: HOSPITAL | Age: 32
Discharge: HOME | End: 2022-10-05
Attending: FAMILY MEDICINE
Payer: MEDICAID

## 2022-10-05 VITALS
TEMPERATURE: 98 F | BODY MASS INDEX: 24.15 KG/M2 | SYSTOLIC BLOOD PRESSURE: 117 MMHG | RESPIRATION RATE: 16 BRPM | HEART RATE: 78 BPM | OXYGEN SATURATION: 100 % | DIASTOLIC BLOOD PRESSURE: 89 MMHG | WEIGHT: 150.25 LBS | HEIGHT: 66 IN

## 2022-10-05 DIAGNOSIS — S00.452A EMBEDDED EARRING OF LEFT EAR, INITIAL ENCOUNTER: Primary | ICD-10-CM

## 2022-10-05 PROCEDURE — 99281 EMR DPT VST MAYX REQ PHY/QHP: CPT | Mod: 25

## 2022-10-05 NOTE — ED PROVIDER NOTES
Encounter Date: 10/5/2022       History     Chief Complaint   Patient presents with    Oral Swelling     Pt to ed requesting an incision to his lip to release a piercing that has healed on top of the piercing plate. Denies any signs of infection     Patient with pmhx of HIV presents today requesting lip piercing to be removed. The mucosa over the inner portion of his lower lip has grown over the back of the earring. He denies any pain or swelling. Denies fever, drainage.     The history is provided by the patient. No  was used.   Review of patient's allergies indicates:   Allergen Reactions    Banana      Other reaction(s): Pharyngeal edema    Benzodiazepines Hallucinations     Past Medical History:   Diagnosis Date    ADHD (attention deficit hyperactivity disorder)     Ages 5 through 13.  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  Family History   Problem Relation Age of Onset    Hypertension Mother     Migraines Father     Hypertension Father     Anxiety disorder Father     Diabetes Brother     Hypertension Brother      Social History     Tobacco Use    Smoking status: Never    Smokeless tobacco: Never   Substance Use Topics    Alcohol use: No    Drug use: Never     Review of Systems   Constitutional:  Negative for chills and fever.   HENT:  Negative for dental problem, facial swelling and mouth sores.    Respiratory:  Negative for chest tightness and shortness of breath.    Cardiovascular:  Negative for chest pain, palpitations and leg swelling.   Gastrointestinal:  Negative for abdominal pain, diarrhea, nausea and vomiting.   Genitourinary:  Negative for dysuria, flank pain and hematuria.   Musculoskeletal:  Negative for arthralgias and myalgias.   Skin:  Negative for rash.   Neurological:  Negative for syncope, light-headedness and headaches.     Physical Exam     Initial Vitals [10/05/22 1044]   BP  Pulse Resp Temp SpO2   (!) 122/91 81 16 98.2 °F (36.8 °C) 100 %      MAP       --         Physical Exam    Vitals reviewed.  Constitutional: He appears well-developed and well-nourished. He is not diaphoretic. No distress.   HENT:   Head: Normocephalic and atraumatic.   Mouth/Throat: Oropharynx is clear and moist. No oropharyngeal exudate.   Lower lip with lip ring. The back of the ring is embedded. There is not swelling, overlying erythema,  or drainage. Overall, no signs of infection.    Eyes: Conjunctivae and EOM are normal.   Neck: Neck supple.   Normal range of motion.  Cardiovascular:  Normal rate, regular rhythm, normal heart sounds and intact distal pulses.           Pulmonary/Chest: Breath sounds normal. No respiratory distress.   Abdominal: Abdomen is soft. Bowel sounds are normal. He exhibits no distension. There is no abdominal tenderness. There is no rebound and no guarding.   Musculoskeletal:         General: No edema.      Cervical back: Normal range of motion and neck supple.     Neurological: He is alert and oriented to person, place, and time. GCS score is 15. GCS eye subscore is 4. GCS verbal subscore is 5. GCS motor subscore is 6.   Skin: Skin is warm and dry. Capillary refill takes less than 2 seconds.   Psychiatric: He has a normal mood and affect.       ED Course   Procedures  Labs Reviewed - No data to display       Imaging Results    None          Medications - No data to display                           Clinical Impression:   Final diagnoses:  [S00.452A] Embedded earring of left ear, initial encounter (Primary)      ED Disposition Condition    Discharge Stable          ED Prescriptions    None       Follow-up Information       Follow up With Specialties Details Why Contact Info    [facility] - [facility] Dept Emergency Medicine  If symptoms worsen return to ED immediately [address] [facility] [address] [phone]    KASIA Kirkpatrick Nurse Practitioner In 2  days  [address] [facility] [address]  [phone]      [facility]                 SOLO Denny  10/05/22 0435

## 2022-10-26 ENCOUNTER — OFFICE VISIT (OUTPATIENT)
Dept: INFECTIOUS DISEASES | Facility: CLINIC | Age: 32
End: 2022-10-26
Payer: MEDICAID

## 2022-10-26 VITALS
DIASTOLIC BLOOD PRESSURE: 79 MMHG | WEIGHT: 155.88 LBS | HEART RATE: 69 BPM | BODY MASS INDEX: 25.05 KG/M2 | TEMPERATURE: 98 F | HEIGHT: 66 IN | RESPIRATION RATE: 14 BRPM | SYSTOLIC BLOOD PRESSURE: 138 MMHG

## 2022-10-26 DIAGNOSIS — E55.9 VITAMIN D DEFICIENCY: ICD-10-CM

## 2022-10-26 DIAGNOSIS — B20 AIDS (ACQUIRED IMMUNODEFICIENCY SYNDROME), CD4 >200 AND <500: Primary | ICD-10-CM

## 2022-10-26 DIAGNOSIS — Z11.3 ROUTINE SCREENING FOR STI (SEXUALLY TRANSMITTED INFECTION): ICD-10-CM

## 2022-10-26 DIAGNOSIS — R79.89 ELEVATED TSH: ICD-10-CM

## 2022-10-26 DIAGNOSIS — A31.2 DISSEMINATED MYCOBACTERIUM AVIUM-INTRACELLULARE COMPLEX: ICD-10-CM

## 2022-10-26 DIAGNOSIS — Z86.19 HISTORY OF PNEUMOCYSTIS JIROVECII PNEUMONIA: ICD-10-CM

## 2022-10-26 DIAGNOSIS — Z86.19 HISTORY OF SYPHILIS: ICD-10-CM

## 2022-10-26 DIAGNOSIS — Z23 IMMUNIZATION DUE: ICD-10-CM

## 2022-10-26 LAB
ALBUMIN SERPL-MCNC: 4.5 GM/DL (ref 3.5–5)
ALBUMIN/GLOB SERPL: 1.2 RATIO (ref 1.1–2)
ALP SERPL-CCNC: 91 UNIT/L (ref 40–150)
ALT SERPL-CCNC: 26 UNIT/L (ref 0–55)
AST SERPL-CCNC: 33 UNIT/L (ref 5–34)
BASOPHILS # BLD AUTO: 0.05 X10(3)/MCL (ref 0–0.2)
BASOPHILS NFR BLD AUTO: 1.5 %
BILIRUBIN DIRECT+TOT PNL SERPL-MCNC: 0.7 MG/DL
BUN SERPL-MCNC: 9.1 MG/DL (ref 8.9–20.6)
C TRACH DNA SPEC QL NAA+PROBE: NOT DETECTED
C TRACH DNA SPEC QL NAA+PROBE: NOT DETECTED
CALCIUM SERPL-MCNC: 9.8 MG/DL (ref 8.4–10.2)
CHLORIDE SERPL-SCNC: 104 MMOL/L (ref 98–107)
CO2 SERPL-SCNC: 26 MMOL/L (ref 22–29)
CREAT SERPL-MCNC: 1.23 MG/DL (ref 0.73–1.18)
EOSINOPHIL # BLD AUTO: 0.22 X10(3)/MCL (ref 0–0.9)
EOSINOPHIL NFR BLD AUTO: 6.5 %
ERYTHROCYTE [DISTWIDTH] IN BLOOD BY AUTOMATED COUNT: 13.4 % (ref 11.5–17)
GFR SERPLBLD CREATININE-BSD FMLA CKD-EPI: >60 MLS/MIN/1.73/M2
GLOBULIN SER-MCNC: 3.7 GM/DL (ref 2.4–3.5)
GLUCOSE SERPL-MCNC: 81 MG/DL (ref 74–100)
HCT VFR BLD AUTO: 43.8 % (ref 42–52)
HGB BLD-MCNC: 14.4 GM/DL (ref 14–18)
IMM GRANULOCYTES # BLD AUTO: 0.01 X10(3)/MCL (ref 0–0.04)
IMM GRANULOCYTES NFR BLD AUTO: 0.3 %
LYMPHOCYTES # BLD AUTO: 1.74 X10(3)/MCL (ref 0.6–4.6)
LYMPHOCYTES NFR BLD AUTO: 51.8 %
MCH RBC QN AUTO: 30.4 PG (ref 27–31)
MCHC RBC AUTO-ENTMCNC: 32.9 MG/DL (ref 33–36)
MCV RBC AUTO: 92.4 FL (ref 80–94)
MONOCYTES # BLD AUTO: 0.29 X10(3)/MCL (ref 0.1–1.3)
MONOCYTES NFR BLD AUTO: 8.6 %
N GONORRHOEA DNA SPEC QL NAA+PROBE: NOT DETECTED
N GONORRHOEA DNA SPEC QL NAA+PROBE: NOT DETECTED
NEUTROPHILS # BLD AUTO: 1.1 X10(3)/MCL (ref 2.1–9.2)
NEUTROPHILS NFR BLD AUTO: 31.3 %
NRBC BLD AUTO-RTO: 0 %
PLATELET # BLD AUTO: 293 X10(3)/MCL (ref 130–400)
PMV BLD AUTO: 9.7 FL (ref 7.4–10.4)
POTASSIUM SERPL-SCNC: 4.4 MMOL/L (ref 3.5–5.1)
PROT SERPL-MCNC: 8.2 GM/DL (ref 6.4–8.3)
RBC # BLD AUTO: 4.74 X10(6)/MCL (ref 4.7–6.1)
SODIUM SERPL-SCNC: 137 MMOL/L (ref 136–145)
T PALLIDUM AB SER QL: REACTIVE
T4 FREE SERPL-MCNC: 0.93 NG/DL (ref 0.7–1.48)
TESTOST SERPL-MCNC: 710.36 NG/DL (ref 240.24–870.68)
TSH SERPL-ACNC: 2.91 UIU/ML (ref 0.35–4.94)
WBC # SPEC AUTO: 3.4 X10(3)/MCL (ref 4.5–11.5)

## 2022-10-26 PROCEDURE — 1160F PR REVIEW ALL MEDS BY PRESCRIBER/CLIN PHARMACIST DOCUMENTED: ICD-10-PCS | Mod: CPTII,,, | Performed by: NURSE PRACTITIONER

## 2022-10-26 PROCEDURE — 99215 OFFICE O/P EST HI 40 MIN: CPT | Mod: S$PBB,,, | Performed by: NURSE PRACTITIONER

## 2022-10-26 PROCEDURE — 86592 SYPHILIS TEST NON-TREP QUAL: CPT | Performed by: NURSE PRACTITIONER

## 2022-10-26 PROCEDURE — 99215 PR OFFICE/OUTPT VISIT, EST, LEVL V, 40-54 MIN: ICD-10-PCS | Mod: S$PBB,,, | Performed by: NURSE PRACTITIONER

## 2022-10-26 PROCEDURE — 87491 CHLMYD TRACH DNA AMP PROBE: CPT | Performed by: NURSE PRACTITIONER

## 2022-10-26 PROCEDURE — 86780 TREPONEMA PALLIDUM: CPT | Performed by: NURSE PRACTITIONER

## 2022-10-26 PROCEDURE — 3078F DIAST BP <80 MM HG: CPT | Mod: CPTII,,, | Performed by: NURSE PRACTITIONER

## 2022-10-26 PROCEDURE — 1159F PR MEDICATION LIST DOCUMENTED IN MEDICAL RECORD: ICD-10-PCS | Mod: CPTII,,, | Performed by: NURSE PRACTITIONER

## 2022-10-26 PROCEDURE — 1159F MED LIST DOCD IN RCRD: CPT | Mod: CPTII,,, | Performed by: NURSE PRACTITIONER

## 2022-10-26 PROCEDURE — 99214 OFFICE O/P EST MOD 30 MIN: CPT | Mod: PBBFAC | Performed by: NURSE PRACTITIONER

## 2022-10-26 PROCEDURE — 87536 HIV-1 QUANT&REVRSE TRNSCRPJ: CPT | Performed by: NURSE PRACTITIONER

## 2022-10-26 PROCEDURE — 86361 T CELL ABSOLUTE COUNT: CPT | Performed by: NURSE PRACTITIONER

## 2022-10-26 PROCEDURE — 90471 IMMUNIZATION ADMIN: CPT | Mod: PBBFAC

## 2022-10-26 PROCEDURE — 84439 ASSAY OF FREE THYROXINE: CPT | Performed by: NURSE PRACTITIONER

## 2022-10-26 PROCEDURE — 3075F SYST BP GE 130 - 139MM HG: CPT | Mod: CPTII,,, | Performed by: NURSE PRACTITIONER

## 2022-10-26 PROCEDURE — 36415 COLL VENOUS BLD VENIPUNCTURE: CPT | Performed by: NURSE PRACTITIONER

## 2022-10-26 PROCEDURE — 84403 ASSAY OF TOTAL TESTOSTERONE: CPT | Performed by: NURSE PRACTITIONER

## 2022-10-26 PROCEDURE — 3075F PR MOST RECENT SYSTOLIC BLOOD PRESS GE 130-139MM HG: ICD-10-PCS | Mod: CPTII,,, | Performed by: NURSE PRACTITIONER

## 2022-10-26 PROCEDURE — 1160F RVW MEDS BY RX/DR IN RCRD: CPT | Mod: CPTII,,, | Performed by: NURSE PRACTITIONER

## 2022-10-26 PROCEDURE — 80053 COMPREHEN METABOLIC PANEL: CPT | Performed by: NURSE PRACTITIONER

## 2022-10-26 PROCEDURE — 3078F PR MOST RECENT DIASTOLIC BLOOD PRESSURE < 80 MM HG: ICD-10-PCS | Mod: CPTII,,, | Performed by: NURSE PRACTITIONER

## 2022-10-26 PROCEDURE — 84443 ASSAY THYROID STIM HORMONE: CPT | Performed by: NURSE PRACTITIONER

## 2022-10-26 PROCEDURE — 87591 N.GONORRHOEAE DNA AMP PROB: CPT | Performed by: NURSE PRACTITIONER

## 2022-10-26 PROCEDURE — 85025 COMPLETE CBC W/AUTO DIFF WBC: CPT | Performed by: NURSE PRACTITIONER

## 2022-10-26 PROCEDURE — 84402 ASSAY OF FREE TESTOSTERONE: CPT | Performed by: NURSE PRACTITIONER

## 2022-10-26 RX ORDER — ABACAVIR SULFATE, DOLUTEGRAVIR SODIUM, LAMIVUDINE 600; 50; 300 MG/1; MG/1; MG/1
1 TABLET, FILM COATED ORAL DAILY
Qty: 30 TABLET | Refills: 3 | Status: SHIPPED | OUTPATIENT
Start: 2022-10-26 | End: 2022-12-21 | Stop reason: SDUPTHER

## 2022-10-26 RX ORDER — ERGOCALCIFEROL 1.25 MG/1
50000 CAPSULE ORAL
Qty: 4 CAPSULE | Refills: 3 | Status: SHIPPED | OUTPATIENT
Start: 2022-10-26 | End: 2022-12-21 | Stop reason: SDUPTHER

## 2022-10-26 RX ORDER — SULFAMETHOXAZOLE AND TRIMETHOPRIM 800; 160 MG/1; MG/1
1 TABLET ORAL DAILY
COMMUNITY
Start: 2022-07-26 | End: 2022-10-26 | Stop reason: SDUPTHER

## 2022-10-26 RX ORDER — SULFAMETHOXAZOLE AND TRIMETHOPRIM 800; 160 MG/1; MG/1
1 TABLET ORAL DAILY
Qty: 30 TABLET | Refills: 3 | Status: SHIPPED | OUTPATIENT
Start: 2022-10-26 | End: 2022-12-21 | Stop reason: SDUPTHER

## 2022-10-26 NOTE — PROGRESS NOTES
Subjective:       Patient ID: Azael Hodge is a 32 y.o. male.    Chief Complaint: Follow-up (B20)    Azael is a 33 y/o AAM here for AIDS f/u. MSM. Dx 3/7/16. HLA  negative. Hospitalized from 10/19/21-11/9/21 with a diagnosis of AIDS with PJP and Disseminated MAC. Treated inpatient for PJP. Then placed on Bactrim DS 1 po q day. Pt reports continued adherence. AFB ordered 10/19/21 resulted as + AFB MAC on 11/1/21. Pt was started on Clarithromycin, Ethambutol, and Rifampin 11/1/21. 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 Repeat AFB done 11/19/21 neg. Pt is expected to complete MAC treatment 11/19/22. He reports adherence to Triumeq 1 po  q day and Tivicay 50mg 1 po q day (pt is on Rifampin so Tivicay has to be BID, Bactrim 1 po q day and MAC therapy). Pt is tolerating the meds well. Pt denies fever, headache, chills, visual problems, N/V/D, SOB, cough, chest pain or abd pain. Tells me he is working out daily.  Pt is using some creatinine products and some testosterone that is not prescribed. I encouraged patient to discontinue these products. Reviewed labs from 7/26/22 HIV VL <20, Cd4 216, Vit D 29.9, TSH 6.15, syphilis AB +, RPR NR.  Hx of syphilis. Treated with Bicillin LA 2.4 million units x 1 3/1/19.  RPR titer at that time was 1:128. Pt reports adherence to Ergocalciferol 50,000 units once weekly.  He is followed by Dr. Cage for mediastinal lymphadenopathy that noted during hospitalization 11/20/21-11/30/21.  CT thorax/abd/pelvis done 4/28/22 showed improvement of lymphadenopathy. He has a f/u with Dr. Cage 11/15/22. Pt attended eye clinic 1/31/22.  Pt will need STI screening today. He reports unprotected sex with a partner.  Pt tells me looks and feels better. He is almost back to his normal weight.      I spent a total of 50 minutes on the day of the visit.This includes face to face time and non-face to face time preparing to see the patient (eg, review  of tests), obtaining and/or reviewing separately obtained history, documenting clinical information in the electronic or other health record, independently interpreting results and communicating results to the patient/family/caregiver, or care coordinator.    Review of Systems   Constitutional: Negative.    HENT: Negative.     Eyes: Negative.    Respiratory: Negative.     Cardiovascular: Negative.    Gastrointestinal: Negative.    Genitourinary: Negative.    Musculoskeletal: Negative.    Integumentary:  Negative.   Neurological: Negative.    Psychiatric/Behavioral: Negative.         Objective:      Physical Exam  Vitals reviewed.   Constitutional:       General: He is not in acute distress.     Appearance: Normal appearance.   HENT:      Head: Normocephalic.      Right Ear: External ear normal.      Left Ear: External ear normal.      Nose: Nose normal.      Mouth/Throat:      Mouth: Mucous membranes are moist.      Pharynx: Oropharynx is clear.   Eyes:      General: No scleral icterus.     Extraocular Movements: Extraocular movements intact.      Conjunctiva/sclera: Conjunctivae normal.      Pupils: Pupils are equal, round, and reactive to light.   Cardiovascular:      Rate and Rhythm: Normal rate and regular rhythm.      Pulses: Normal pulses.      Heart sounds: Normal heart sounds.   Pulmonary:      Effort: Pulmonary effort is normal. No respiratory distress.      Breath sounds: Normal breath sounds.   Abdominal:      General: Bowel sounds are normal. There is no distension.      Palpations: Abdomen is soft. There is no mass.      Tenderness: There is no abdominal tenderness. There is no right CVA tenderness or left CVA tenderness.      Hernia: No hernia is present.   Musculoskeletal:         General: No tenderness or signs of injury. Normal range of motion.      Cervical back: Normal range of motion and neck supple.      Right lower leg: No edema.      Left lower leg: No edema.   Lymphadenopathy:      Cervical:  No cervical adenopathy.   Skin:     General: Skin is warm and dry.      Capillary Refill: Capillary refill takes less than 2 seconds.      Findings: No erythema or lesion.   Neurological:      General: No focal deficit present.      Mental Status: He is alert and oriented to person, place, and time. Mental status is at baseline.   Psychiatric:         Mood and Affect: Mood normal.         Behavior: Behavior normal.         Thought Content: Thought content normal.         Judgment: Judgment normal.       Assessment:       Problem List Items Addressed This Visit          ID    Human immunodeficiency virus (HIV) disease - Primary    Relevant Medications    TRIUMEQ 600- mg Tab    sulfamethoxazole-trimethoprim 800-160mg (BACTRIM DS) 800-160 mg Tab     Other Visit Diagnoses       Vitamin D deficiency        Relevant Medications    ergocalciferol (VITAMIN D2) 50,000 unit Cap    Disseminated mycobacterium avium-intracellulare complex        History of syphilis        Relevant Orders    SYPHILIS ANTIBODY (WITH REFLEX RPR)    History of Pneumocystis jirovecii pneumonia        Elevated TSH        Relevant Orders    TSH    T4, Free    Routine screening for STI (sexually transmitted infection)        Relevant Orders    C.trach/N.gonor AMP RNA    Chlamydia/GC, PCR    Chlamydia/GC, PCR    Immunization due        Relevant Orders    (In Office Administered) Pneumococcal Polysaccharide Vaccine (23 Valent) (SQ/IM) (Completed)            Plan:       AIDS (acquired immunodeficiency syndrome), CD4 >200 and <500  -     HIV-1 RNA, Quantitative, PCR with Reflex to Genotype; Future; Expected date: 10/26/2022  -     CD4 Lymphocytes; Future; Expected date: 10/26/2022  -     CBC Auto Differential; Future; Expected date: 10/26/2022  -     Comprehensive Metabolic Panel; Future; Expected date: 10/26/2022  -     Testosterone; Future; Expected date: 10/26/2022  -     Testosterone, Free Adult Male; Future; Expected date: 10/26/2022  -      TRIUMEQ 600- mg Tab; Take 1 tablet by mouth once daily.  Dispense: 30 tablet; Refill: 3  -     sulfamethoxazole-trimethoprim 800-160mg (BACTRIM DS) 800-160 mg Tab; Take 1 tablet by mouth once daily.  Dispense: 30 tablet; Refill: 3  Continue Triumeq 1 poq day and Tivicay 50 mg 1 po at bedtime. 2nd dose of Tivicay should be discontinued on 11/20/22 when MAC treatment is complete  Discussed HIV status at length to include need for 100% medication compliance. Counseled on medication adherence/resistance as well as importance of attending all scheduled follow up appointments/labs. Sexual protection with condoms recommended.  RTC 8 weeks with Franca for f/u, labs today  Fundus photo completed 1/31/22  STI screening done 10/26/22  COVID and flu precautions discussed   Vaccines recommended     Vitamin D deficiency  -     ergocalciferol (VITAMIN D2) 50,000 unit Cap; Take 1 capsule (50,000 Units total) by mouth every 7 days.  Dispense: 4 capsule; Refill: 3  Continue meds. Pt educated on importance of Vit D to bone and organ health.     Disseminated mycobacterium avium-intracellulare complex  Continue MAC treatment of Clarithromycin, Ethambutol, and Rifampin   Started 11/1/21.  AFB neg 11/19/21.  End date 11/20/22    History of syphilis  -     SYPHILIS ANTIBODY (WITH REFLEX RPR); Future; Expected date: 10/26/2022  Treated with Bicillin LA 2.4 million units x 1 3/1/19.  RPR titer at that time was 1:128.    History of Pneumocystis jirovecii pneumonia  OI prophy Bactrim DS 1 tab po q day    Elevated TSH  -     TSH; Future; Expected date: 10/26/2022  -     T4, Free; Future; Expected date: 10/26/2022    Routine screening for STI (sexually transmitted infection)  -     C.trach/N.gonor AMP RNA; Future; Expected date: 10/26/2022  -     Chlamydia/GC, PCR; Future; Expected date: 10/26/2022  -     Chlamydia/GC, PCR; Future; Expected date: 10/26/2022    Immunization due  -     (In Office Administered) Pneumococcal Polysaccharide  Vaccine (23 Valent) (SQ/IM); Future; Expected date: 10/26/2022

## 2022-10-26 NOTE — PROGRESS NOTES
Subjective:      Patient ID: Azael Hodge is a 32 y.o. male.    Chief Complaint:Follow-up (B20)      History of Present Illness  HPI     Follow-up     Additional comments: B20          Last edited by Rhonda Kruse LPN on 10/26/2022  9:01 AM.      {ID [facility] BLOCKS:77512}    ROS  Objective:   Physical Exam  Assessment:       No diagnosis found.      Plan:       ***

## 2022-10-27 LAB
AGE: >18
C TRACH RRNA SPEC QL NAA+PROBE: NEGATIVE
CD3+CD4+ CELLS # BLD: 52 % (ref 28–48)
CD3+CD4+ CELLS # SPEC: 286.42 UNIT/L (ref 589–1505)
CD3+CD4+ CELLS NFR BLD: 16.2 %
HIV1 RNA # PLAS NAA DL=20: <20 COPIES/ML
LYMPHOCYTES # BLD AUTO: 1768 X10(3)/MCL (ref 1260–5520)
LYMPHOMA - T-CELL MARKERS SPEC-IMP: ABNORMAL
N GONORRHOEA RRNA SPEC QL NAA+PROBE: NEGATIVE
RPR SER QL: NORMAL
RPR SER QL: NORMAL
RPR SER-TITR: NORMAL {TITER}
SPECIMEN SOURCE: NORMAL
SPECIMEN SOURCE: NORMAL
TESTOST FREE SERPL-MCNC: 94 PG/ML
WBC # BLD AUTO: 3400 /MM3 (ref 4500–11500)

## 2022-10-29 LAB — T PALLIDUM AB SER QL: REACTIVE

## 2022-11-21 DIAGNOSIS — B20 HUMAN IMMUNODEFICIENCY VIRUS (HIV) DISEASE: ICD-10-CM

## 2022-11-21 DIAGNOSIS — R59.1 LYMPHADENOPATHY: Primary | ICD-10-CM

## 2022-11-29 ENCOUNTER — HOSPITAL ENCOUNTER (OUTPATIENT)
Dept: RADIOLOGY | Facility: HOSPITAL | Age: 32
Discharge: HOME | End: 2022-11-29
Attending: INTERNAL MEDICINE
Payer: MEDICAID

## 2022-11-29 DIAGNOSIS — B20 HUMAN IMMUNODEFICIENCY VIRUS (HIV) DISEASE: ICD-10-CM

## 2022-11-29 DIAGNOSIS — R59.1 LYMPHADENOPATHY: ICD-10-CM

## 2022-11-29 PROCEDURE — 25500020 PHARM REV CODE 255: Mod: 59

## 2022-11-29 PROCEDURE — 25500020 PHARM REV CODE 255: Mod: 59 | Performed by: INTERNAL MEDICINE

## 2022-11-29 PROCEDURE — 71260 CT THORAX DX C+: CPT | Mod: TC

## 2022-11-29 PROCEDURE — 74177 CT ABD & PELVIS W/CONTRAST: CPT | Mod: TC

## 2022-11-29 RX ADMIN — DIATRIZOATE MEGLUMINE AND DIATRIZOATE SODIUM 30 ML: 660; 100 LIQUID ORAL; RECTAL at 11:11

## 2022-11-29 RX ADMIN — IOHEXOL 100 ML: 350 INJECTION, SOLUTION INTRAVENOUS at 11:11

## 2022-12-05 ENCOUNTER — OFFICE VISIT (OUTPATIENT)
Dept: OPHTHALMOLOGY | Facility: CLINIC | Age: 32
End: 2022-12-05
Payer: MEDICAID

## 2022-12-05 VITALS — HEIGHT: 66 IN | WEIGHT: 154.31 LBS | BODY MASS INDEX: 24.8 KG/M2

## 2022-12-05 DIAGNOSIS — D50.0 ANEMIA DUE TO CHRONIC BLOOD LOSS: ICD-10-CM

## 2022-12-05 DIAGNOSIS — B20 AIDS (ACQUIRED IMMUNODEFICIENCY SYNDROME), CD4 <=200: ICD-10-CM

## 2022-12-05 DIAGNOSIS — D72.819 LEUKOPENIA, UNSPECIFIED TYPE: ICD-10-CM

## 2022-12-05 DIAGNOSIS — R59.1 LYMPHADENOPATHY: Primary | ICD-10-CM

## 2022-12-05 DIAGNOSIS — R59.0 LOCALIZED ENLARGED LYMPH NODES: ICD-10-CM

## 2022-12-05 PROCEDURE — 99213 OFFICE O/P EST LOW 20 MIN: CPT | Mod: PBBFAC,PO | Performed by: STUDENT IN AN ORGANIZED HEALTH CARE EDUCATION/TRAINING PROGRAM

## 2022-12-05 RX ORDER — METHOCARBAMOL 750 MG/1
750 TABLET, FILM COATED ORAL 2 TIMES DAILY PRN
COMMUNITY
Start: 2022-10-28 | End: 2022-12-21

## 2022-12-05 NOTE — PROGRESS NOTES
HPI    New patient routine eye exam  Last edited by Rachell Walker RN on 12/5/2022  2:41 PM.            Assessment /Plan     For exam results, see Encounter Report.    AIDS (acquired immunodeficiency syndrome), CD4 <=200  -     Ambulatory referral/consult to Ophthalmology      HIV   - Last CD4 286   - No abnormality on eye exam   - Return precautions discussed                  RTC 6 months DFE

## 2022-12-06 ENCOUNTER — OFFICE VISIT (OUTPATIENT)
Dept: HEMATOLOGY/ONCOLOGY | Facility: CLINIC | Age: 32
End: 2022-12-06
Payer: MEDICAID

## 2022-12-06 VITALS
BODY MASS INDEX: 25.39 KG/M2 | WEIGHT: 158 LBS | SYSTOLIC BLOOD PRESSURE: 141 MMHG | DIASTOLIC BLOOD PRESSURE: 81 MMHG | HEART RATE: 72 BPM | HEIGHT: 66 IN | TEMPERATURE: 98 F | OXYGEN SATURATION: 100 %

## 2022-12-06 DIAGNOSIS — D72.819 LEUKOPENIA, UNSPECIFIED TYPE: Primary | ICD-10-CM

## 2022-12-06 DIAGNOSIS — R59.1 LYMPHADENOPATHY: ICD-10-CM

## 2022-12-06 DIAGNOSIS — R59.0 LOCALIZED ENLARGED LYMPH NODES: ICD-10-CM

## 2022-12-06 DIAGNOSIS — D50.0 ANEMIA DUE TO CHRONIC BLOOD LOSS: ICD-10-CM

## 2022-12-06 DIAGNOSIS — B20 HUMAN IMMUNODEFICIENCY VIRUS (HIV) DISEASE: ICD-10-CM

## 2022-12-06 PROCEDURE — 3077F PR MOST RECENT SYSTOLIC BLOOD PRESSURE >= 140 MM HG: ICD-10-PCS | Mod: CPTII,,, | Performed by: INTERNAL MEDICINE

## 2022-12-06 PROCEDURE — 99999 PR PBB SHADOW E&M-EST. PATIENT-LVL IV: ICD-10-PCS | Mod: PBBFAC,,, | Performed by: INTERNAL MEDICINE

## 2022-12-06 PROCEDURE — 3008F BODY MASS INDEX DOCD: CPT | Mod: CPTII,,, | Performed by: INTERNAL MEDICINE

## 2022-12-06 PROCEDURE — 1160F RVW MEDS BY RX/DR IN RCRD: CPT | Mod: CPTII,,, | Performed by: INTERNAL MEDICINE

## 2022-12-06 PROCEDURE — 3079F DIAST BP 80-89 MM HG: CPT | Mod: CPTII,,, | Performed by: INTERNAL MEDICINE

## 2022-12-06 PROCEDURE — 1159F PR MEDICATION LIST DOCUMENTED IN MEDICAL RECORD: ICD-10-PCS | Mod: CPTII,,, | Performed by: INTERNAL MEDICINE

## 2022-12-06 PROCEDURE — 1160F PR REVIEW ALL MEDS BY PRESCRIBER/CLIN PHARMACIST DOCUMENTED: ICD-10-PCS | Mod: CPTII,,, | Performed by: INTERNAL MEDICINE

## 2022-12-06 PROCEDURE — 3008F PR BODY MASS INDEX (BMI) DOCUMENTED: ICD-10-PCS | Mod: CPTII,,, | Performed by: INTERNAL MEDICINE

## 2022-12-06 PROCEDURE — 1159F MED LIST DOCD IN RCRD: CPT | Mod: CPTII,,, | Performed by: INTERNAL MEDICINE

## 2022-12-06 PROCEDURE — 99214 OFFICE O/P EST MOD 30 MIN: CPT | Mod: PBBFAC | Performed by: INTERNAL MEDICINE

## 2022-12-06 PROCEDURE — 99214 OFFICE O/P EST MOD 30 MIN: CPT | Mod: S$PBB,,, | Performed by: INTERNAL MEDICINE

## 2022-12-06 PROCEDURE — 3079F PR MOST RECENT DIASTOLIC BLOOD PRESSURE 80-89 MM HG: ICD-10-PCS | Mod: CPTII,,, | Performed by: INTERNAL MEDICINE

## 2022-12-06 PROCEDURE — 99214 PR OFFICE/OUTPT VISIT, EST, LEVL IV, 30-39 MIN: ICD-10-PCS | Mod: S$PBB,,, | Performed by: INTERNAL MEDICINE

## 2022-12-06 PROCEDURE — 99999 PR PBB SHADOW E&M-EST. PATIENT-LVL IV: CPT | Mod: PBBFAC,,, | Performed by: INTERNAL MEDICINE

## 2022-12-06 PROCEDURE — 3077F SYST BP >= 140 MM HG: CPT | Mod: CPTII,,, | Performed by: INTERNAL MEDICINE

## 2022-12-06 NOTE — PROGRESS NOTES
HEMATOLOGY/ONCOLOGY OFFICE CLINIC VISIT    Visit Information:    Initial Evaluation: 11/30/2021  Referring Provider: [facility]  Other providers:  Code status:    Diagnosis:  Lymphadenopathy  HIV +    Present treatment: N/A    Treatment/Oncology history: N/A    Plan: surveillance    Imaging:  CT angiogram 11/20/2021:No evidence of pulmonary thromboembolism. Enlarged mediastinal lymph nodes extending into the bilateral vj. On the right largest conglomeration of lymph nodes measures approximately 1.8 x 3.3 cm. This concepcion prominence is seen throughout the mediastinum as well as enlarged lymph node in the left neck measuring 1.3 cm. Pulmonary findings have resolved in the interval with mediastinal adenopathy now present. Findings are most likely reactive from recent infectious process, however recommend continued short interval follow-up to resolution to exclude lymphoma.   CT Thorax 1/19/2022: Clustered solid nodules within the posterior portion of the right lower lobe. Individual nodules measure up to 6 mm. Additional scattered small solid nodules within the remaining lobes, increased in number since prior study.  Right hilar lymph node measures 15 mm in short axis, stable. A left hilar lymph node also measures 15 mm in short axis, slightly smaller since n. Subcarinal lymph node measures 20 mm stable. No new lymphadenopathy appreciated.  Left lower cervical lymph node measuring 15 mm in short axis , not significantly changed. A right lower cervical lymph node measures 13 mm, stable. Retrocrural lymph nodes are mildly larger since prior exam. Reference right retrocrural lymph node now measures 12 mm, previously 8 mm. There is a partially visualized concepcion conglomerate between the left kidney and pancreas measuring about 50 mm in transverse diameter.    CT C/A/P 5/3/2022: Resolution of right lower lobe nodules seen on prior CT.  No enlarging pulmonary nodule or consolidation on current exam. Reference right hilar lymph  node measures 9 mm in short axis, decreased from 22 mm before.  Reference left hilar lymph node also smaller, measuring about 6 mm in short axis.  Subcarinal lymph node has also decreased in size, now measuring 7 mm in short axis compared to 25 mm before.  No new lymphadenopathy identified. Abdominal/pelvic lymph nodes: Significant improvement in gastrohepatic, retrocrural and retroperitoneal lymphadenopathy since November.  For reference, a left periaortic lymph node now measures 9 mm in short axis, previously 20 mm.  Impression: 1. Improved lymphadenopathy since prior exams.  No new pathologically enlarged lymph nodes identified. 2. Decreased number of pulmonary nodules since 01/19/2022.  CT C/A/P 11/29/22:  Impression: Scattered lymph nodes in the periaortic region, bilateral pelvis, and inguinal regions bilaterally which are centrally stable in the interval.  No intrathoracic lymphadenopathy or pulmonary nodules. Overall no interval change from the prior.        CLINICAL HISTORY:       Patient  Azael Hodge is a 32 y.o. male with multiple medical problems including HIV/AIDS d/c from the hospital 11/23/2021 and returned to the ER on 11/25/2021 with abd pain and lower back pain, also reports nausea and constipation, denies any vomiting. Pt is a poor historian and has difficulty describing what is wrong with him. Pt recently admitted for pneumonia. Pt denies any chest pain or SOB. He also presented  with fever.    CT angiogram 11/20/2021 done when patient presented with SOB on previous hospitalization showed enlarged mediastinal lymph nodes extending into the bilateral vj. On the right largest conglomeration of lymph nodes measures approximately 1.8 x 3.3 cm. This concepcion prominence is seen throughout the mediastinum as well as enlarged lymph node in the left neck measuring 1.3 cm. No pulmonary embolism.    Upon evaluation at the ER, CT A/P 11/25/2021: There are multiple enlarged discrete lymph nodes at the  juan antonio hepatis, celiac region, root of the mesentery, retrocrural space and retroperitoneum, the largest measures approximately 3.7 cm in the left paraaortic region. Consider possible lymphoma. There is trace pericholecystic fluid. No radiodense gallstone or wall edema is seen. Considerations include early acute cholecystitis and reaction to adjacent hepatitis. Ultrasound may be helpful for more definitive characterization if needed. Mild hepatomegaly. There is some concentric mural thickening of the anorectum which may represent proctitis.     Of note CT C/A/P 10/19/2021 with Findings consistent with bilateral atypical pneumonia. Lymph nodes: There are scattered reactive-sized mediastinal lymph nodes. No pathologic concepcion enlargement or necrotic adenopathy. Otherwise, no evidence of acute or suspicious focal abnormality within the chest, abdomen, or pelvis. Incidentally noted gallbladder sludge without calcified stone or biliary obstruction.    Blood work with normal WBCs, he was hyponatremic with a sodium of 122, acidotic with a CO2 of 15, lactic acid 1 and lipase 18.  He was tachycardic but normotensive.  UA with trace of leukocyte esterase, 2+ protein, 1+ glucose and 1+ bilirubin.  Covid rapid test was negative.  CD4 on 11/18/2021 was 101.  Patient has been followed by infectious disease services.    We were consulted to evaluate for possible lymphoma.  Patient is seen in rounds and he is laying in bed.  He is by himself.  He looks chronically ill.  Abdomen is distended but he reports that pain is much improved. Patient had 1 unit of blood transfusion on 11/28/2021 for a hemoglobin of 7.6.  Today hemoglobin 9.3.  Hyponatremia improved.  Today sodium 134, CO2 20.     He has been afebrile for 24 Hours now.        Chief Complaint: No Concerns today      Interval History:  Patient presents today for follow up to discuss CT scan results. He is doing well. No complaints today. No fever, chills or sweats. No bleeding. No  chest pain or shortness of breath.   CT scan discussed in detail with him.      Past Medical History:   Diagnosis Date    ADHD (attention deficit hyperactivity disorder)     Ages 5 through 13.  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     Pneumonia, unspecified organism     Syphilis, unspecified       History reviewed. No pertinent surgical history.  Family History   Problem Relation Age of Onset    Hypertension Mother     Migraines Father     Hypertension Father     Anxiety disorder Father     Diabetes Brother     Hypertension Brother      Social Connections: Not on file       Review of patient's allergies indicates:   Allergen Reactions    Banana      Other reaction(s): Pharyngeal edema    Benzodiazepines Hallucinations      Current Outpatient Medications on File Prior to Visit   Medication Sig Dispense Refill    clarithromycin (BIAXIN) 500 MG tablet Take 1 tablet (500 mg total) by mouth 2 (two) times a day. 60 tablet 3    ergocalciferol (VITAMIN D2) 50,000 unit Cap Take 1 capsule (50,000 Units total) by mouth every 7 days. 4 capsule 3    ethambutoL (MYAMBUTOL) 400 MG Tab Take 2 tablets (800 mg total) by mouth once daily. 30 tablet 3    methocarbamoL (ROBAXIN) 750 MG Tab Take 750 mg by mouth 2 (two) times daily as needed.      rifAMpin (RIFADIN) 300 MG capsule Take 1 capsule (300 mg total) by mouth 2 (two) times a day. Once a day 60 capsule 3    sulfamethoxazole-trimethoprim 800-160mg (BACTRIM DS) 800-160 mg Tab Take 1 tablet by mouth once daily. 30 tablet 3    TRIUMEQ 600- mg Tab Take 1 tablet by mouth once daily. 30 tablet 3    dolutegravir (TIVICAY) 50 mg Tab Take 1 tablet (50 mg total) by mouth once daily. (Patient not taking: Reported on 10/26/2022) 60 tablet 3     No current facility-administered medications on file prior to visit.      Review of Systems   Constitutional:  Negative for activity change, appetite change, chills, diaphoresis, fatigue, fever and unexpected weight change.   HENT:  Negative for  "nasal congestion, mouth sores, nosebleeds, postnasal drip, sinus pressure/congestion, sore throat and trouble swallowing.    Eyes:  Negative for visual disturbance.   Respiratory:  Negative for cough and shortness of breath.    Cardiovascular:  Negative for chest pain and palpitations.   Gastrointestinal:  Negative for abdominal distention, abdominal pain, blood in stool, change in bowel habit, constipation, diarrhea, nausea, vomiting and change in bowel habit.   Endocrine: Negative.    Genitourinary:  Negative for bladder incontinence, decreased urine volume, difficulty urinating, dysuria, frequency, hematuria, scrotal swelling, testicular pain and urgency.   Musculoskeletal:  Negative for arthralgias, back pain, gait problem, joint swelling, leg pain, myalgias and neck pain.   Integumentary:  Negative for rash.   Neurological:  Negative for dizziness, tremors, seizures, syncope, speech difficulty, weakness, light-headedness, numbness, headaches and memory loss.   Hematological:  Negative for adenopathy. Does not bruise/bleed easily.   Psychiatric/Behavioral:  Negative for agitation, confusion, hallucinations, sleep disturbance and suicidal ideas. The patient is not nervous/anxious.             Vitals:    12/06/22 0856   BP: (!) 141/81   BP Location: Left arm   Patient Position: Sitting   Pulse: 72   Temp: 98.4 °F (36.9 °C)   TempSrc: Oral   SpO2: 100%   Weight: 71.7 kg (158 lb)   Height: 5' 6" (1.676 m)        Physical Exam  Vitals and nursing note reviewed.   Constitutional:       General: He is not in acute distress.     Appearance: Normal appearance.   HENT:      Head: Normocephalic and atraumatic.      Mouth/Throat:      Mouth: Mucous membranes are moist.   Eyes:      General: No scleral icterus.     Extraocular Movements: Extraocular movements intact.      Conjunctiva/sclera: Conjunctivae normal.   Neck:      Vascular: No JVD.   Cardiovascular:      Rate and Rhythm: Normal rate and regular rhythm.      Heart " sounds: No murmur heard.  Pulmonary:      Effort: Pulmonary effort is normal.      Breath sounds: Normal breath sounds. No wheezing or rhonchi.   Chest:      Chest wall: No tenderness.   Abdominal:      General: Bowel sounds are normal. There is no distension.      Palpations: Abdomen is soft.      Tenderness: There is no abdominal tenderness.   Musculoskeletal:         General: No swelling or deformity.      Cervical back: Neck supple.   Lymphadenopathy:      Cervical: No cervical adenopathy.      Upper Body:      Right upper body: No supraclavicular or axillary adenopathy.      Left upper body: No supraclavicular or axillary adenopathy.      Lower Body: No right inguinal adenopathy. No left inguinal adenopathy.   Skin:     General: Skin is warm.      Coloration: Skin is not jaundiced.      Findings: No rash.   Neurological:      General: No focal deficit present.      Mental Status: He is alert and oriented to person, place, and time.   Psychiatric:         Attention and Perception: Attention normal.         Mood and Affect: Mood and affect normal.         Behavior: Behavior is cooperative.         Cognition and Memory: Cognition normal.         Judgment: Judgment normal.     ECOG SCORE             Laboratory:  CBC with Differential:  @BGEZCCUNL30(WBC,NEUTROPCT,LYMPH,MONOPCT,EOSPCT,BASOPHIL,RBC,HCT,HGB,MCV,MCH,MCMC,RDW,PLT,MPV)@  CMP:  @[id number](Glu,Calcium,Albumin,PROT,NA,K,CO2,CL,BUN,Creatinine,Alkphos,ALT,AST,Bilitot)@  BMP: @IREMWAEFZ89(GLU,Calcium,NA,K,CO2,CL,BUN,Creatinine)@  LFTs: @[id number](ALT,AST,ALKPHOS,BILITOT,PROT,ALBUMIN)@  Haptoglobin: @DDBMOKYBI15(HAPTOGLOBIN)@  Tumor Markers: @[id number](PSA,CEA,,ALGTM,AFPTM,NO4894,)@  Immunology: @QKULDQIIM82(SPEP,CYNDIE,AURORA,FREELAMBDALI)@  Coagulation: @PZAKFCAXW86(PT,INR,APTT)@  Specimen (24h ago, onward)      None          Microbiology Results (last 7 days)       ** No results found for the last 168 hours. **              Imaging:  CT angiogram  11/20/2021:  PULMONARY ARTERY:No evidence of pulmonary thromboembolism.  AORTA: Normal in course and caliber.  THYROID GLAND: The visualized thyroid is unremarkable.  AIRWAYS: Trachea is midline and tracheobronchial tree is patent.   HEART: The heart is normal in size. There is no pericardial effusion.   LUNGS: There are no new masses or nodules identified. No pleural effusion or pneumothorax.  LYMPH NODES: Enlarged mediastinal lymph nodes extending into the bilateral vj. On the right largest conglomeration of lymph nodes measures approximately 1.8 x 3.3 cm. This concepcion prominence is seen throughout the mediastinum as well as enlarged lymph node in the left neck measuring 1.3 cm (series 4 image 13).  BONES: No displaced fracture. No aggressive appearing osseous lesion identified.  UPPER ABDOMEN: The visualized abdomen is unremarkable.  Impression  No evidence of pulmonary thromboembolism.      Pulmonary findings have resolved in the interval with mediastinal adenopathy now present. Findings are most likely reactive from recent infectious process, however recommend continued short interval follow-up to resolution to exclude lymphoma.    1. Mediastinal, hilar and lower cervical lymphadenopathy with stable overall appearance since November. However, retrocrural lymph nodes are mildly larger with suspected mild enlargement of a partially visualized concepcion conglomerate between the left kidney and pancreas.  2. Increased number of subcentimeter pulmonary nodules since prior CT including clustered nodules in the posterior right lower lobe. These are likely infectious or inflammatory in nature. 2-3 month follow-up chest CT suggested to confirm resolution.       Assessment:       HIV/AIDS  Lymphadenopathy--no palpable lymph nodes. If need biopsy need to be from a larger LN (> 1 cm)--most likely reactive, benign  Anemia--stable  Leukopenia--stable  Abdominal pain--resolved  Recent PJP pneumonia, Syphilis and disseminated MAC    Pulmonary nodule--Increase in number but very small--subcm--> Infectious??--> resolved        Plan:       Patient with history of HIV/AIDS presented with abdominal pain, fever, and lymphadenopathy.  Recently discharged from the hospital for PJP pneumonia and disseminated MAC.  Is currently on rifampin, ethambutol and clarithromycin.  He is also on ART therapy for HIV that was restarted on 10/11/2021.  He is also on dolutegravir since he started Rifampin.  He completed treatment course of Bactrim for his PJP on 11/8/2021.  Blood cultures negative.  He did have blood cultures positive for MAC during the 10/19/2021 hospitalization.  He is on so on prednisone that he will need to continue for at least 2 more weeks.    Patient lymphadenopathy is relatively acute as this lymphadenopathy was not as prominent back in October of this year.  Most likely related to his disease/infection but lymphoma is on the differential. Patient reluctant to undergo any type of procedure, excisional lymph node biopsy, at this time.  He would like to give time to the medications to work and reevaluate in couple of months.  Most recent CT with resolution of pulmonary nodules and significant improvement of his lymphadenopathy.  Again this most likely secondary to his HIV rather than to lymphoma or any other malignancy. LAD reactive, benign We will continue to follow for now.    If LAD worsen he will need Excisional LN biopsy to r/o AIDS related lymphoma  Cont ART Rx as per ID service  RTC in 6 months with labs and after CT's  Encouraged to call for any questions or problems.  The patient was given ample opportunity to ask questions and they were all answered to satisfaction; patient demonstrated understanding of what we discussed and is agreeable to the plan.    AYANA QUARLES MD      Professional Services   I, Valorie Collins LPN, acted solely as a scribe for and in the presence of Dr. Ayana Quarles, who performed these services.

## 2022-12-20 ENCOUNTER — TELEPHONE (OUTPATIENT)
Dept: INTERNAL MEDICINE | Facility: CLINIC | Age: 32
End: 2022-12-20
Payer: MEDICAID

## 2022-12-20 DIAGNOSIS — Z00.00 WELLNESS EXAMINATION: Primary | ICD-10-CM

## 2022-12-20 NOTE — TELEPHONE ENCOUNTER
Please inform the patient that our appt that was scheduled for next week will not be needed anymore; it was to check his thyroid levels out but it looks like laureano checked them back in October and they had returned back to normal which is great.    We can reschedule the pt for a routine f/u with me in June 2023 with [facility] to match.    Thanks      KASIA Rangel

## 2022-12-21 ENCOUNTER — OFFICE VISIT (OUTPATIENT)
Dept: INFECTIOUS DISEASES | Facility: CLINIC | Age: 32
End: 2022-12-21
Payer: MEDICAID

## 2022-12-21 ENCOUNTER — TELEPHONE (OUTPATIENT)
Dept: INFECTIOUS DISEASES | Facility: CLINIC | Age: 32
End: 2022-12-21

## 2022-12-21 VITALS
SYSTOLIC BLOOD PRESSURE: 122 MMHG | WEIGHT: 157.63 LBS | DIASTOLIC BLOOD PRESSURE: 77 MMHG | BODY MASS INDEX: 25.33 KG/M2 | TEMPERATURE: 98 F | HEIGHT: 66 IN | HEART RATE: 73 BPM | RESPIRATION RATE: 14 BRPM

## 2022-12-21 DIAGNOSIS — Z86.19 HISTORY OF MYCOBACTERIUM AVIUM COMPLEX INFECTION: ICD-10-CM

## 2022-12-21 DIAGNOSIS — Z86.19 HISTORY OF PNEUMOCYSTIS JIROVECII PNEUMONIA: ICD-10-CM

## 2022-12-21 DIAGNOSIS — B20 AIDS (ACQUIRED IMMUNODEFICIENCY SYNDROME), CD4 >200 AND <500: Primary | ICD-10-CM

## 2022-12-21 DIAGNOSIS — E55.9 VITAMIN D DEFICIENCY: ICD-10-CM

## 2022-12-21 DIAGNOSIS — N28.9 RENAL INSUFFICIENCY: ICD-10-CM

## 2022-12-21 DIAGNOSIS — Z86.19 HISTORY OF SYPHILIS: ICD-10-CM

## 2022-12-21 LAB
ALBUMIN SERPL-MCNC: 4.4 G/DL (ref 3.5–5)
ALBUMIN/GLOB SERPL: 1.1 RATIO (ref 1.1–2)
ALP SERPL-CCNC: 96 UNIT/L (ref 40–150)
ALT SERPL-CCNC: 20 UNIT/L (ref 0–55)
APPEARANCE UR: CLEAR
AST SERPL-CCNC: 27 UNIT/L (ref 5–34)
BACTERIA #/AREA URNS AUTO: ABNORMAL /HPF
BASOPHILS # BLD AUTO: 0.04 X10(3)/MCL (ref 0–0.2)
BASOPHILS NFR BLD AUTO: 1.2 %
BILIRUB UR QL STRIP.AUTO: NEGATIVE MG/DL
BILIRUBIN DIRECT+TOT PNL SERPL-MCNC: 0.8 MG/DL
BUN SERPL-MCNC: 8.2 MG/DL (ref 8.9–20.6)
CALCIUM SERPL-MCNC: 10.2 MG/DL (ref 8.4–10.2)
CHLORIDE SERPL-SCNC: 105 MMOL/L (ref 98–107)
CHOLEST SERPL-MCNC: 194 MG/DL
CHOLEST/HDLC SERPL: 5 {RATIO} (ref 0–5)
CO2 SERPL-SCNC: 26 MMOL/L (ref 22–29)
COLOR UR AUTO: YELLOW
CREAT SERPL-MCNC: 1.44 MG/DL (ref 0.73–1.18)
DEPRECATED CALCIDIOL+CALCIFEROL SERPL-MC: 16.9 NG/ML (ref 30–80)
EOSINOPHIL # BLD AUTO: 0.28 X10(3)/MCL (ref 0–0.9)
EOSINOPHIL NFR BLD AUTO: 8.3 %
ERYTHROCYTE [DISTWIDTH] IN BLOOD BY AUTOMATED COUNT: 12.6 % (ref 11.6–14.4)
GFR SERPLBLD CREATININE-BSD FMLA CKD-EPI: >60 MLS/MIN/1.73/M2
GLOBULIN SER-MCNC: 4.1 GM/DL (ref 2.4–3.5)
GLUCOSE SERPL-MCNC: 92 MG/DL (ref 74–100)
GLUCOSE UR QL STRIP.AUTO: NORMAL MG/DL
HCT VFR BLD AUTO: 46 % (ref 42–52)
HCV AB SERPL QL IA: NONREACTIVE
HDLC SERPL-MCNC: 38 MG/DL (ref 35–60)
HGB BLD-MCNC: 15.4 GM/DL (ref 14–18)
HYALINE CASTS #/AREA URNS LPF: ABNORMAL /LPF
IMM GRANULOCYTES # BLD AUTO: 0 X10(3)/MCL (ref 0–0.04)
IMM GRANULOCYTES NFR BLD AUTO: 0 %
KETONES UR QL STRIP.AUTO: NEGATIVE MG/DL
LDLC SERPL CALC-MCNC: 117 MG/DL (ref 50–140)
LEUKOCYTE ESTERASE UR QL STRIP.AUTO: NEGATIVE UNIT/L
LYMPHOCYTES # BLD AUTO: 1.74 X10(3)/MCL (ref 0.6–4.6)
LYMPHOCYTES NFR BLD AUTO: 51.5 %
MCH RBC QN AUTO: 30.6 PG
MCHC RBC AUTO-ENTMCNC: 33.5 MG/DL (ref 33–36)
MCV RBC AUTO: 91.5 FL (ref 80–94)
MONOCYTES # BLD AUTO: 0.31 X10(3)/MCL (ref 0.1–1.3)
MONOCYTES NFR BLD AUTO: 9.2 %
MUCOUS THREADS URNS QL MICRO: ABNORMAL /LPF
NEUTROPHILS # BLD AUTO: 1.01 X10(3)/MCL (ref 2.1–9.2)
NEUTROPHILS NFR BLD AUTO: 29.8 %
NITRITE UR QL STRIP.AUTO: NEGATIVE
NRBC BLD AUTO-RTO: 0 % (ref 0–1)
PH UR STRIP.AUTO: 5.5 [PH]
PLATELET # BLD AUTO: 278 X10(3)/MCL (ref 140–371)
PMV BLD AUTO: 9.4 FL (ref 9.4–12.4)
POTASSIUM SERPL-SCNC: 4.6 MMOL/L (ref 3.5–5.1)
PROT SERPL-MCNC: 8.5 GM/DL (ref 6.4–8.3)
PROT UR QL STRIP.AUTO: ABNORMAL MG/DL
RBC # BLD AUTO: 5.03 X10(6)/MCL (ref 4.7–6.1)
RBC #/AREA URNS AUTO: ABNORMAL /HPF
RBC UR QL AUTO: NEGATIVE UNIT/L
SODIUM SERPL-SCNC: 140 MMOL/L (ref 136–145)
SP GR UR STRIP.AUTO: 1.03
SQUAMOUS #/AREA URNS LPF: ABNORMAL /HPF
T PALLIDUM AB SER QL: REACTIVE
TRIGL SERPL-MCNC: 196 MG/DL (ref 34–140)
UROBILINOGEN UR STRIP-ACNC: ABNORMAL MG/DL
VLDLC SERPL CALC-MCNC: 39 MG/DL
WBC # SPEC AUTO: 3.4 X10(3)/MCL (ref 4.5–11.5)
WBC #/AREA URNS AUTO: ABNORMAL /HPF

## 2022-12-21 PROCEDURE — 99214 OFFICE O/P EST MOD 30 MIN: CPT | Mod: S$PBB,,, | Performed by: NURSE PRACTITIONER

## 2022-12-21 PROCEDURE — 80053 COMPREHEN METABOLIC PANEL: CPT | Performed by: NURSE PRACTITIONER

## 2022-12-21 PROCEDURE — 87536 HIV-1 QUANT&REVRSE TRNSCRPJ: CPT | Performed by: NURSE PRACTITIONER

## 2022-12-21 PROCEDURE — 36415 COLL VENOUS BLD VENIPUNCTURE: CPT | Performed by: NURSE PRACTITIONER

## 2022-12-21 PROCEDURE — 3008F PR BODY MASS INDEX (BMI) DOCUMENTED: ICD-10-PCS | Mod: CPTII,,, | Performed by: NURSE PRACTITIONER

## 2022-12-21 PROCEDURE — 1160F PR REVIEW ALL MEDS BY PRESCRIBER/CLIN PHARMACIST DOCUMENTED: ICD-10-PCS | Mod: CPTII,,, | Performed by: NURSE PRACTITIONER

## 2022-12-21 PROCEDURE — 86592 SYPHILIS TEST NON-TREP QUAL: CPT | Performed by: NURSE PRACTITIONER

## 2022-12-21 PROCEDURE — 81001 URINALYSIS AUTO W/SCOPE: CPT | Performed by: NURSE PRACTITIONER

## 2022-12-21 PROCEDURE — 3078F PR MOST RECENT DIASTOLIC BLOOD PRESSURE < 80 MM HG: ICD-10-PCS | Mod: CPTII,,, | Performed by: NURSE PRACTITIONER

## 2022-12-21 PROCEDURE — 86803 HEPATITIS C AB TEST: CPT | Performed by: NURSE PRACTITIONER

## 2022-12-21 PROCEDURE — 1160F RVW MEDS BY RX/DR IN RCRD: CPT | Mod: CPTII,,, | Performed by: NURSE PRACTITIONER

## 2022-12-21 PROCEDURE — 1159F MED LIST DOCD IN RCRD: CPT | Mod: CPTII,,, | Performed by: NURSE PRACTITIONER

## 2022-12-21 PROCEDURE — 80061 LIPID PANEL: CPT | Performed by: NURSE PRACTITIONER

## 2022-12-21 PROCEDURE — 86480 TB TEST CELL IMMUN MEASURE: CPT | Performed by: NURSE PRACTITIONER

## 2022-12-21 PROCEDURE — 82306 VITAMIN D 25 HYDROXY: CPT | Performed by: NURSE PRACTITIONER

## 2022-12-21 PROCEDURE — 85025 COMPLETE CBC W/AUTO DIFF WBC: CPT | Performed by: NURSE PRACTITIONER

## 2022-12-21 PROCEDURE — 1159F PR MEDICATION LIST DOCUMENTED IN MEDICAL RECORD: ICD-10-PCS | Mod: CPTII,,, | Performed by: NURSE PRACTITIONER

## 2022-12-21 PROCEDURE — 99213 OFFICE O/P EST LOW 20 MIN: CPT | Mod: PBBFAC | Performed by: NURSE PRACTITIONER

## 2022-12-21 PROCEDURE — 3074F PR MOST RECENT SYSTOLIC BLOOD PRESSURE < 130 MM HG: ICD-10-PCS | Mod: CPTII,,, | Performed by: NURSE PRACTITIONER

## 2022-12-21 PROCEDURE — 3074F SYST BP LT 130 MM HG: CPT | Mod: CPTII,,, | Performed by: NURSE PRACTITIONER

## 2022-12-21 PROCEDURE — 3078F DIAST BP <80 MM HG: CPT | Mod: CPTII,,, | Performed by: NURSE PRACTITIONER

## 2022-12-21 PROCEDURE — 99214 PR OFFICE/OUTPT VISIT, EST, LEVL IV, 30-39 MIN: ICD-10-PCS | Mod: S$PBB,,, | Performed by: NURSE PRACTITIONER

## 2022-12-21 PROCEDURE — 86780 TREPONEMA PALLIDUM: CPT | Performed by: NURSE PRACTITIONER

## 2022-12-21 PROCEDURE — 3008F BODY MASS INDEX DOCD: CPT | Mod: CPTII,,, | Performed by: NURSE PRACTITIONER

## 2022-12-21 PROCEDURE — 86361 T CELL ABSOLUTE COUNT: CPT | Performed by: NURSE PRACTITIONER

## 2022-12-21 RX ORDER — ABACAVIR SULFATE, DOLUTEGRAVIR SODIUM, LAMIVUDINE 600; 50; 300 MG/1; MG/1; MG/1
1 TABLET, FILM COATED ORAL DAILY
Qty: 90 TABLET | Refills: 0 | Status: SHIPPED | OUTPATIENT
Start: 2022-12-21 | End: 2023-01-26 | Stop reason: SDUPTHER

## 2022-12-21 RX ORDER — SULFAMETHOXAZOLE AND TRIMETHOPRIM 800; 160 MG/1; MG/1
1 TABLET ORAL DAILY
Qty: 90 TABLET | Refills: 0 | Status: SHIPPED | OUTPATIENT
Start: 2022-12-21 | End: 2023-03-28

## 2022-12-21 RX ORDER — ERGOCALCIFEROL 1.25 MG/1
50000 CAPSULE ORAL
Qty: 12 CAPSULE | Refills: 1 | Status: SHIPPED | OUTPATIENT
Start: 2022-12-21 | End: 2023-03-28 | Stop reason: SDUPTHER

## 2022-12-21 NOTE — PROGRESS NOTES
Reviewed labs. Creatinine is abnormal at 1.44 Please contact patient with results and encourage patient to increase their intake of water and decrease their intake of protein by decreasing meat, beans, nuts, milk and cheese consumption. Also advise patient to avoid NSAIDS such as Aleve, Ibuprofen, Mobic, etc.     Vit D level is low at 16.9. It was previously 29.9. Please make sure patient is taking his Vit D supplement weekly as directed.    Triglycerides are elevated.  Pt will need to follow a low fat, low sugar diet.

## 2022-12-21 NOTE — TELEPHONE ENCOUNTER
Attempted to contact pt no answer unable to leave message mailbox was full. Second number in chart I am unable to reach anyone it goes to a angela tone as phone would be cut off.

## 2022-12-21 NOTE — TELEPHONE ENCOUNTER
----- Message from KASIA Staley sent at 12/21/2022  1:29 PM CST -----  Reviewed labs. Creatinine is abnormal at 1.44 Please contact patient with results and encourage patient to increase their intake of water and decrease their intake of protein by decreasing meat, beans, nuts, milk and cheese consumption. Also advise patient to avoid NSAIDS such as Aleve, Ibuprofen, Mobic, etc.     Vit D level is low at 16.9. It was previously 29.9. Please make sure patient is taking his Vit D supplement weekly as directed.    Triglycerides are elevated.  Pt will need to follow a low fat, low sugar diet.

## 2022-12-21 NOTE — PROGRESS NOTES
Subjective:       Patient ID: Azael Hodge is a 32 y.o. male.    Chief Complaint: Follow-up (B20)    Azael is a 33 y/o AAM here for AIDS f/u. MSM. Dx 3/7/16. HLA  negative. Hospitalized from 10/19/21-11/9/21 with a diagnosis of AIDS with PJP and Disseminated MAC. Treated inpatient for PJP. Then placed on Bactrim DS 1 po q day. Pt reports continued adherence. AFB ordered 10/19/21 resulted as + AFB MAC on 11/1/21. Pt was started on Clarithromycin, Ethambutol, and Rifampin 11/1/21. 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 Repeat AFB done 11/19/21 neg. Pt is completed MAC treatment 11/19/22. He reports adherence to Triumeq 1 po  q day and Bactrim DS 1 po q day as directed. He is tolerating the meds well. Denies fever, headache, chills, visual problems, N/V/D, SOB, cough, chest pain or abd pain. Reviewed labs from 10/26/22 HIV VL <20, CD4 286, TSH 2.9131. Additional labs done 7/26/22 HIV VL <20, Cd4 216, Vit D 29.9, TSH 6.15, syphilis AB +, RPR NR.  Hx of syphilis. Treated with Bicillin LA 2.4 million units x 1 3/1/19.  RPR titer at that time was 1:128. Pt reports adherence to Ergocalciferol 50,000 units once weekly.  He is followed by Dr. Cage for mediastinal lymphadenopathy that was noted during hospitalization 11/20/21-11/30/21.  CT thorax/abd/pelvis done 4/28/22 showed improvement of lymphadenopathy. Pt attended eye clinic 12/5/22. He is due for a COVID and flu vaccine, but is not amenable at this time.       I spent a total of 30 minutes on the day of the visit.This includes face to face time and non-face to face time preparing to see the patient (eg, review of tests), obtaining and/or reviewing separately obtained history, documenting clinical information in the electronic or other health record, independently interpreting results and communicating results to the patient/family/caregiver, or care coordinator.    Review of Systems   Constitutional: Negative.     HENT: Negative.     Eyes: Negative.    Respiratory: Negative.     Cardiovascular: Negative.    Gastrointestinal: Negative.    Genitourinary: Negative.    Musculoskeletal: Negative.    Integumentary:  Negative.   Neurological: Negative.    Psychiatric/Behavioral: Negative.         Objective:      Physical Exam  Vitals reviewed.   Constitutional:       General: He is not in acute distress.     Appearance: Normal appearance.   HENT:      Head: Normocephalic.      Right Ear: External ear normal.      Left Ear: External ear normal.      Nose: Nose normal.      Mouth/Throat:      Mouth: Mucous membranes are moist.      Pharynx: Oropharynx is clear.   Eyes:      General: No scleral icterus.     Extraocular Movements: Extraocular movements intact.      Conjunctiva/sclera: Conjunctivae normal.      Pupils: Pupils are equal, round, and reactive to light.   Cardiovascular:      Rate and Rhythm: Normal rate and regular rhythm.      Pulses: Normal pulses.      Heart sounds: Normal heart sounds.   Pulmonary:      Effort: Pulmonary effort is normal. No respiratory distress.      Breath sounds: Normal breath sounds.   Abdominal:      General: Bowel sounds are normal. There is no distension.      Palpations: Abdomen is soft. There is no mass.      Tenderness: There is no abdominal tenderness. There is no right CVA tenderness or left CVA tenderness.      Hernia: No hernia is present.   Musculoskeletal:         General: No tenderness or signs of injury. Normal range of motion.      Cervical back: Normal range of motion and neck supple.      Right lower leg: No edema.      Left lower leg: No edema.   Lymphadenopathy:      Cervical: No cervical adenopathy.   Skin:     General: Skin is warm and dry.      Capillary Refill: Capillary refill takes less than 2 seconds.      Findings: No erythema or lesion.   Neurological:      General: No focal deficit present.      Mental Status: He is alert and oriented to person, place, and time. Mental  status is at baseline.   Psychiatric:         Mood and Affect: Mood normal.         Behavior: Behavior normal.         Thought Content: Thought content normal.         Judgment: Judgment normal.       Assessment:       Problem List Items Addressed This Visit    None  Visit Diagnoses       AIDS (acquired immunodeficiency syndrome), CD4 >200 and <500    -  Primary    Relevant Medications    sulfamethoxazole-trimethoprim 800-160mg (BACTRIM DS) 800-160 mg Tab    TRIUMEQ 600- mg Tab    Other Relevant Orders    HIV-1 RNA, Quantitative, PCR with Reflex to Genotype    CD4 Lymphocytes    CBC Auto Differential    Comprehensive Metabolic Panel    Lipid Panel    Quantiferon Gold TB    Urinalysis    Hepatitis C Antibody    Renal insufficiency        Vitamin D deficiency        Relevant Medications    ergocalciferol (VITAMIN D2) 50,000 unit Cap    Other Relevant Orders    Vitamin D    History of syphilis        Relevant Orders    SYPHILIS ANTIBODY (WITH REFLEX RPR)    History of Pneumocystis jirovecii pneumonia        History of Mycobacterium avium complex infection                Plan:       AIDS (acquired immunodeficiency syndrome), CD4 >200 and <500  -     sulfamethoxazole-trimethoprim 800-160mg (BACTRIM DS) 800-160 mg Tab; Take 1 tablet by mouth once daily.  Dispense: 90 tablet; Refill: 0  -     TRIUMEQ 600- mg Tab; Take 1 tablet by mouth once daily.  Dispense: 90 tablet; Refill: 0  -     HIV-1 RNA, Quantitative, PCR with Reflex to Genotype; Future; Expected date: 12/21/2022  -     CD4 Lymphocytes; Future; Expected date: 12/21/2022  -     CBC Auto Differential; Future; Expected date: 12/21/2022  -     Comprehensive Metabolic Panel; Future; Expected date: 12/21/2022  -     Lipid Panel; Future; Expected date: 12/21/2022  -     Quantiferon Gold TB; Future; Expected date: 12/21/2022  -     Urinalysis; Future; Expected date: 12/21/2022  -     Hepatitis C Antibody; Future; Expected date: 12/21/2022  Continue Triumeq 1  poq day   Discussed HIV status at length to include need for 100% medication compliance. Counseled on medication adherence/resistance as well as importance of attending all scheduled follow up appointments/labs. Sexual protection with condoms recommended.  RTC 3 months with Franca for f/u, labs today  Fundus photo completed 12/5/22.  STI screening done 10/26/22  COVID and flu precautions discussed   Vaccines recommended. Pt is not amenable at this time.    Renal insufficiency  Keep hydrated.  Avoid NSAIDs (ibuprofen, naproxen, aleve, advil, toradol or mobic)  Keep BP (goal <130/80) and cholesterol (LDL <100) within recommended goals  Low sodium diet encouraged; 2 grams per day  Increase exercise activity; 30 minutes 3-5 x per week   Maintain a healthy weight  Smoking cessation encouraged   Decrease ETOH intake/encouraged cessation   Medication compliance stressed   Keep all follow up appointments as scheduled    Vitamin D deficiency  -     ergocalciferol (VITAMIN D2) 50,000 unit Cap; Take 1 capsule (50,000 Units total) by mouth every 7 days.  Dispense: 12 capsule; Refill: 1  -     Vitamin D; Future; Expected date: 12/21/2022  Continue meds. Pt educated on importance of Vit D to bone and organ health.     History of syphilis  -     SYPHILIS ANTIBODY (WITH REFLEX RPR); Future; Expected date: 12/21/2022  Treated with Bicillin LA 2.4 million units x 1 3/1/19.  RPR titer at that time was 1:128.    History of Pneumocystis jirovecii pneumonia  OI prophy Bactrim DS 1 tab po q day    History of Mycobacterium avium complex infection  Completed MAC treatment of Clarithromycin, Ethambutol, and Rifampin 11/20/21. Started 11/1/21. AFB neg 11/19/21.

## 2022-12-21 NOTE — TELEPHONE ENCOUNTER
Called pt for virtual visit, no answer left message for pt to return call and sent message through portal.

## 2022-12-22 LAB
AGE: 33
CD3+CD4+ CELLS # BLD: 52 % (ref 28–48)
CD3+CD4+ CELLS # SPEC: 396.03 UNIT/L (ref 589–1505)
CD3+CD4+ CELLS NFR BLD: 22.4 %
HIV1 RNA # PLAS NAA DL=20: NORMAL COPIES/ML
LYMPHOCYTES # BLD AUTO: 1768 X10(3)/MCL (ref 1260–5520)
LYMPHOMA - T-CELL MARKERS SPEC-IMP: ABNORMAL
RPR SER QL: REACTIVE
RPR SER-TITR: ABNORMAL {TITER}
WBC # BLD AUTO: 3400 /MM3 (ref 4500–11500)

## 2022-12-22 NOTE — TELEPHONE ENCOUNTER
Tried calling pt, no answer, no option to leave voice mail. Will try again later. Also sent msg through portal.

## 2022-12-26 LAB
GAMMA INTERFERON BACKGROUND BLD IA-ACNC: 0.1 IU/ML
M TB IFN-G BLD-IMP: NEGATIVE
M TB IFN-G CD4+ BCKGRND COR BLD-ACNC: 0.01 IU/ML
M TB IFN-G CD4+CD8+ BCKGRND COR BLD-ACNC: 0.01 IU/ML
MITOGEN IGNF BCKGRD COR BLD-ACNC: 9.9 IU/ML

## 2022-12-28 ENCOUNTER — TELEPHONE (OUTPATIENT)
Dept: INFECTIOUS DISEASES | Facility: CLINIC | Age: 32
End: 2022-12-28
Payer: MEDICAID

## 2022-12-28 NOTE — TELEPHONE ENCOUNTER
Tried calling pt, no answer, no option to leave voice mail. Will mail letter for pt to contact office.

## 2023-01-11 ENCOUNTER — TELEPHONE (OUTPATIENT)
Dept: INFECTIOUS DISEASES | Facility: CLINIC | Age: 33
End: 2023-01-11
Payer: MEDICAID

## 2023-01-11 NOTE — TELEPHONE ENCOUNTER
Called and spoke with Wendy in pharmacy. She stated since patient's insurance switched to [employer], the insurance is not uploaded in the system and looks like the plan does not start until March 2023. She stated she will call Medicaid to see how to get the plan active.     Called and spoke with patient. Informed him that Wendy in pharmacy will call Medicaid. Patient voiced understanding and will await her call.

## 2023-01-11 NOTE — TELEPHONE ENCOUNTER
Returned patient's call. He stated when he went to the pharmacy yesterday, they told him that [facility] dropped him and he now has another insurance-[employer]. When patient tried to fill his rx today, they told he can not because he has 2 insurances on file. Informed patient that I would contact Wendy in pharmacy to see if she is able to assist. Patient voiced understanding.    Attempted to contact patient. No answer at this time.

## 2023-01-11 NOTE — TELEPHONE ENCOUNTER
----- Message from Hetal Alejandre sent at 1/11/2023 11:00 AM CST -----  Regarding: Alec CHOWDARY PT -GLORIA Dodge is requesting a call back from Gloria regarding his medication. He is having trouble getting the medication bc of his insurance.   Please call back @ [phone]

## 2023-01-13 NOTE — TELEPHONE ENCOUNTER
Patient returned the call. He spoke to MILDRED Rodríguez. He let her know that he already picked up the rx.

## 2023-01-13 NOTE — TELEPHONE ENCOUNTER
Called and spoke with Wendy in pharmacy. She stated patient's Medicaid has been fixed and his meds are ready for .    Attempted to contact patient to inform him. No answer. Voicemail left to call the clinic back.

## 2023-01-20 ENCOUNTER — PATIENT MESSAGE (OUTPATIENT)
Dept: INFECTIOUS DISEASES | Facility: CLINIC | Age: 33
End: 2023-01-20
Payer: MEDICAID

## 2023-01-23 NOTE — TELEPHONE ENCOUNTER
Called and spoke with patient. He is not having an issue with his med at this time. He stated he is not sure if [place] will be going [place] work at this time. Encouraged him to either look through his [facility] booklet to see Triumeq is covered for 90 days or call his representative at [facility] to see if it is covered. Also encouraged patient to keep the clinic updated if the Triumeq is covered for 90 days. If so, a new refill can be sent for a 90 day supply. Patient voiced understanding.

## 2023-01-23 NOTE — TELEPHONE ENCOUNTER
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 Can you please reach out to him to see what is going on?    Thank you,   KASIA Gee-BC

## 2023-01-26 DIAGNOSIS — B20 AIDS (ACQUIRED IMMUNODEFICIENCY SYNDROME), CD4 >200 AND <500: Primary | ICD-10-CM

## 2023-01-26 RX ORDER — ABACAVIR SULFATE, DOLUTEGRAVIR SODIUM, LAMIVUDINE 600; 50; 300 MG/1; MG/1; MG/1
1 TABLET, FILM COATED ORAL DAILY
Qty: 90 TABLET | Refills: 0 | Status: SHIPPED | OUTPATIENT
Start: 2023-01-26 | End: 2023-03-28 | Stop reason: SDUPTHER

## 2023-01-26 RX ORDER — SULFAMETHOXAZOLE AND TRIMETHOPRIM 800; 160 MG/1; MG/1
1 TABLET ORAL DAILY
Qty: 90 TABLET | Refills: 0 | Status: CANCELLED | OUTPATIENT
Start: 2023-01-26

## 2023-01-26 NOTE — TELEPHONE ENCOUNTER
Patient called the clinic stating his insurance will allow a 90 day supply on his Triumeq. Patient requests a new rx sent to the pharmacy so he can  the 90 day supply the next time he refills his med. Patient also stated he will be working [place] at some point but is unsure as to when. Reminded patient that he has an appt on 03/28/2023 with Franca. Patient voiced understanding.     Called and spoke with Wendy in pharmacy to see if rx for Triumeq and Bactrim was received when originally prescribed for 90 days on 12/21/2022. Wendy stated she does not have the rx on file. A new rx will need to be completed.

## 2023-01-27 NOTE — TELEPHONE ENCOUNTER
Called Wendy in pharmacy to ensure new rx was received. She stated yes.     Called and spoke with patient to inform him that at his next refill it will be the 90 supply. Also informed patient that he can discontinue the Bactrim since his CD4 is >200 per Franca. Patient voiced understanding.

## 2023-02-16 ENCOUNTER — TELEPHONE (OUTPATIENT)
Dept: INFECTIOUS DISEASES | Facility: CLINIC | Age: 33
End: 2023-02-16
Payer: MEDICAID

## 2023-02-16 NOTE — TELEPHONE ENCOUNTER
----- Message from Hetal Alejandre sent at 2/16/2023  3:22 PM CST -----  Regarding: Documentation  RICARDA TUTTLE         Pt is requesting his viral load to be sent to [facility] [employer] [facility] so that he can return to work. He also is requesting a form for him to go back to work.   Fax # [phone]   Pt # [phone]

## 2023-02-17 ENCOUNTER — TELEPHONE (OUTPATIENT)
Dept: INFECTIOUS DISEASES | Facility: CLINIC | Age: 33
End: 2023-02-17
Payer: MEDICAID

## 2023-02-17 NOTE — TELEPHONE ENCOUNTER
----- Message from Hetal Alejandre sent at 2/17/2023  8:03 AM CST -----  Regarding: Additional info  RICARDA PT         Pt is requesting to be contacted through the portal regarding additional information that is needed.

## 2023-02-24 ENCOUNTER — TELEPHONE (OUTPATIENT)
Dept: INFECTIOUS DISEASES | Facility: CLINIC | Age: 33
End: 2023-02-24
Payer: MEDICAID

## 2023-02-24 NOTE — TELEPHONE ENCOUNTER
----- Message from Colleen Davis sent at 2/24/2023  9:18 AM CST -----  Regarding: pt call  #MULU Melton with [employer] called asking to speak with Franca regarding this pt.     Linh phone number: [phone]

## 2023-02-24 NOTE — TELEPHONE ENCOUNTER
Spoke with Linh she will fax a form to be filled by Glorai last form was filled in October,she needs an updated form

## 2023-03-28 ENCOUNTER — OFFICE VISIT (OUTPATIENT)
Dept: INFECTIOUS DISEASES | Facility: CLINIC | Age: 33
End: 2023-03-28
Payer: MEDICAID

## 2023-03-28 ENCOUNTER — TELEPHONE (OUTPATIENT)
Dept: INFECTIOUS DISEASES | Facility: CLINIC | Age: 33
End: 2023-03-28

## 2023-03-28 VITALS
HEIGHT: 66 IN | DIASTOLIC BLOOD PRESSURE: 85 MMHG | SYSTOLIC BLOOD PRESSURE: 124 MMHG | BODY MASS INDEX: 24.17 KG/M2 | WEIGHT: 150.38 LBS | TEMPERATURE: 98 F | HEART RATE: 67 BPM | RESPIRATION RATE: 16 BRPM

## 2023-03-28 DIAGNOSIS — B20 AIDS (ACQUIRED IMMUNODEFICIENCY SYNDROME), CD4 >200 AND <500: Primary | ICD-10-CM

## 2023-03-28 DIAGNOSIS — Z86.19 HISTORY OF SYPHILIS: ICD-10-CM

## 2023-03-28 DIAGNOSIS — Z86.19 HISTORY OF PNEUMOCYSTIS JIROVECII PNEUMONIA: ICD-10-CM

## 2023-03-28 DIAGNOSIS — E55.9 VITAMIN D DEFICIENCY: ICD-10-CM

## 2023-03-28 DIAGNOSIS — Z86.19 HISTORY OF MYCOBACTERIUM AVIUM COMPLEX INFECTION: ICD-10-CM

## 2023-03-28 DIAGNOSIS — Z11.3 ROUTINE SCREENING FOR STI (SEXUALLY TRANSMITTED INFECTION): ICD-10-CM

## 2023-03-28 LAB
ALBUMIN SERPL-MCNC: 4 G/DL (ref 3.5–5)
ALBUMIN/GLOB SERPL: 0.7 RATIO (ref 1.1–2)
ALP SERPL-CCNC: 389 UNIT/L (ref 40–150)
ALT SERPL-CCNC: 87 UNIT/L (ref 0–55)
AST SERPL-CCNC: 67 UNIT/L (ref 5–34)
BASOPHILS # BLD AUTO: 0.05 X10(3)/MCL (ref 0–0.2)
BASOPHILS NFR BLD AUTO: 1.1 %
BILIRUBIN DIRECT+TOT PNL SERPL-MCNC: 0.7 MG/DL
BUN SERPL-MCNC: 7.7 MG/DL (ref 8.9–20.6)
C TRACH DNA SPEC QL NAA+PROBE: DETECTED
C TRACH DNA SPEC QL NAA+PROBE: NOT DETECTED
CALCIUM SERPL-MCNC: 9.9 MG/DL (ref 8.4–10.2)
CHLORIDE SERPL-SCNC: 103 MMOL/L (ref 98–107)
CO2 SERPL-SCNC: 26 MMOL/L (ref 22–29)
CREAT SERPL-MCNC: 1.38 MG/DL (ref 0.73–1.18)
DEPRECATED CALCIDIOL+CALCIFEROL SERPL-MC: 22.2 NG/ML (ref 30–80)
EOSINOPHIL # BLD AUTO: 0.18 X10(3)/MCL (ref 0–0.9)
EOSINOPHIL NFR BLD AUTO: 3.8 %
ERYTHROCYTE [DISTWIDTH] IN BLOOD BY AUTOMATED COUNT: 13.2 % (ref 11.5–17)
GFR SERPLBLD CREATININE-BSD FMLA CKD-EPI: >60 MLS/MIN/1.73/M2
GLOBULIN SER-MCNC: 5.9 GM/DL (ref 2.4–3.5)
GLUCOSE SERPL-MCNC: 84 MG/DL (ref 74–100)
HCT VFR BLD AUTO: 44.8 % (ref 42–52)
HGB BLD-MCNC: 14.6 G/DL (ref 14–18)
IMM GRANULOCYTES # BLD AUTO: 0.01 X10(3)/MCL (ref 0–0.04)
IMM GRANULOCYTES NFR BLD AUTO: 0.2 %
LYMPHOCYTES # BLD AUTO: 2.25 X10(3)/MCL (ref 0.6–4.6)
LYMPHOCYTES NFR BLD AUTO: 47.4 %
MCH RBC QN AUTO: 29.7 PG (ref 27–31)
MCHC RBC AUTO-ENTMCNC: 32.6 G/DL (ref 33–36)
MCV RBC AUTO: 91.1 FL (ref 80–94)
MONOCYTES # BLD AUTO: 0.29 X10(3)/MCL (ref 0.1–1.3)
MONOCYTES NFR BLD AUTO: 6.1 %
N GONORRHOEA DNA SPEC QL NAA+PROBE: NOT DETECTED
N GONORRHOEA DNA SPEC QL NAA+PROBE: NOT DETECTED
NEUTROPHILS # BLD AUTO: 1.97 X10(3)/MCL (ref 2.1–9.2)
NEUTROPHILS NFR BLD AUTO: 41.4 %
NRBC BLD AUTO-RTO: 0 %
PLATELET # BLD AUTO: 379 X10(3)/MCL (ref 130–400)
PMV BLD AUTO: 8.7 FL (ref 7.4–10.4)
POTASSIUM SERPL-SCNC: 4.1 MMOL/L (ref 3.5–5.1)
PROT SERPL-MCNC: 9.9 GM/DL (ref 6.4–8.3)
RBC # BLD AUTO: 4.92 X10(6)/MCL (ref 4.7–6.1)
SODIUM SERPL-SCNC: 135 MMOL/L (ref 136–145)
T PALLIDUM AB SER QL: REACTIVE
WBC # SPEC AUTO: 4.8 X10(3)/MCL (ref 4.5–11.5)

## 2023-03-28 PROCEDURE — 99214 OFFICE O/P EST MOD 30 MIN: CPT | Mod: S$PBB,,, | Performed by: NURSE PRACTITIONER

## 2023-03-28 PROCEDURE — 36415 COLL VENOUS BLD VENIPUNCTURE: CPT | Performed by: NURSE PRACTITIONER

## 2023-03-28 PROCEDURE — 87536 HIV-1 QUANT&REVRSE TRNSCRPJ: CPT | Mod: 90 | Performed by: NURSE PRACTITIONER

## 2023-03-28 PROCEDURE — 3074F SYST BP LT 130 MM HG: CPT | Mod: CPTII,,, | Performed by: NURSE PRACTITIONER

## 2023-03-28 PROCEDURE — 86592 SYPHILIS TEST NON-TREP QUAL: CPT | Performed by: NURSE PRACTITIONER

## 2023-03-28 PROCEDURE — 85025 COMPLETE CBC W/AUTO DIFF WBC: CPT | Performed by: NURSE PRACTITIONER

## 2023-03-28 PROCEDURE — 3079F DIAST BP 80-89 MM HG: CPT | Mod: CPTII,,, | Performed by: NURSE PRACTITIONER

## 2023-03-28 PROCEDURE — 1160F RVW MEDS BY RX/DR IN RCRD: CPT | Mod: CPTII,,, | Performed by: NURSE PRACTITIONER

## 2023-03-28 PROCEDURE — 87591 N.GONORRHOEAE DNA AMP PROB: CPT | Mod: 91 | Performed by: NURSE PRACTITIONER

## 2023-03-28 PROCEDURE — 82306 VITAMIN D 25 HYDROXY: CPT | Performed by: NURSE PRACTITIONER

## 2023-03-28 PROCEDURE — 99214 PR OFFICE/OUTPT VISIT, EST, LEVL IV, 30-39 MIN: ICD-10-PCS | Mod: S$PBB,,, | Performed by: NURSE PRACTITIONER

## 2023-03-28 PROCEDURE — 99214 OFFICE O/P EST MOD 30 MIN: CPT | Mod: PBBFAC | Performed by: NURSE PRACTITIONER

## 2023-03-28 PROCEDURE — 3079F PR MOST RECENT DIASTOLIC BLOOD PRESSURE 80-89 MM HG: ICD-10-PCS | Mod: CPTII,,, | Performed by: NURSE PRACTITIONER

## 2023-03-28 PROCEDURE — 3008F PR BODY MASS INDEX (BMI) DOCUMENTED: ICD-10-PCS | Mod: CPTII,,, | Performed by: NURSE PRACTITIONER

## 2023-03-28 PROCEDURE — 3008F BODY MASS INDEX DOCD: CPT | Mod: CPTII,,, | Performed by: NURSE PRACTITIONER

## 2023-03-28 PROCEDURE — 1160F PR REVIEW ALL MEDS BY PRESCRIBER/CLIN PHARMACIST DOCUMENTED: ICD-10-PCS | Mod: CPTII,,, | Performed by: NURSE PRACTITIONER

## 2023-03-28 PROCEDURE — 80053 COMPREHEN METABOLIC PANEL: CPT | Performed by: NURSE PRACTITIONER

## 2023-03-28 PROCEDURE — 87491 CHLMYD TRACH DNA AMP PROBE: CPT | Mod: 90 | Performed by: NURSE PRACTITIONER

## 2023-03-28 PROCEDURE — 1159F PR MEDICATION LIST DOCUMENTED IN MEDICAL RECORD: ICD-10-PCS | Mod: CPTII,,, | Performed by: NURSE PRACTITIONER

## 2023-03-28 PROCEDURE — 86361 T CELL ABSOLUTE COUNT: CPT | Performed by: NURSE PRACTITIONER

## 2023-03-28 PROCEDURE — 3074F PR MOST RECENT SYSTOLIC BLOOD PRESSURE < 130 MM HG: ICD-10-PCS | Mod: CPTII,,, | Performed by: NURSE PRACTITIONER

## 2023-03-28 PROCEDURE — 86780 TREPONEMA PALLIDUM: CPT | Performed by: NURSE PRACTITIONER

## 2023-03-28 PROCEDURE — 1159F MED LIST DOCD IN RCRD: CPT | Mod: CPTII,,, | Performed by: NURSE PRACTITIONER

## 2023-03-28 RX ORDER — ERGOCALCIFEROL 1.25 MG/1
50000 CAPSULE ORAL
Qty: 12 CAPSULE | Refills: 1 | Status: SHIPPED | OUTPATIENT
Start: 2023-03-28 | End: 2023-06-29 | Stop reason: SDUPTHER

## 2023-03-28 RX ORDER — ABACAVIR SULFATE, DOLUTEGRAVIR SODIUM, LAMIVUDINE 600; 50; 300 MG/1; MG/1; MG/1
1 TABLET, FILM COATED ORAL DAILY
Qty: 90 TABLET | Refills: 1 | Status: SHIPPED | OUTPATIENT
Start: 2023-03-28 | End: 2023-06-29 | Stop reason: SDUPTHER

## 2023-03-28 NOTE — TELEPHONE ENCOUNTER
Patient informed of results and providers recommendations. All questions answered. Patient verbalized understanding.

## 2023-03-28 NOTE — TELEPHONE ENCOUNTER
----- Message from KASIA Staley sent at 3/28/2023  3:45 PM CDT -----  Reviewed labs. Creatinine is abnormal at 1.38. This has improved, but remains elevated. Of note, liver enzymes are elevated. Does patient drink/drugs or take any pain medications?     Please contact patient with results, ask the above questions and give patient the following recommendations:  Keep hydrated.  Avoid NSAIDs (ibuprofen, naproxen, aleve, advil, toradol or mobic)  Keep BP (goal <130/80) and cholesterol (LDL <100) within recommended goals  Low sodium diet encouraged; 2 grams per day  Increase exercise activity; 30 minutes 3-5 x per week   Maintain a healthy weight  Smoking cessation encouraged   Decrease ETOH intake/encouraged cessation   Medication compliance stressed   Keep all follow up appointments as scheduled

## 2023-03-28 NOTE — PROGRESS NOTES
Reviewed labs. Creatinine is abnormal at 1.38. This has improved, but remains elevated. Of note, liver enzymes are elevated. Does patient drink/drugs or take any pain medications?     Please contact patient with results, ask the above questions and give patient the following recommendations:  Keep hydrated.  Avoid NSAIDs (ibuprofen, naproxen, aleve, advil, toradol or mobic)  Keep BP (goal <130/80) and cholesterol (LDL <100) within recommended goals  Low sodium diet encouraged; 2 grams per day  Increase exercise activity; 30 minutes 3-5 x per week   Maintain a healthy weight  Smoking cessation encouraged   Decrease ETOH intake/encouraged cessation   Medication compliance stressed   Keep all follow up appointments as scheduled

## 2023-03-28 NOTE — PROGRESS NOTES
Subjective     Patient ID: Azael Hodge is a 32 y.o. male.    Chief Complaint: Follow-up (B20)    Azael is a 31 y/o AAM here for AIDS f/u. MSM. Dx 3/7/16. HLA  negative. Hospitalized from 10/19/21-11/9/21 with a diagnosis of AIDS with PJP and Disseminated MAC.   Pt was treated for disseminated MAC from 11/19/22 through 11/19/22.  He reports adherence to Triumeq tells me he is feeling great. Pt is no longer taking the Bactrim DS. He denies fever, headache, chills, visual problems, N/V/D, SOB, cough, chest pain or abd pain. Reviewed labs from 12/21/22 HIV VL ND, CD4 396, quant gold neg, creatinine 1.44, RPR 0 dils, Hep C ab NR, Vit D 16.9, . Hx of syphilis. Treated with Bicillin LA 2.4 million units x 1 3/1/19.  RPR titer at that time was 1:128. Pt reports adherence to Ergocalciferol 50,000 units once weekly.  He is followed by Dr. Cage for mediastinal lymphadenopathy that was noted during hospitalization 11/20/21-11/30/21.  CT thorax/abd/pelvis done 4/28/22 showed improvement of lymphadenopathy. Pt attended eye clinic 12/5/22. He is due for a COVID and flu vaccine, but is not amenable at this time.       I spent a total of 36 minutes on the day of the visit.This includes face to face time and non-face to face time preparing to see the patient (eg, review of tests), obtaining and/or reviewing separately obtained history, documenting clinical information in the electronic or other health record, independently interpreting results and communicating results to the patient/family/caregiver, or care coordinator.    Review of Systems   Constitutional: Negative.    HENT: Negative.     Eyes: Negative.    Respiratory: Negative.     Cardiovascular: Negative.    Gastrointestinal: Negative.    Genitourinary: Negative.    Musculoskeletal: Negative.    Integumentary:  Negative.   Neurological: Negative.    Psychiatric/Behavioral: Negative.          Objective     Physical Exam  Vitals reviewed.   Constitutional:        General: He is not in acute distress.     Appearance: Normal appearance.   HENT:      Head: Normocephalic.      Right Ear: External ear normal.      Left Ear: External ear normal.      Nose: Nose normal.      Mouth/Throat:      Mouth: Mucous membranes are moist.      Pharynx: Oropharynx is clear.   Eyes:      General: No scleral icterus.     Extraocular Movements: Extraocular movements intact.      Conjunctiva/sclera: Conjunctivae normal.      Pupils: Pupils are equal, round, and reactive to light.   Cardiovascular:      Rate and Rhythm: Normal rate and regular rhythm.      Pulses: Normal pulses.      Heart sounds: Normal heart sounds.   Pulmonary:      Effort: Pulmonary effort is normal. No respiratory distress.      Breath sounds: Normal breath sounds.   Abdominal:      General: Bowel sounds are normal. There is no distension.      Palpations: Abdomen is soft. There is no mass.      Tenderness: There is no abdominal tenderness. There is no right CVA tenderness or left CVA tenderness.      Hernia: No hernia is present.   Musculoskeletal:         General: No tenderness or signs of injury. Normal range of motion.      Cervical back: Normal range of motion and neck supple.      Right lower leg: No edema.      Left lower leg: No edema.   Lymphadenopathy:      Cervical: No cervical adenopathy.   Skin:     General: Skin is warm and dry.      Capillary Refill: Capillary refill takes less than 2 seconds.      Findings: No erythema or lesion.   Neurological:      General: No focal deficit present.      Mental Status: He is alert and oriented to person, place, and time. Mental status is at baseline.   Psychiatric:         Mood and Affect: Mood normal.         Behavior: Behavior normal.         Thought Content: Thought content normal.         Judgment: Judgment normal.          Assessment and Plan     Problem List Items Addressed This Visit    None  Visit Diagnoses       AIDS (acquired immunodeficiency syndrome), CD4  >200 and <500    -  Primary    Relevant Medications    TRIUMEQ 600- mg Tab    Other Relevant Orders    HIV-1 RNA, Quantitative, PCR with Reflex to Genotype    CD4 Lymphocytes    CBC Auto Differential    Comprehensive Metabolic Panel    Vitamin D deficiency        Relevant Medications    ergocalciferol (VITAMIN D2) 50,000 unit Cap    Other Relevant Orders    Vitamin D    History of syphilis        Routine screening for STI (sexually transmitted infection)        Relevant Orders    SYPHILIS ANTIBODY (WITH REFLEX RPR)    Chlamydia/GC, PCR    C.trach/N.gonor AMP RNA    Chlamydia/GC, PCR    History of Pneumocystis jirovecii pneumonia        History of Mycobacterium avium complex infection        Elevated TSH            AIDS (acquired immunodeficiency syndrome), CD4 >200 and <500  -     TRIUMEQ 600- mg Tab; Take 1 tablet by mouth once daily.  Dispense: 90 tablet; Refill: 1  -     HIV-1 RNA, Quantitative, PCR with Reflex to Genotype; Future; Expected date: 03/28/2023  -     CD4 Lymphocytes; Future; Expected date: 03/28/2023  -     CBC Auto Differential; Future; Expected date: 03/28/2023  -     Comprehensive Metabolic Panel; Future; Expected date: 03/28/2023    History of Pneumocystis jirovecii pneumonia  History of Mycobacterium avium complex infection    Vitamin D deficiency  -     ergocalciferol (VITAMIN D2) 50,000 unit Cap; Take 1 capsule (50,000 Units total) by mouth every 7 days.  Dispense: 12 capsule; Refill: 1  -     Vitamin D; Future; Expected date: 03/28/2023  Continue meds. Pt educated on importance of Vit D to bone and organ health.     History of syphilis  Treated with Bicillin LA 2.4 million units x 1 3/1/19.  RPR titer at that time was 1:128.  Repeat RPR today    Routine screening for STI (sexually transmitted infection)  -     SYPHILIS ANTIBODY (WITH REFLEX RPR); Future; Expected date: 03/28/2023  -     Chlamydia/GC, PCR  -     C.trach/N.gonor AMP RNA; Future; Expected date: 03/28/2023  -      Chlamydia/GC, PCR; Future; Expected date: 03/28/2023  Oral, rectal, urine CT/GC

## 2023-03-29 ENCOUNTER — TELEPHONE (OUTPATIENT)
Dept: INFECTIOUS DISEASES | Facility: CLINIC | Age: 33
End: 2023-03-29
Payer: MEDICAID

## 2023-03-29 DIAGNOSIS — A74.9 CHLAMYDIA: Primary | ICD-10-CM

## 2023-03-29 LAB
RPR SER QL: REACTIVE
RPR SER-TITR: ABNORMAL {TITER}

## 2023-03-29 RX ORDER — DOXYCYCLINE HYCLATE 100 MG
100 TABLET ORAL EVERY 12 HOURS
Qty: 14 TABLET | Refills: 0 | Status: SHIPPED | OUTPATIENT
Start: 2023-03-29 | End: 2023-04-05

## 2023-03-29 NOTE — PROGRESS NOTES
Reviewed labs. RPR 1:128. Pt will need to be treated with Bicillin LA 2.4 million units x 1 ASAP.  He also tested positive for rectal gonorrhea.Pt will need to start Doxycycline 100 mg 1 po BID x 7 days. I will send rx. Please contact patient with results. No sexual encounters until 7 days after medication is complete. Please encourage condom use with all sexual encounters.  Both the patient and the partner will need to be treated. Pt will need to be retested 1 week after the completion of meds to test for cure for chlamydia.

## 2023-03-29 NOTE — TELEPHONE ENCOUNTER
Patient informed of results and providers recommendations. All questions answered. Patient verbalized understanding.    Lay, please place pt on nurse visit for tomorrow morning  Bicillin inj x1 and on Thurs 4/6/23 for anal swab.

## 2023-03-29 NOTE — TELEPHONE ENCOUNTER
----- Message from KASIA Staley sent at 3/29/2023  3:50 PM CDT -----  Reviewed labs. RPR 1:128. Pt will need to be treated with Bicillin LA 2.4 million units x 1 ASAP.  He also tested positive for rectal gonorrhea.Pt will need to start Doxycycline 100 mg 1 po BID x 7 days. I will send rx. Please contact patient with results. No sexual encounters until 7 days after medication is complete. Please encourage condom use with all sexual encounters.  Both the patient and the partner will need to be treated. Pt will need to be retested 1 week after the completion of meds to test for cure for chlamydia.

## 2023-03-30 ENCOUNTER — CLINICAL SUPPORT (OUTPATIENT)
Dept: INFECTIOUS DISEASES | Facility: CLINIC | Age: 33
End: 2023-03-30
Payer: MEDICAID

## 2023-03-30 DIAGNOSIS — A53.9 SYPHILIS: Primary | ICD-10-CM

## 2023-03-30 DIAGNOSIS — Z86.19 HISTORY OF SYPHILIS: ICD-10-CM

## 2023-03-30 LAB
AGE: 33
CD3+CD4+ CELLS # BLD: 47 % (ref 28–48)
CD3+CD4+ CELLS # SPEC: 455.71 UNIT/L (ref 589–1505)
CD3+CD4+ CELLS NFR BLD: 20.2 %
LYMPHOCYTES # BLD AUTO: 2256 X10(3)/MCL (ref 1260–5520)
LYMPHOMA - T-CELL MARKERS SPEC-IMP: ABNORMAL
WBC # BLD AUTO: 4800 /MM3 (ref 4500–11500)

## 2023-03-30 PROCEDURE — 99211 OFF/OP EST MAY X REQ PHY/QHP: CPT | Mod: PBBFAC

## 2023-03-30 PROCEDURE — 96372 THER/PROPH/DIAG INJ SC/IM: CPT | Mod: PBBFAC

## 2023-03-30 RX ADMIN — PENICILLIN G BENZATHINE 2.4 MILLION UNITS: 1200000 INJECTION, SUSPENSION INTRAMUSCULAR at 10:03

## 2023-03-31 LAB — HIV1 RNA # PLAS NAA DL=20: NORMAL COPIES/ML

## 2023-04-01 LAB
C TRACH RRNA SPEC QL NAA+PROBE: NEGATIVE
N GONORRHOEA RRNA SPEC QL NAA+PROBE: NEGATIVE
SPECIMEN SOURCE: NORMAL
SPECIMEN SOURCE: NORMAL

## 2023-04-06 ENCOUNTER — CLINICAL SUPPORT (OUTPATIENT)
Dept: INFECTIOUS DISEASES | Facility: CLINIC | Age: 33
End: 2023-04-06
Payer: MEDICAID

## 2023-04-06 DIAGNOSIS — A74.9 CHLAMYDIA: ICD-10-CM

## 2023-04-06 LAB
C TRACH DNA SPEC QL NAA+PROBE: NOT DETECTED
N GONORRHOEA DNA SPEC QL NAA+PROBE: NOT DETECTED

## 2023-04-06 PROCEDURE — 87591 N.GONORRHOEAE DNA AMP PROB: CPT

## 2023-04-06 PROCEDURE — 99211 OFF/OP EST MAY X REQ PHY/QHP: CPT | Mod: PBBFAC

## 2023-06-05 ENCOUNTER — TELEPHONE (OUTPATIENT)
Dept: HEMATOLOGY/ONCOLOGY | Facility: CLINIC | Age: 33
End: 2023-06-05
Payer: MEDICAID

## 2023-06-05 NOTE — TELEPHONE ENCOUNTER
Attempted to contact pt to r/s appts. Explained he no showed for CT scan and lab appts last week. Instructed him to call office back to get all appts r/s'd.

## 2023-06-29 ENCOUNTER — OFFICE VISIT (OUTPATIENT)
Dept: INFECTIOUS DISEASES | Facility: CLINIC | Age: 33
End: 2023-06-29
Payer: MEDICAID

## 2023-06-29 VITALS
TEMPERATURE: 98 F | RESPIRATION RATE: 16 BRPM | WEIGHT: 155.63 LBS | BODY MASS INDEX: 25.01 KG/M2 | HEIGHT: 66 IN | DIASTOLIC BLOOD PRESSURE: 88 MMHG | HEART RATE: 90 BPM | SYSTOLIC BLOOD PRESSURE: 130 MMHG

## 2023-06-29 DIAGNOSIS — Z23 IMMUNIZATION DUE: ICD-10-CM

## 2023-06-29 DIAGNOSIS — Z86.19 HISTORY OF PNEUMOCYSTIS JIROVECII PNEUMONIA: ICD-10-CM

## 2023-06-29 DIAGNOSIS — Z86.19 HISTORY OF CHLAMYDIA: ICD-10-CM

## 2023-06-29 DIAGNOSIS — B20 AIDS (ACQUIRED IMMUNODEFICIENCY SYNDROME), CD4 >200 AND <500: Primary | ICD-10-CM

## 2023-06-29 DIAGNOSIS — E55.9 VITAMIN D DEFICIENCY: ICD-10-CM

## 2023-06-29 DIAGNOSIS — Z86.19 HX OF SYPHILIS: ICD-10-CM

## 2023-06-29 DIAGNOSIS — Z86.19 HISTORY OF MYCOBACTERIUM AVIUM COMPLEX INFECTION: ICD-10-CM

## 2023-06-29 LAB
ABS NEUT CALC (OHS): 1.31 X10(3)/MCL (ref 2.1–9.2)
ALBUMIN SERPL-MCNC: 4.4 G/DL (ref 3.5–5)
ALBUMIN/GLOB SERPL: 1.1 RATIO (ref 1.1–2)
ALP SERPL-CCNC: 109 UNIT/L (ref 40–150)
ALT SERPL-CCNC: 24 UNIT/L (ref 0–55)
AST SERPL-CCNC: 26 UNIT/L (ref 5–34)
BILIRUBIN DIRECT+TOT PNL SERPL-MCNC: 0.6 MG/DL
BUN SERPL-MCNC: 7.8 MG/DL (ref 8.9–20.6)
C TRACH DNA SPEC QL NAA+PROBE: NOT DETECTED
CALCIUM SERPL-MCNC: 9.4 MG/DL (ref 8.4–10.2)
CHLORIDE SERPL-SCNC: 105 MMOL/L (ref 98–107)
CO2 SERPL-SCNC: 26 MMOL/L (ref 22–29)
CREAT SERPL-MCNC: 1.41 MG/DL (ref 0.73–1.18)
EOSINOPHIL NFR BLD MANUAL: 0.21 X10(3)/MCL (ref 0–0.9)
EOSINOPHIL NFR BLD MANUAL: 5 % (ref 0–8)
ERYTHROCYTE [DISTWIDTH] IN BLOOD BY AUTOMATED COUNT: 12.6 % (ref 11.5–17)
GFR SERPLBLD CREATININE-BSD FMLA CKD-EPI: >60 MLS/MIN/1.73/M2
GLOBULIN SER-MCNC: 4 GM/DL (ref 2.4–3.5)
GLUCOSE SERPL-MCNC: 83 MG/DL (ref 74–100)
HCT VFR BLD AUTO: 45.2 % (ref 42–52)
HGB BLD-MCNC: 15.4 G/DL (ref 14–18)
LYMPHOCYTES NFR BLD MANUAL: 2.42 X10(3)/MCL
LYMPHOCYTES NFR BLD MANUAL: 57 % (ref 13–40)
MCH RBC QN AUTO: 30.5 PG (ref 27–31)
MCHC RBC AUTO-ENTMCNC: 34.1 G/DL (ref 33–36)
MCV RBC AUTO: 89.5 FL (ref 80–94)
MONOCYTES NFR BLD MANUAL: 0.3 X10(3)/MCL (ref 0.1–1.3)
MONOCYTES NFR BLD MANUAL: 7 % (ref 2–11)
N GONORRHOEA DNA SPEC QL NAA+PROBE: NOT DETECTED
NEUTROPHILS NFR BLD MANUAL: 30 % (ref 47–80)
NEUTS BAND NFR BLD MANUAL: 1 % (ref 0–11)
NRBC BLD AUTO-RTO: 0 %
PLATELET # BLD AUTO: 285 X10(3)/MCL (ref 130–400)
PLATELET # BLD EST: ADEQUATE 10*3/UL
PMV BLD AUTO: 10.1 FL (ref 7.4–10.4)
POTASSIUM SERPL-SCNC: 4.4 MMOL/L (ref 3.5–5.1)
PROT SERPL-MCNC: 8.4 GM/DL (ref 6.4–8.3)
RBC # BLD AUTO: 5.05 X10(6)/MCL (ref 4.7–6.1)
RBC MORPH BLD: NORMAL
SODIUM SERPL-SCNC: 138 MMOL/L (ref 136–145)
T PALLIDUM AB SER QL: REACTIVE
WBC # SPEC AUTO: 4.24 X10(3)/MCL (ref 4.5–11.5)

## 2023-06-29 PROCEDURE — 3079F DIAST BP 80-89 MM HG: CPT | Mod: CPTII,,, | Performed by: NURSE PRACTITIONER

## 2023-06-29 PROCEDURE — 90471 IMMUNIZATION ADMIN: CPT | Mod: PBBFAC

## 2023-06-29 PROCEDURE — 90651 9VHPV VACCINE 2/3 DOSE IM: CPT | Mod: PBBFAC

## 2023-06-29 PROCEDURE — 3079F PR MOST RECENT DIASTOLIC BLOOD PRESSURE 80-89 MM HG: ICD-10-PCS | Mod: CPTII,,, | Performed by: NURSE PRACTITIONER

## 2023-06-29 PROCEDURE — 1160F RVW MEDS BY RX/DR IN RCRD: CPT | Mod: CPTII,,, | Performed by: NURSE PRACTITIONER

## 2023-06-29 PROCEDURE — 99214 OFFICE O/P EST MOD 30 MIN: CPT | Mod: S$PBB,,, | Performed by: NURSE PRACTITIONER

## 2023-06-29 PROCEDURE — 3075F PR MOST RECENT SYSTOLIC BLOOD PRESS GE 130-139MM HG: ICD-10-PCS | Mod: CPTII,,, | Performed by: NURSE PRACTITIONER

## 2023-06-29 PROCEDURE — 1160F PR REVIEW ALL MEDS BY PRESCRIBER/CLIN PHARMACIST DOCUMENTED: ICD-10-PCS | Mod: CPTII,,, | Performed by: NURSE PRACTITIONER

## 2023-06-29 PROCEDURE — 86361 T CELL ABSOLUTE COUNT: CPT | Performed by: NURSE PRACTITIONER

## 2023-06-29 PROCEDURE — 3008F BODY MASS INDEX DOCD: CPT | Mod: CPTII,,, | Performed by: NURSE PRACTITIONER

## 2023-06-29 PROCEDURE — 85027 COMPLETE CBC AUTOMATED: CPT | Performed by: NURSE PRACTITIONER

## 2023-06-29 PROCEDURE — 87591 N.GONORRHOEAE DNA AMP PROB: CPT | Performed by: NURSE PRACTITIONER

## 2023-06-29 PROCEDURE — 99214 OFFICE O/P EST MOD 30 MIN: CPT | Mod: PBBFAC | Performed by: NURSE PRACTITIONER

## 2023-06-29 PROCEDURE — 1159F MED LIST DOCD IN RCRD: CPT | Mod: CPTII,,, | Performed by: NURSE PRACTITIONER

## 2023-06-29 PROCEDURE — 86780 TREPONEMA PALLIDUM: CPT | Performed by: NURSE PRACTITIONER

## 2023-06-29 PROCEDURE — 86592 SYPHILIS TEST NON-TREP QUAL: CPT | Performed by: NURSE PRACTITIONER

## 2023-06-29 PROCEDURE — 80053 COMPREHEN METABOLIC PANEL: CPT | Performed by: NURSE PRACTITIONER

## 2023-06-29 PROCEDURE — 3075F SYST BP GE 130 - 139MM HG: CPT | Mod: CPTII,,, | Performed by: NURSE PRACTITIONER

## 2023-06-29 PROCEDURE — 3008F PR BODY MASS INDEX (BMI) DOCUMENTED: ICD-10-PCS | Mod: CPTII,,, | Performed by: NURSE PRACTITIONER

## 2023-06-29 PROCEDURE — 99214 PR OFFICE/OUTPT VISIT, EST, LEVL IV, 30-39 MIN: ICD-10-PCS | Mod: S$PBB,,, | Performed by: NURSE PRACTITIONER

## 2023-06-29 PROCEDURE — 87536 HIV-1 QUANT&REVRSE TRNSCRPJ: CPT | Performed by: NURSE PRACTITIONER

## 2023-06-29 PROCEDURE — 36415 COLL VENOUS BLD VENIPUNCTURE: CPT | Performed by: NURSE PRACTITIONER

## 2023-06-29 PROCEDURE — 1159F PR MEDICATION LIST DOCUMENTED IN MEDICAL RECORD: ICD-10-PCS | Mod: CPTII,,, | Performed by: NURSE PRACTITIONER

## 2023-06-29 RX ORDER — ABACAVIR SULFATE, DOLUTEGRAVIR SODIUM, LAMIVUDINE 600; 50; 300 MG/1; MG/1; MG/1
1 TABLET, FILM COATED ORAL DAILY
Qty: 90 TABLET | Refills: 1 | Status: SHIPPED | OUTPATIENT
Start: 2023-06-29 | End: 2023-09-27 | Stop reason: SDUPTHER

## 2023-06-29 RX ORDER — ERGOCALCIFEROL 1.25 MG/1
50000 CAPSULE ORAL
Qty: 12 CAPSULE | Refills: 1 | Status: SHIPPED | OUTPATIENT
Start: 2023-06-29 | End: 2023-09-27 | Stop reason: SDUPTHER

## 2023-06-29 NOTE — PROGRESS NOTES
Subjective     Patient ID: Azael Hodge is a 32 y.o. male.    Chief Complaint: Follow-up (B20)    Azael is a 33 y/o AAM here for AIDS f/u. MSM. Dx 3/7/16. HLA  negative. Hospitalized from 10/19/21-11/9/21 with a diagnosis of AIDS with PJP and Disseminated MAC.   Pt was treated for disseminated MAC from 11/19/22 through 11/19/22.  He reports adherence to Triumeq. Denies fever, headache, chills, visual problems, N/V/D, SOB, cough, chest pain or abd pain. Reviewed labs from 3/28/23 HIV VL ND, CD4 455,  dils, Vit D 22.2, creatinine 1.38.  ALK phos 389, AST 67, ALT 87, anal chlamydia positive. Pt will need updated labs today to include a rectal chlamydia test to test for cure. Hx of syphilis. Treated with Bicillin LA 2.4 million units x 1 3/1/19.  RPR titer at that time was 1:128. Treated again 3/30/23 with Bicillin LA 2.4 million units x 1.  He reports adherence to Ergocalciferol 50,000 units once weekly.  Pt is followed by Dr. Cage for mediastinal lymphadenopathy that was noted during hospitalization 11/20/21-11/30/21.  CT thorax/abd/pelvis done 4/28/22 showed improvement of lymphadenopathy. He states he missed his last f/u appt bc he was working so he needs to schedule it.  Eye appt is scheduled for 7/12/23.  Pt is due for COVID and Gardasil 9 series. He is amenable.    Review of Systems   Constitutional: Negative.    HENT: Negative.     Eyes: Negative.    Respiratory: Negative.     Cardiovascular: Negative.    Gastrointestinal: Negative.    Genitourinary: Negative.    Musculoskeletal: Negative.    Integumentary:  Negative.   Neurological: Negative.    Psychiatric/Behavioral: Negative.          Objective     Physical Exam  Vitals reviewed.   Constitutional:       General: He is not in acute distress.     Appearance: Normal appearance.   HENT:      Head: Normocephalic.      Right Ear: External ear normal.      Left Ear: External ear normal.      Nose: Nose normal.      Mouth/Throat:      Mouth:  Mucous membranes are moist.      Pharynx: Oropharynx is clear.   Eyes:      General: No scleral icterus.     Extraocular Movements: Extraocular movements intact.      Conjunctiva/sclera: Conjunctivae normal.      Pupils: Pupils are equal, round, and reactive to light.   Cardiovascular:      Rate and Rhythm: Normal rate and regular rhythm.      Pulses: Normal pulses.      Heart sounds: Normal heart sounds.   Pulmonary:      Effort: Pulmonary effort is normal. No respiratory distress.      Breath sounds: Normal breath sounds.   Abdominal:      General: Bowel sounds are normal. There is no distension.      Palpations: Abdomen is soft. There is no mass.      Tenderness: There is no abdominal tenderness. There is no right CVA tenderness or left CVA tenderness.      Hernia: No hernia is present.   Musculoskeletal:         General: No tenderness or signs of injury. Normal range of motion.      Cervical back: Normal range of motion and neck supple.      Right lower leg: No edema.      Left lower leg: No edema.   Lymphadenopathy:      Cervical: No cervical adenopathy.   Skin:     General: Skin is warm and dry.      Capillary Refill: Capillary refill takes less than 2 seconds.      Findings: No erythema or lesion.   Neurological:      General: No focal deficit present.      Mental Status: He is alert and oriented to person, place, and time. Mental status is at baseline.   Psychiatric:         Mood and Affect: Mood normal.         Behavior: Behavior normal.         Thought Content: Thought content normal.         Judgment: Judgment normal.          Assessment and Plan     1. AIDS (acquired immunodeficiency syndrome), CD4 >200 and <500  -     TRIUMEQ 600- mg Tab; Take 1 tablet by mouth once daily.  Dispense: 90 tablet; Refill: 1  -     HIV-1 RNA, Quantitative, PCR with Reflex to Genotype; Future; Expected date: 06/29/2023  -     CD4 Lymphocytes; Future; Expected date: 06/29/2023  -     CBC Auto Differential; Future;  Expected date: 06/29/2023  -     Comprehensive Metabolic Panel; Future; Expected date: 06/29/2023  Continue Triumeq 1 poq day   Discussed HIV status at length to include need for 100% medication compliance. Counseled on medication adherence/resistance as well as importance of attending all scheduled follow up appointments/labs. Sexual protection with condoms recommended.  RTC 3 months with Franca for f/u, labs today  Fundus photo scheduled for 7/12/23  STI screening 3/28/23  COVID and flu precautions discussed   COVID booster and Gardasil 9 vaccine recommended     2. Vitamin D deficiency  -     ergocalciferol (VITAMIN D2) 50,000 unit Cap; Take 1 capsule (50,000 Units total) by mouth every 7 days.  Dispense: 12 capsule; Refill: 1  Continue meds. Pt educated on importance of Vit D to bone and organ health. D    3. Hx of syphilis  -     SYPHILIS ANTIBODY (WITH REFLEX RPR); Future; Expected date: 06/29/2023  Treated with Bicillin LA 2.4 million units x 1 3/1/19.  RPR titer at that time was 1:128. Treated again 3/30/23 with Bicillin LA 2.4 million units x 1.   Repeat RPR     4. History of chlamydia  -     Chlamydia/GC, PCR; Future; Expected date: 06/29/2023  Repeat rectal chlamydia test for test of cure    5. History of Pneumocystis jirovecii pneumonia    6. History of Mycobacterium avium complex infection    7. Immunization due  -     (In Office Administered) HPV Vaccine (9-Valent) (3 Dose) (IM); Future; Expected date: 12/29/2023  -     (In Office Administered) HPV Vaccine (9-Valent) (3 Dose) (IM); Future; Expected date: 08/29/2023  -     (In Office Administered) HPV Vaccine (9-Valent) (3 Dose) (IM); Future; Expected date: 06/29/2023    I spent a total of 30 minutes on the day of the visit.This includes face to face time and non-face to face time preparing to see the patient (eg, review of tests), obtaining and/or reviewing separately obtained history, documenting clinical information in the electronic or other health  record, independently interpreting results and communicating results to the patient/family/caregiver, or care coordinator.

## 2023-06-30 ENCOUNTER — TELEPHONE (OUTPATIENT)
Dept: INFECTIOUS DISEASES | Facility: CLINIC | Age: 33
End: 2023-06-30
Payer: MEDICAID

## 2023-06-30 LAB
AGE: 33
CD3+CD4+ CELLS # SPEC: 573.25 UNIT/L (ref 589–1505)
CD3+CD4+ CELLS NFR BLD: 20.8 %
LYMPHOCYTES # BLD AUTO: 2756 X10(3)/MCL (ref 1260–5520)
LYMPHOCYTES NFR LN MANUAL: 65 % (ref 28–48)
LYMPHOMA - T-CELL MARKERS SPEC-IMP: ABNORMAL
RPR SER QL: REACTIVE
RPR SER-TITR: ABNORMAL {TITER}
WBC # BLD AUTO: 4240 /MM3 (ref 4500–11500)

## 2023-06-30 NOTE — TELEPHONE ENCOUNTER
----- Message from KASIA Staley sent at 6/30/2023  9:49 AM CDT -----  Reviewed labs. Creatinine is abnormal at 1.41.  Please contact patient with results and the following recommendations:  Keep hydrated.  Avoid NSAIDs (ibuprofen, naproxen, aleve, advil, toradol or mobic)  Keep BP (goal <130/80) and cholesterol (LDL <100) within recommended goals  Low sodium diet encouraged; 2 grams per day  Increase exercise activity; 30 minutes 3-5 x per week   Maintain a healthy weight  Smoking cessation encouraged   Decrease ETOH intake/encouraged cessation   Medication compliance stressed   Keep all follow up appointments as scheduled

## 2023-06-30 NOTE — PROGRESS NOTES
Reviewed labs. Creatinine is abnormal at 1.41.  Please contact patient with results and the following recommendations:  Keep hydrated.  Avoid NSAIDs (ibuprofen, naproxen, aleve, advil, toradol or mobic)  Keep BP (goal <130/80) and cholesterol (LDL <100) within recommended goals  Low sodium diet encouraged; 2 grams per day  Increase exercise activity; 30 minutes 3-5 x per week   Maintain a healthy weight  Smoking cessation encouraged   Decrease ETOH intake/encouraged cessation   Medication compliance stressed   Keep all follow up appointments as scheduled

## 2023-07-03 ENCOUNTER — HOSPITAL ENCOUNTER (OUTPATIENT)
Dept: RADIOLOGY | Facility: HOSPITAL | Age: 33
Discharge: HOME | End: 2023-07-03

## 2023-07-03 DIAGNOSIS — Z02.1 PRE-EMPLOYMENT EXAMINATION: ICD-10-CM

## 2023-07-03 DIAGNOSIS — Z02.1 PRE-EMPLOYMENT EXAMINATION: Primary | ICD-10-CM

## 2023-07-03 PROCEDURE — 72141 MRI NECK SPINE W/O DYE: CPT | Mod: TC,52

## 2023-07-05 LAB — HIV1 RNA # PLAS NAA DL=20: NORMAL COPIES/ML

## 2023-07-10 ENCOUNTER — PATIENT MESSAGE (OUTPATIENT)
Dept: INFECTIOUS DISEASES | Facility: CLINIC | Age: 33
End: 2023-07-10
Payer: MEDICAID

## 2023-08-31 DIAGNOSIS — R59.0 LOCALIZED ENLARGED LYMPH NODES: ICD-10-CM

## 2023-08-31 DIAGNOSIS — R59.1 LYMPHADENOPATHY: Primary | ICD-10-CM

## 2023-09-22 ENCOUNTER — OFFICE VISIT (OUTPATIENT)
Dept: OPHTHALMOLOGY | Facility: CLINIC | Age: 33
End: 2023-09-22
Payer: MEDICAID

## 2023-09-22 VITALS — WEIGHT: 155.63 LBS | BODY MASS INDEX: 25.01 KG/M2 | HEIGHT: 66 IN

## 2023-09-22 DIAGNOSIS — B20 AIDS (ACQUIRED IMMUNODEFICIENCY SYNDROME), CD4 <=200: Primary | ICD-10-CM

## 2023-09-22 PROCEDURE — 99212 OFFICE O/P EST SF 10 MIN: CPT | Mod: PBBFAC,PO | Performed by: STUDENT IN AN ORGANIZED HEALTH CARE EDUCATION/TRAINING PROGRAM

## 2023-09-22 RX ORDER — PHENYLEPH/TROPICAMIDE IN WATER 2.5 %-1 %
1 DROPS OPHTHALMIC (EYE) ONCE
Status: COMPLETED | OUTPATIENT
Start: 2023-09-22 | End: 2023-09-22

## 2023-09-22 RX ADMIN — Medication 1 DROP: at 11:09

## 2023-09-22 NOTE — PROGRESS NOTES
HPI     B20      Additional comments: Patient states that vision is good. He wears color   contacts. He takes them out every night and wears glasses occasionally.            Comments    B20           Last edited by Lisha Castillo MA on 9/22/2023 11:38 AM.            Assessment /Plan     For exam results, see Encounter Report.    AIDS (acquired immunodeficiency syndrome), CD4 <=200  -     tropicamide /PHENYLephrine opthalmic solution 1 drop      HIV W/O RETINOPATHY  - Last CD4 573  - No abnormality on eye exam   - Return precautions discussed     Rtc 1 YEAR                 RTC 6 months DFE

## 2023-09-27 ENCOUNTER — OFFICE VISIT (OUTPATIENT)
Dept: INFECTIOUS DISEASES | Facility: CLINIC | Age: 33
End: 2023-09-27
Payer: MEDICAID

## 2023-09-27 VITALS
SYSTOLIC BLOOD PRESSURE: 123 MMHG | WEIGHT: 164.38 LBS | HEIGHT: 66 IN | TEMPERATURE: 98 F | DIASTOLIC BLOOD PRESSURE: 75 MMHG | BODY MASS INDEX: 26.42 KG/M2 | HEART RATE: 67 BPM | RESPIRATION RATE: 14 BRPM

## 2023-09-27 DIAGNOSIS — Z23 IMMUNIZATION DUE: ICD-10-CM

## 2023-09-27 DIAGNOSIS — B20 AIDS: Primary | ICD-10-CM

## 2023-09-27 DIAGNOSIS — N28.9 RENAL INSUFFICIENCY: ICD-10-CM

## 2023-09-27 DIAGNOSIS — A53.9 SYPHILIS: ICD-10-CM

## 2023-09-27 DIAGNOSIS — E55.9 VITAMIN D DEFICIENCY: ICD-10-CM

## 2023-09-27 LAB
ALBUMIN SERPL-MCNC: 4.3 G/DL (ref 3.5–5)
ALBUMIN/GLOB SERPL: 1.3 RATIO (ref 1.1–2)
ALP SERPL-CCNC: 90 UNIT/L (ref 40–150)
ALT SERPL-CCNC: 19 UNIT/L (ref 0–55)
AST SERPL-CCNC: 24 UNIT/L (ref 5–34)
BASOPHILS # BLD AUTO: 0.04 X10(3)/MCL
BASOPHILS NFR BLD AUTO: 1 %
BILIRUB SERPL-MCNC: 0.7 MG/DL
BUN SERPL-MCNC: 7.1 MG/DL (ref 8.9–20.6)
CALCIUM SERPL-MCNC: 9.5 MG/DL (ref 8.4–10.2)
CHLORIDE SERPL-SCNC: 106 MMOL/L (ref 98–107)
CO2 SERPL-SCNC: 26 MMOL/L (ref 22–29)
CREAT SERPL-MCNC: 1.38 MG/DL (ref 0.73–1.18)
DEPRECATED CALCIDIOL+CALCIFEROL SERPL-MC: 27.1 NG/ML (ref 30–80)
EOSINOPHIL # BLD AUTO: 0.26 X10(3)/MCL (ref 0–0.9)
EOSINOPHIL NFR BLD AUTO: 6.6 %
ERYTHROCYTE [DISTWIDTH] IN BLOOD BY AUTOMATED COUNT: 12.5 % (ref 11.5–17)
GFR SERPLBLD CREATININE-BSD FMLA CKD-EPI: >60 MLS/MIN/1.73/M2
GLOBULIN SER-MCNC: 3.4 GM/DL (ref 2.4–3.5)
GLUCOSE SERPL-MCNC: 100 MG/DL (ref 74–100)
HCT VFR BLD AUTO: 43.5 % (ref 42–52)
HGB BLD-MCNC: 14.2 G/DL (ref 14–18)
IMM GRANULOCYTES # BLD AUTO: 0.01 X10(3)/MCL (ref 0–0.04)
IMM GRANULOCYTES NFR BLD AUTO: 0.3 %
LYMPHOCYTES # BLD AUTO: 2.25 X10(3)/MCL (ref 0.6–4.6)
LYMPHOCYTES NFR BLD AUTO: 57.5 %
MCH RBC QN AUTO: 30 PG (ref 27–31)
MCHC RBC AUTO-ENTMCNC: 32.6 G/DL (ref 33–36)
MCV RBC AUTO: 91.8 FL (ref 80–94)
MONOCYTES # BLD AUTO: 0.24 X10(3)/MCL (ref 0.1–1.3)
MONOCYTES NFR BLD AUTO: 6.1 %
NEUTROPHILS # BLD AUTO: 1.11 X10(3)/MCL (ref 2.1–9.2)
NEUTROPHILS NFR BLD AUTO: 28.5 %
NRBC BLD AUTO-RTO: 0 %
PLATELET # BLD AUTO: 247 X10(3)/MCL (ref 130–400)
PMV BLD AUTO: 9.9 FL (ref 7.4–10.4)
POTASSIUM SERPL-SCNC: 4.4 MMOL/L (ref 3.5–5.1)
PROT SERPL-MCNC: 7.7 GM/DL (ref 6.4–8.3)
RBC # BLD AUTO: 4.74 X10(6)/MCL (ref 4.7–6.1)
SODIUM SERPL-SCNC: 138 MMOL/L (ref 136–145)
T PALLIDUM AB SER QL: REACTIVE
WBC # SPEC AUTO: 3.91 X10(3)/MCL (ref 4.5–11.5)

## 2023-09-27 PROCEDURE — 1159F MED LIST DOCD IN RCRD: CPT | Mod: CPTII,,, | Performed by: NURSE PRACTITIONER

## 2023-09-27 PROCEDURE — 99214 OFFICE O/P EST MOD 30 MIN: CPT | Mod: PBBFAC | Performed by: NURSE PRACTITIONER

## 2023-09-27 PROCEDURE — 86592 SYPHILIS TEST NON-TREP QUAL: CPT | Performed by: NURSE PRACTITIONER

## 2023-09-27 PROCEDURE — 3074F PR MOST RECENT SYSTOLIC BLOOD PRESSURE < 130 MM HG: ICD-10-PCS | Mod: CPTII,,, | Performed by: NURSE PRACTITIONER

## 2023-09-27 PROCEDURE — 90651 9VHPV VACCINE 2/3 DOSE IM: CPT | Mod: PBBFAC

## 2023-09-27 PROCEDURE — 3074F SYST BP LT 130 MM HG: CPT | Mod: CPTII,,, | Performed by: NURSE PRACTITIONER

## 2023-09-27 PROCEDURE — 3078F DIAST BP <80 MM HG: CPT | Mod: CPTII,,, | Performed by: NURSE PRACTITIONER

## 2023-09-27 PROCEDURE — 3008F BODY MASS INDEX DOCD: CPT | Mod: CPTII,,, | Performed by: NURSE PRACTITIONER

## 2023-09-27 PROCEDURE — 85025 COMPLETE CBC W/AUTO DIFF WBC: CPT | Performed by: NURSE PRACTITIONER

## 2023-09-27 PROCEDURE — 1160F RVW MEDS BY RX/DR IN RCRD: CPT | Mod: CPTII,,, | Performed by: NURSE PRACTITIONER

## 2023-09-27 PROCEDURE — 1160F PR REVIEW ALL MEDS BY PRESCRIBER/CLIN PHARMACIST DOCUMENTED: ICD-10-PCS | Mod: CPTII,,, | Performed by: NURSE PRACTITIONER

## 2023-09-27 PROCEDURE — 80053 COMPREHEN METABOLIC PANEL: CPT | Performed by: NURSE PRACTITIONER

## 2023-09-27 PROCEDURE — 87536 HIV-1 QUANT&REVRSE TRNSCRPJ: CPT | Performed by: NURSE PRACTITIONER

## 2023-09-27 PROCEDURE — 86780 TREPONEMA PALLIDUM: CPT | Performed by: NURSE PRACTITIONER

## 2023-09-27 PROCEDURE — 1159F PR MEDICATION LIST DOCUMENTED IN MEDICAL RECORD: ICD-10-PCS | Mod: CPTII,,, | Performed by: NURSE PRACTITIONER

## 2023-09-27 PROCEDURE — 3008F PR BODY MASS INDEX (BMI) DOCUMENTED: ICD-10-PCS | Mod: CPTII,,, | Performed by: NURSE PRACTITIONER

## 2023-09-27 PROCEDURE — 99214 OFFICE O/P EST MOD 30 MIN: CPT | Mod: S$PBB,,, | Performed by: NURSE PRACTITIONER

## 2023-09-27 PROCEDURE — 90471 IMMUNIZATION ADMIN: CPT | Mod: PBBFAC

## 2023-09-27 PROCEDURE — 82306 VITAMIN D 25 HYDROXY: CPT | Performed by: NURSE PRACTITIONER

## 2023-09-27 PROCEDURE — 86361 T CELL ABSOLUTE COUNT: CPT | Performed by: NURSE PRACTITIONER

## 2023-09-27 PROCEDURE — 3078F PR MOST RECENT DIASTOLIC BLOOD PRESSURE < 80 MM HG: ICD-10-PCS | Mod: CPTII,,, | Performed by: NURSE PRACTITIONER

## 2023-09-27 PROCEDURE — 36415 COLL VENOUS BLD VENIPUNCTURE: CPT | Performed by: NURSE PRACTITIONER

## 2023-09-27 PROCEDURE — 99214 PR OFFICE/OUTPT VISIT, EST, LEVL IV, 30-39 MIN: ICD-10-PCS | Mod: S$PBB,,, | Performed by: NURSE PRACTITIONER

## 2023-09-27 RX ORDER — ERGOCALCIFEROL 1.25 MG/1
50000 CAPSULE ORAL
Qty: 12 CAPSULE | Refills: 1 | Status: SHIPPED | OUTPATIENT
Start: 2023-09-27 | End: 2024-02-23 | Stop reason: SDUPTHER

## 2023-09-27 RX ORDER — ABACAVIR SULFATE, DOLUTEGRAVIR SODIUM, LAMIVUDINE 600; 50; 300 MG/1; MG/1; MG/1
1 TABLET, FILM COATED ORAL DAILY
Qty: 90 TABLET | Refills: 1 | Status: SHIPPED | OUTPATIENT
Start: 2023-09-27 | End: 2024-02-23 | Stop reason: SDUPTHER

## 2023-09-27 RX ORDER — DOXYCYCLINE HYCLATE 100 MG
100 TABLET ORAL 2 TIMES DAILY
Qty: 56 TABLET | Refills: 0 | Status: SHIPPED | OUTPATIENT
Start: 2023-09-27 | End: 2023-10-25

## 2023-09-27 NOTE — PROGRESS NOTES
Patient ID: Azael Hodge 33 y.o.     Chief Complaint:   Chief Complaint   Patient presents with    Follow-up     B20        HPI:    Azael is a 32 y/o AAM here for AIDS f/u. MSM. Dx 3/7/16. HLA  negative. Hospitalized from 10/19/21-11/9/21 with a diagnosis of AIDS with PJP and Disseminated MAC. Treated for disseminated MAC from 11/19/22 through 11/19/22. Pt reports adherence to Triumeq. Denies fever, headache, chills, visual problems, N/V/D, SOB, cough, chest pain or abd pain. Reviewed labs from 6/29/23 HIV VL ND, CD4 573,  dils, creatinine 1.41. Hx of syphilis. Treated with Bicillin LA 2.4 million units x 1 3/1/19.  RPR titer at that time was 1:128. Treated again 3/30/23 with Bicillin LA 2.4 million units x 1. Repeat RPR done 6/29/23 RPR 1:128. I was unable to reach patient with results so he was not retreated.  Pt reports adherence to Ergocalciferol 50,000 units once weekly. He is followed by Dr. Cage for mediastinal lymphadenopathy that was noted during hospitalization 11/20/21-11/30/21.  CT thorax/abd/pelvis done 4/28/22 showed improvement of lymphadenopathy. Repeat CT chest is scheduled for 10/3/23 with an appt with Dr. Cage on 10/5/23. Pt attended eye appt 7/12/23. He is due for a Gardasil 9 # 2 and updated COVID vaccine. He is only amenable to Gardasil 9.             Past Medical History:   Diagnosis Date    ADHD (attention deficit hyperactivity disorder)     Ages 5 through 13.  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 No pertinent surgical history.     Social History     Socioeconomic History    Marital status: Single   Occupational History     Employer: [employer]   Tobacco Use    Smoking status: Never    Smokeless tobacco: Never   Substance and Sexual Activity    Alcohol use: No    Drug use: Never    Sexual activity: Yes     Partners: Male        Family History   Problem Relation Age of Onset     "Hypertension Mother     Migraines Father     Hypertension Father     Anxiety disorder Father     Diabetes Brother     Hypertension Brother         Review of patient's allergies indicates:   Allergen Reactions    Banana      Other reaction(s): Pharyngeal edema    Benzodiazepines Hallucinations        Immunization History   Administered Date(s) Administered    DTP 1989, 1989, 01/29/1990, 10/18/1990, 07/29/1993    HIB 10/18/1990    HPV 9-Valent 06/29/2023, 09/27/2023    Hepatitis A, Adult 05/26/2016    Hepatitis B 01/02/2001, 02/08/2001, 07/09/2001    Hepatitis B, Adult 03/28/2016    Hepatitis B, Pediatric/Adolescent 04/27/1995, 06/02/1995, 11/10/1995    Influenza - Quadrivalent 10/19/2016    Influenza - Quadrivalent - PF (6-35 months) 11/13/2017, 03/01/2019    Influenza - Quadrivalent - PF *Preferred* (6 months and older) 11/04/2019    Influenza - Trivalent - PF (ADULT) 11/13/2017    MMR 10/18/1990, 07/29/1993    Meningococcal Conjugate (MCV4P) 04/06/2011, 03/01/2019, 11/04/2019    OPV 1989, 1989, 10/18/1990, 07/29/1993    Pneumococcal Conjugate - 13 Valent 05/26/2016    Pneumococcal Polysaccharide - 23 Valent 08/02/2016, 10/26/2022    Td (ADULT) 01/02/2001    Tdap 08/01/2007, 08/02/2016        Review of Systems   Constitutional: Negative.    HENT: Negative.     Eyes: Negative.    Respiratory: Negative.     Cardiovascular: Negative.    Gastrointestinal: Negative.    Genitourinary: Negative.    Musculoskeletal: Negative.    Skin: Negative.    Neurological: Negative.    Endo/Heme/Allergies: Negative.    Psychiatric/Behavioral: Negative.     All other systems reviewed and are negative.         Objective:      /75   Pulse 67   Temp 98.2 °F (36.8 °C)   Resp 14   Ht 5' 6" (1.676 m)   Wt 74.6 kg (164 lb 6.4 oz)   BMI 26.53 kg/m²      Physical Exam  Vitals reviewed.   Constitutional:       General: He is not in acute distress.     Appearance: Normal appearance.   HENT:      Head: " Normocephalic.      Right Ear: External ear normal.      Left Ear: External ear normal.      Nose: Nose normal.      Mouth/Throat:      Mouth: Mucous membranes are moist.      Pharynx: Oropharynx is clear.   Eyes:      General: No scleral icterus.     Extraocular Movements: Extraocular movements intact.      Conjunctiva/sclera: Conjunctivae normal.      Pupils: Pupils are equal, round, and reactive to light.   Cardiovascular:      Rate and Rhythm: Normal rate and regular rhythm.      Pulses: Normal pulses.      Heart sounds: Normal heart sounds.   Pulmonary:      Effort: Pulmonary effort is normal. No respiratory distress.      Breath sounds: Normal breath sounds.   Abdominal:      General: Bowel sounds are normal. There is no distension.      Palpations: Abdomen is soft. There is no mass.      Tenderness: There is no abdominal tenderness. There is no right CVA tenderness or left CVA tenderness.      Hernia: No hernia is present.   Musculoskeletal:         General: No tenderness or signs of injury. Normal range of motion.      Cervical back: Normal range of motion and neck supple.      Right lower leg: No edema.      Left lower leg: No edema.   Lymphadenopathy:      Cervical: No cervical adenopathy.   Skin:     General: Skin is warm and dry.      Capillary Refill: Capillary refill takes less than 2 seconds.      Findings: No erythema or lesion.   Neurological:      General: No focal deficit present.      Mental Status: He is alert and oriented to person, place, and time. Mental status is at baseline.   Psychiatric:         Mood and Affect: Mood normal.         Behavior: Behavior normal.         Thought Content: Thought content normal.         Judgment: Judgment normal.          Labs:   Lab Results   Component Value Date    WBC 3.91 (L) 09/27/2023    HGB 14.2 09/27/2023    HCT 43.5 09/27/2023    MCV 91.8 09/27/2023     09/27/2023       CMP  Sodium   Date Value Ref Range Status   03/14/2014 138 136 - 145 mmol/L  Final     Sodium Level   Date Value Ref Range Status   06/29/2023 138 136 - 145 mmol/L Final     Potassium   Date Value Ref Range Status   03/14/2014 4.7 3.5 - 5.1 mmol/L Final     Comment:     *Slightly Hemolyzed     Potassium Level   Date Value Ref Range Status   06/29/2023 4.4 3.5 - 5.1 mmol/L Final     Chloride   Date Value Ref Range Status   03/14/2014 103 95 - 110 mmol/L Final     CO2   Date Value Ref Range Status   03/14/2014 24 23 - 29 mmol/L Final     Carbon Dioxide   Date Value Ref Range Status   06/29/2023 26 22 - 29 mmol/L Final     Glucose   Date Value Ref Range Status   03/14/2014 91 70 - 110 mg/dL Final     BUN   Date Value Ref Range Status   03/14/2014 11 6 - 20 mg/dL Final     Blood Urea Nitrogen   Date Value Ref Range Status   06/29/2023 7.8 (L) 8.9 - 20.6 mg/dL Final     Creatinine   Date Value Ref Range Status   06/29/2023 1.41 (H) 0.73 - 1.18 mg/dL Final   03/14/2014 1.2 0.5 - 1.4 mg/dL Final     Calcium   Date Value Ref Range Status   03/14/2014 9.6 8.7 - 10.5 mg/dL Final     Calcium Level Total   Date Value Ref Range Status   06/29/2023 9.4 8.4 - 10.2 mg/dL Final     Total Protein   Date Value Ref Range Status   03/14/2014 8.4 6.0 - 8.4 g/dL Final     Albumin   Date Value Ref Range Status   03/14/2014 4.7 3.5 - 5.2 g/dL Final     Albumin Level   Date Value Ref Range Status   06/29/2023 4.4 3.5 - 5.0 g/dL Final     Total Bilirubin   Date Value Ref Range Status   03/14/2014 1.5 (H) 0.1 - 1.0 mg/dL Final     Comment:     For infants and newborns, interpretation of results should be based  on gestational age, weight and in agreement with clinical  observations.  Premature Infant recommended reference ranges:  Up to 24 hours.............<8.0 mg/dL  Up to 48 hours............<12.0 mg/dL  3-5 days..................<15.0 mg/dL  6-29 days.................<15.0 mg/dL     Bilirubin Total   Date Value Ref Range Status   06/29/2023 0.6 <=1.5 mg/dL Final     Alkaline Phosphatase   Date Value Ref Range  Status   06/29/2023 109 40 - 150 unit/L Final   03/14/2014 79 55 - 135 U/L Final     AST   Date Value Ref Range Status   03/14/2014 29 10 - 40 U/L Final     Aspartate Aminotransferase   Date Value Ref Range Status   06/29/2023 26 5 - 34 unit/L Final     ALT   Date Value Ref Range Status   03/14/2014 17 10 - 44 U/L Final     Alanine Aminotransferase   Date Value Ref Range Status   06/29/2023 24 0 - 55 unit/L Final     Anion Gap   Date Value Ref Range Status   03/14/2014 11 8 - 16 mmol/L Final     eGFR   Date Value Ref Range Status   06/29/2023 >60 mls/min/1.73/m2 Final     Lab Results   Component Value Date    TSH 2.9131 10/26/2022     Hep C Ab Interp   Date Value Ref Range Status   12/21/2022 Nonreactive Nonreactive Final     Syphilis Antibody   Date Value Ref Range Status   06/29/2023 Reactive (A) Nonreactive, Equivocal Final     RPR   Date Value Ref Range Status   06/29/2023 Reactive (A) Non-Reactive Final     RPR Titer   Date Value Ref Range Status   06/29/2023 128 dils (A) (none) Final     Cholesterol   Date Value Ref Range Status   03/14/2014 118 (L) 120 - 199 mg/dL Final     Comment:     The National Cholesterol Education Program (NCEP) has set the  following guidelines (reference ranges) for Cholesterol:  Optimal.....................<200 mg/dL  Borderline High.............200-239 mg/dL  High........................> or = 240 mg/dL     Cholesterol Total   Date Value Ref Range Status   12/21/2022 194 <=200 mg/dL Final     HDL   Date Value Ref Range Status   03/14/2014 63 40 - 75 mg/dL Final     Comment:     The National Cholesterol Education Program (NCEP) has set the  following guidelines (reference values) for HDL Cholesterol:  Low...............<40 mg/dL  Optimal...........>60 mg/dL     HDL Cholesterol   Date Value Ref Range Status   12/21/2022 38 35 - 60 mg/dL Final     Triglycerides   Date Value Ref Range Status   03/14/2014 36 30 - 150 mg/dL Final     Comment:     The National Cholesterol Education  Program (NCEP) has set the  following guidelines (reference values) for triglycerides:  Normal......................<150 mg/dL  Borderline High.............150-199 mg/dL  High........................200-499 mg/dL     Triglyceride   Date Value Ref Range Status   12/21/2022 196 (H) 34 - 140 mg/dL Final     Total Cholesterol/HDL Ratio   Date Value Ref Range Status   03/14/2014 1.9 (L) 2.0 - 5.0 Final     Cholesterol/HDL Ratio   Date Value Ref Range Status   12/21/2022 5 0 - 5 Final     Very Low Density Lipoprotein   Date Value Ref Range Status   12/21/2022 39  Final     LDL Cholesterol   Date Value Ref Range Status   12/21/2022 117.00 50.00 - 140.00 mg/dL Final     Vit D 25 OH   Date Value Ref Range Status   03/28/2023 22.2 (L) 30.0 - 80.0 ng/mL Final       Imaging: Reviewed most recent relevant imaging studies available, notable results highlighted in this note    Medications:     Current Outpatient Medications   Medication Instructions    doxycycline (VIBRA-TABS) 100 mg, Oral, 2 times daily    ergocalciferol (VITAMIN D2) 50,000 Units, Oral, Every 7 days    TRIUMEQ 600- mg Tab 1 tablet, Oral, Daily       Assessment:       Problem List Items Addressed This Visit    None  Visit Diagnoses       AIDS    -  Primary    Relevant Medications    TRIUMEQ 600- mg Tab    Other Relevant Orders    HIV-1 RNA, Quantitative, PCR with Reflex to Genotype    CD4 Lymphocytes    CBC Auto Differential (Completed)    Comprehensive Metabolic Panel    Vitamin D deficiency        Relevant Medications    ergocalciferol (VITAMIN D2) 50,000 unit Cap    Other Relevant Orders    Vitamin D    Syphilis        Relevant Medications    doxycycline (VIBRA-TABS) 100 MG tablet    Other Relevant Orders    SYPHILIS ANTIBODY (WITH REFLEX RPR)    Renal insufficiency        Immunization due        Relevant Orders    HPV Vaccine (9-Valent) (3 Dose) (IM) (Completed)               Plan:      AIDS  -     TRIUMEQ 600- mg Tab; Take 1 tablet by mouth  once daily.  Dispense: 90 tablet; Refill: 1  -     HIV-1 RNA, Quantitative, PCR with Reflex to Genotype; Future; Expected date: 09/27/2023  -     CD4 Lymphocytes; Future; Expected date: 09/27/2023  -     CBC Auto Differential; Future; Expected date: 09/27/2023  -     Comprehensive Metabolic Panel; Future; Expected date: 09/27/2023    Vitamin D deficiency  -     ergocalciferol (VITAMIN D2) 50,000 unit Cap; Take 1 capsule (50,000 Units total) by mouth every 7 days.  Dispense: 12 capsule; Refill: 1  -     Vitamin D; Future; Expected date: 09/27/2023  Continue meds. Pt educated on the importance of Vit D to bone and organ health.     Syphilis  -     doxycycline (VIBRA-TABS) 100 MG tablet; Take 1 tablet (100 mg total) by mouth 2 (two) times daily.  Dispense: 56 tablet; Refill: 0  -     SYPHILIS ANTIBODY (WITH REFLEX RPR); Future; Expected date: 09/27/2023  Syphilis hx:  Baseline RPR 1:128 3/1/19  Treated with Bicillin LA 2.4 million units x 1 3/1/19  RPR 1:128 3/29/23  Treated with Bicillin LA 2.4 million units x 1 3/30/23  Repeat RPR 1:128 6/29/23  Plan to retreat with Doxycycline 100 mg 1 po BID X 28 days due to a nationwide shortage of Bicillin to ensure patient has been treated appropriately as this could be a reinfection  Protection with condoms advised     Renal insufficiency  Keep hydrated.  Avoid NSAIDs (ibuprofen, naproxen, aleve, advil, toradol or mobic)  Keep BP (goal <130/80) and cholesterol (LDL <100) within recommended goals  Low sodium diet encouraged; 2 grams per day  Increase exercise activity; 30 minutes 3-5 x per week   Maintain a healthy weight  Smoking cessation encouraged   Decrease ETOH intake/encouraged cessation   Medication compliance stressed   Keep all follow up appointments as scheduled     Immunization due  -     HPV Vaccine (9-Valent) (3 Dose) (IM)

## 2023-09-28 LAB
HIV1 RNA # PLAS NAA DL=20: <20 COPIES/ML
RPR SER QL: REACTIVE
RPR SER-TITR: ABNORMAL {TITER}

## 2023-09-30 LAB
AGE: 34
CD3+CD4+ CELLS # SPEC: 508 UNIT/L (ref 589–1505)
CD3+CD4+ CELLS NFR BLD: 22.6 %
LYMPHOCYTES # BLD AUTO: 2248.25 X10(3)/MCL (ref 1260–5520)
LYMPHOCYTES NFR LN MANUAL: 57.5 % (ref 28–48)
LYMPHOMA - T-CELL MARKERS SPEC-IMP: ABNORMAL
WBC # BLD AUTO: 3910 /MM3 (ref 4500–11500)

## 2023-10-03 ENCOUNTER — HOSPITAL ENCOUNTER (OUTPATIENT)
Dept: RADIOLOGY | Facility: HOSPITAL | Age: 33
Discharge: HOME | End: 2023-10-03
Attending: INTERNAL MEDICINE
Payer: MEDICAID

## 2023-10-03 DIAGNOSIS — R59.1 LYMPHADENOPATHY: ICD-10-CM

## 2023-10-03 DIAGNOSIS — R59.0 LOCALIZED ENLARGED LYMPH NODES: ICD-10-CM

## 2023-10-03 PROCEDURE — 25500020 PHARM REV CODE 255: Performed by: INTERNAL MEDICINE

## 2023-10-03 PROCEDURE — 74177 CT ABD & PELVIS W/CONTRAST: CPT | Mod: TC

## 2023-10-03 PROCEDURE — 71260 CT THORAX DX C+: CPT | Mod: TC

## 2023-10-03 RX ADMIN — DIATRIZOATE MEGLUMINE AND DIATRIZOATE SODIUM 30 ML: 660; 100 LIQUID ORAL; RECTAL at 01:10

## 2023-10-03 RX ADMIN — IOPAMIDOL 100 ML: 755 INJECTION, SOLUTION INTRAVENOUS at 02:10

## 2023-10-05 ENCOUNTER — OFFICE VISIT (OUTPATIENT)
Dept: HEMATOLOGY/ONCOLOGY | Facility: CLINIC | Age: 33
End: 2023-10-05
Payer: MEDICAID

## 2023-10-05 VITALS
OXYGEN SATURATION: 98 % | RESPIRATION RATE: 18 BRPM | TEMPERATURE: 98 F | HEIGHT: 66 IN | WEIGHT: 166 LBS | SYSTOLIC BLOOD PRESSURE: 128 MMHG | BODY MASS INDEX: 26.68 KG/M2 | DIASTOLIC BLOOD PRESSURE: 77 MMHG | HEART RATE: 71 BPM

## 2023-10-05 DIAGNOSIS — R59.1 LYMPHADENOPATHY: Primary | ICD-10-CM

## 2023-10-05 DIAGNOSIS — R59.0 LOCALIZED ENLARGED LYMPH NODES: ICD-10-CM

## 2023-10-05 PROCEDURE — 99999 PR PBB SHADOW E&M-EST. PATIENT-LVL IV: CPT | Mod: PBBFAC,,, | Performed by: INTERNAL MEDICINE

## 2023-10-05 PROCEDURE — 3074F PR MOST RECENT SYSTOLIC BLOOD PRESSURE < 130 MM HG: ICD-10-PCS | Mod: CPTII,,, | Performed by: INTERNAL MEDICINE

## 2023-10-05 PROCEDURE — 99214 OFFICE O/P EST MOD 30 MIN: CPT | Mod: S$PBB,,, | Performed by: INTERNAL MEDICINE

## 2023-10-05 PROCEDURE — 99214 OFFICE O/P EST MOD 30 MIN: CPT | Mod: PBBFAC | Performed by: INTERNAL MEDICINE

## 2023-10-05 PROCEDURE — 3008F PR BODY MASS INDEX (BMI) DOCUMENTED: ICD-10-PCS | Mod: CPTII,,, | Performed by: INTERNAL MEDICINE

## 2023-10-05 PROCEDURE — 3008F BODY MASS INDEX DOCD: CPT | Mod: CPTII,,, | Performed by: INTERNAL MEDICINE

## 2023-10-05 PROCEDURE — 99214 PR OFFICE/OUTPT VISIT, EST, LEVL IV, 30-39 MIN: ICD-10-PCS | Mod: S$PBB,,, | Performed by: INTERNAL MEDICINE

## 2023-10-05 PROCEDURE — 1159F PR MEDICATION LIST DOCUMENTED IN MEDICAL RECORD: ICD-10-PCS | Mod: CPTII,,, | Performed by: INTERNAL MEDICINE

## 2023-10-05 PROCEDURE — 99999 PR PBB SHADOW E&M-EST. PATIENT-LVL IV: ICD-10-PCS | Mod: PBBFAC,,, | Performed by: INTERNAL MEDICINE

## 2023-10-05 PROCEDURE — 3078F DIAST BP <80 MM HG: CPT | Mod: CPTII,,, | Performed by: INTERNAL MEDICINE

## 2023-10-05 PROCEDURE — 1159F MED LIST DOCD IN RCRD: CPT | Mod: CPTII,,, | Performed by: INTERNAL MEDICINE

## 2023-10-05 PROCEDURE — 3078F PR MOST RECENT DIASTOLIC BLOOD PRESSURE < 80 MM HG: ICD-10-PCS | Mod: CPTII,,, | Performed by: INTERNAL MEDICINE

## 2023-10-05 PROCEDURE — 3074F SYST BP LT 130 MM HG: CPT | Mod: CPTII,,, | Performed by: INTERNAL MEDICINE

## 2023-10-05 NOTE — PROGRESS NOTES
HEMATOLOGY/ONCOLOGY OFFICE CLINIC VISIT    Visit Information:    Initial Evaluation: 11/30/2021  [facility]  Other providers:  Code status:    Diagnosis:  Lymphadenopathy  HIV +    Present treatment: N/A    Treatment/Oncology history: N/A    Plan: surveillance    Imaging:  CT angiogram 11/20/2021:No evidence of pulmonary thromboembolism. Enlarged mediastinal lymph nodes extending into the bilateral vj. On the right largest conglomeration of lymph nodes measures approximately 1.8 x 3.3 cm. This concepcion prominence is seen throughout the mediastinum as well as enlarged lymph node in the left neck measuring 1.3 cm. Pulmonary findings have resolved in the interval with mediastinal adenopathy now present. Findings are most likely reactive from recent infectious process, however recommend continued short interval follow-up to resolution to exclude lymphoma.   CT Thorax 1/19/2022: Clustered solid nodules within the posterior portion of the right lower lobe. Individual nodules measure up to 6 mm. Additional scattered small solid nodules within the remaining lobes, increased in number since prior study.  Right hilar lymph node measures 15 mm in short axis, stable. A left hilar lymph node also measures 15 mm in short axis, slightly smaller since n. Subcarinal lymph node measures 20 mm stable. No new lymphadenopathy appreciated.  Left lower cervical lymph node measuring 15 mm in short axis , not significantly changed. A right lower cervical lymph node measures 13 mm, stable. Retrocrural lymph nodes are mildly larger since prior exam. Reference right retrocrural lymph node now measures 12 mm, previously 8 mm. There is a partially visualized concepcion conglomerate between the left kidney and pancreas measuring about 50 mm in transverse diameter.    CT C/A/P 5/3/2022: Resolution of right lower lobe nodules seen on prior CT.  No enlarging pulmonary nodule or consolidation on current exam. Reference right hilar lymph  node measures 9 mm in short axis, decreased from 22 mm before.  Reference left hilar lymph node also smaller, measuring about 6 mm in short axis.  Subcarinal lymph node has also decreased in size, now measuring 7 mm in short axis compared to 25 mm before.  No new lymphadenopathy identified. Abdominal/pelvic lymph nodes: Significant improvement in gastrohepatic, retrocrural and retroperitoneal lymphadenopathy since November.  For reference, a left periaortic lymph node now measures 9 mm in short axis, previously 20 mm.  Impression: 1. Improved lymphadenopathy since prior exams.  No new pathologically enlarged lymph nodes identified. 2. Decreased number of pulmonary nodules since 01/19/2022.  CT C/A/P 11/29/22:  Impression: Scattered lymph nodes in the periaortic region, bilateral pelvis, and inguinal regions bilaterally which are centrally stable in the interval.  No intrathoracic lymphadenopathy or pulmonary nodules. Overall no interval change from the prior.  CT C/A/P 10/3/2023:  Mildly prominent bilateral axillary and inguinal lymph nodes which have enlarged from the prior exam, however demonstrate a preserved fatty hilum.  These are indeterminate on this exam.          CLINICAL HISTORY:       Patient  Azael Hodge is a 33 y.o. male with multiple medical problems including HIV/AIDS d/c from the hospital 11/23/2021 and returned to the ER on 11/25/2021 with abd pain and lower back pain, also reports nausea and constipation, denies any vomiting. Pt is a poor historian and has difficulty describing what is wrong with him. Pt recently admitted for pneumonia. Pt denies any chest pain or SOB. He also presented  with fever.    CT angiogram 11/20/2021 done when patient presented with SOB on previous hospitalization showed enlarged mediastinal lymph nodes extending into the bilateral vj. On the right largest conglomeration of lymph nodes measures approximately 1.8 x 3.3 cm. This concepcion prominence is seen throughout  the mediastinum as well as enlarged lymph node in the left neck measuring 1.3 cm. No pulmonary embolism.    Upon evaluation at the ER, CT A/P 11/25/2021: There are multiple enlarged discrete lymph nodes at the juan antonio hepatis, celiac region, root of the mesentery, retrocrural space and retroperitoneum, the largest measures approximately 3.7 cm in the left paraaortic region. Consider possible lymphoma. There is trace pericholecystic fluid. No radiodense gallstone or wall edema is seen. Considerations include early acute cholecystitis and reaction to adjacent hepatitis. Ultrasound may be helpful for more definitive characterization if needed. Mild hepatomegaly. There is some concentric mural thickening of the anorectum which may represent proctitis.     Of note CT C/A/P 10/19/2021 with Findings consistent with bilateral atypical pneumonia. Lymph nodes: There are scattered reactive-sized mediastinal lymph nodes. No pathologic concepcion enlargement or necrotic adenopathy. Otherwise, no evidence of acute or suspicious focal abnormality within the chest, abdomen, or pelvis. Incidentally noted gallbladder sludge without calcified stone or biliary obstruction.    Blood work with normal WBCs, he was hyponatremic with a sodium of 122, acidotic with a CO2 of 15, lactic acid 1 and lipase 18.  He was tachycardic but normotensive.  UA with trace of leukocyte esterase, 2+ protein, 1+ glucose and 1+ bilirubin.  Covid rapid test was negative.  CD4 on 11/18/2021 was 101.  Patient has been followed by infectious disease services.    We were consulted to evaluate for possible lymphoma.  Patient is seen in rounds and he is laying in bed.  He is by himself.  He looks chronically ill.  Abdomen is distended but he reports that pain is much improved. Patient had 1 unit of blood transfusion on 11/28/2021 for a hemoglobin of 7.6.  Today hemoglobin 9.3.  Hyponatremia improved.  Today sodium 134, CO2 20.     He has been afebrile for 24 Hours now.         Chief Complaint: OTHER (No concerns today.)        Interval History:  Patient presents today for follow up to discuss CT scan results. He is doing well. No complaints today. No fever, chills or sweats. No bleeding. No chest pain or shortness of breath. Right axillary LN palpated and is about 2 cm. It was mobile and tender.   Discussed with the patient in detailed CT. LN only on axillary and inguinal area. Look benign on imaging.      Past Medical History:   Diagnosis Date    ADHD (attention deficit hyperactivity disorder)     Ages 5 through 13.  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     Pneumonia, unspecified organism     Syphilis, unspecified       History reviewed. No pertinent surgical history.  Family History   Problem Relation Age of Onset    Hypertension Mother     Migraines Father     Hypertension Father     Anxiety disorder Father     Diabetes Brother     Hypertension Brother      Social Connections: Not on file       Review of patient's allergies indicates:   Allergen Reactions    Banana      Other reaction(s): Pharyngeal edema    Benzodiazepines Hallucinations      Current Outpatient Medications on File Prior to Visit   Medication Sig Dispense Refill    doxycycline (VIBRA-TABS) 100 MG tablet Take 1 tablet (100 mg total) by mouth 2 (two) times daily. 56 tablet 0    ergocalciferol (VITAMIN D2) 50,000 unit Cap Take 1 capsule (50,000 Units total) by mouth every 7 days. 12 capsule 1    TRIUMEQ 600- mg Tab Take 1 tablet by mouth once daily. 90 tablet 1     No current facility-administered medications on file prior to visit.      Review of Systems   Constitutional:  Negative for activity change, appetite change, chills, diaphoresis, fatigue, fever and unexpected weight change.   HENT:  Negative for nasal congestion, mouth sores, nosebleeds, postnasal drip, sinus pressure/congestion, sore throat and trouble swallowing.    Eyes:  Negative for visual disturbance.   Respiratory:  Negative for cough and  "shortness of breath.    Cardiovascular:  Negative for chest pain and palpitations.   Gastrointestinal:  Negative for abdominal distention, abdominal pain, blood in stool, change in bowel habit, constipation, diarrhea, nausea and vomiting.   Endocrine: Negative.    Genitourinary:  Negative for bladder incontinence, decreased urine volume, difficulty urinating, dysuria, frequency, hematuria, scrotal swelling, testicular pain and urgency.   Musculoskeletal:  Negative for arthralgias, back pain, gait problem, joint swelling, leg pain, myalgias and neck pain.   Integumentary:  Negative for rash.   Neurological:  Negative for dizziness, tremors, seizures, syncope, speech difficulty, weakness, light-headedness, numbness, headaches and memory loss.   Hematological:  Negative for adenopathy. Does not bruise/bleed easily.   Psychiatric/Behavioral:  Negative for agitation, confusion, hallucinations, sleep disturbance and suicidal ideas. The patient is not nervous/anxious.           Vitals:    10/05/23 1533   BP: 128/77   BP Location: Left arm   Patient Position: Sitting   Pulse: 71   Resp: 18   Temp: 98 °F (36.7 °C)   TempSrc: Oral   SpO2: 98%   Weight: 75.3 kg (166 lb)   Height: 5' 6" (1.676 m)          Physical Exam  Vitals and nursing note reviewed.   Constitutional:       General: He is not in acute distress.     Appearance: Normal appearance.   HENT:      Head: Normocephalic and atraumatic.      Mouth/Throat:      Mouth: Mucous membranes are moist.   Eyes:      General: No scleral icterus.     Extraocular Movements: Extraocular movements intact.      Conjunctiva/sclera: Conjunctivae normal.   Neck:      Vascular: No JVD.   Cardiovascular:      Rate and Rhythm: Normal rate and regular rhythm.      Heart sounds: No murmur heard.  Pulmonary:      Effort: Pulmonary effort is normal.      Breath sounds: Normal breath sounds. No wheezing or rhonchi.   Chest:      Chest wall: No tenderness.   Abdominal:      General: Bowel " sounds are normal. There is no distension.      Palpations: Abdomen is soft.      Tenderness: There is no abdominal tenderness.   Musculoskeletal:         General: No swelling or deformity.      Cervical back: Neck supple.   Lymphadenopathy:      Cervical: No cervical adenopathy.      Upper Body:      Right upper body: No supraclavicular or axillary adenopathy.      Left upper body: No supraclavicular or axillary adenopathy.      Lower Body: No right inguinal adenopathy. No left inguinal adenopathy.   Skin:     General: Skin is warm.      Coloration: Skin is not jaundiced.      Findings: No rash.   Neurological:      General: No focal deficit present.      Mental Status: He is alert and oriented to person, place, and time.   Psychiatric:         Attention and Perception: Attention normal.         Mood and Affect: Mood and affect normal.         Behavior: Behavior is cooperative.         Cognition and Memory: Cognition normal.         Judgment: Judgment normal.       ECOG SCORE             Laboratory:  CBC with Differential:  @[id number](WBC,NEUTROPCT,LYMPH,MONOPCT,EOSPCT,BASOPHIL,RBC,HCT,HGB,MCV,MCH,MCMC,RDW,PLT,MPV)@  CMP:  @[id number](Glu,Calcium,Albumin,PROT,NA,K,CO2,CL,BUN,Creatinine,Alkphos,ALT,AST,Bilitot)@  BMP: @[id number](GLU,Calcium,NA,K,CO2,CL,BUN,Creatinine)@  LFTs: @[id number](ALT,AST,ALKPHOS,BILITOT,PROT,ALBUMIN)@  Haptoglobin: @FEKCMGARM33(HAPTOGLOBIN)@  Tumor Markers: @[id number](PSA,CEA,,ALGTM,AFPTM,AV1315,)@  Immunology: @[id number](SPEP,CYNDIE,AURORA,FREELAMBDALI)@  Coagulation: @UKUBHJXWY74(PT,INR,APTT)@  Specimen (24h ago, onward)      None          Microbiology Results (last 7 days)       ** No results found for the last 168 hours. **            Imaging:  CT angiogram 11/20/2021:  PULMONARY ARTERY:No evidence of pulmonary thromboembolism.  AORTA: Normal in course and caliber.  THYROID GLAND: The visualized thyroid is unremarkable.  AIRWAYS: Trachea is midline and tracheobronchial tree  is patent.   HEART: The heart is normal in size. There is no pericardial effusion.   LUNGS: There are no new masses or nodules identified. No pleural effusion or pneumothorax.  LYMPH NODES: Enlarged mediastinal lymph nodes extending into the bilateral vj. On the right largest conglomeration of lymph nodes measures approximately 1.8 x 3.3 cm. This concepcion prominence is seen throughout the mediastinum as well as enlarged lymph node in the left neck measuring 1.3 cm (series 4 image 13).  BONES: No displaced fracture. No aggressive appearing osseous lesion identified.  UPPER ABDOMEN: The visualized abdomen is unremarkable.  Impression  No evidence of pulmonary thromboembolism.      Pulmonary findings have resolved in the interval with mediastinal adenopathy now present. Findings are most likely reactive from recent infectious process, however recommend continued short interval follow-up to resolution to exclude lymphoma.    1. Mediastinal, hilar and lower cervical lymphadenopathy with stable overall appearance since November. However, retrocrural lymph nodes are mildly larger with suspected mild enlargement of a partially visualized concepcion conglomerate between the left kidney and pancreas.  2. Increased number of subcentimeter pulmonary nodules since prior CT including clustered nodules in the posterior right lower lobe. These are likely infectious or inflammatory in nature. 2-3 month follow-up chest CT suggested to confirm resolution.       Assessment:       HIV/AIDS  Lymphadenopathy--no palpable lymph nodes. If need biopsy need to be from a larger LN (> 1 cm)--most likely reactive, benign  Anemia--stable  Leukopenia--stable  Abdominal pain--resolved  Recent PJP pneumonia, Syphilis and disseminated MAC   Pulmonary nodule--Increase in number but very small--subcm--> Infectious??--> resolved        Plan:       Patient with history of HIV/AIDS presented with abdominal pain, fever, and lymphadenopathy.  Recently discharged  from the hospital for PJP pneumonia and disseminated MAC.  Is currently on rifampin, ethambutol and clarithromycin.  He is also on ART therapy for HIV that was restarted on 10/11/2021.  He is also on dolutegravir since he started Rifampin.  He completed treatment course of Bactrim for his PJP on 11/8/2021.  Blood cultures negative.  He did have blood cultures positive for MAC during the 10/19/2021 hospitalization.  He is on so on prednisone that he will need to continue for at least 2 more weeks.    Patient lymphadenopathy is relatively acute as this lymphadenopathy was not as prominent back in October of this year.  Most likely related to his disease/infection but lymphoma is on the differential. Patient reluctant to undergo any type of procedure, excisional lymph node biopsy, at this time.  He would like to give time to the medications to work and reevaluate in couple of months.  Previous CT with resolution of pulmonary nodules and significant improvement of his lymphadenopathy.  Most recent CT with increase in size of axillary and inguinal LN.  Right axillary LN palpated and is about 2 cm. It was mobile and tender. Look benign on imaging.   Again this most likely secondary to his HIV rather than to lymphoma or any other malignancy. LAD reactive, benign We will continue to follow for now.    If LAD worsen he will need Excisional LN biopsy to r/o AIDS related lymphoma  Continue ART Rx as per ID service  RTC in 3 months with MD after PET CT, same day CBC, CMP  If PET CT not covered by insurance, will change to CT C/A/P -patient informed    Encouraged to call for any questions or problems.  The patient was given ample opportunity to ask questions and they were all answered to satisfaction; patient demonstrated understanding of what we discussed and is agreeable to the plan.    AYANA QUARLES MD      Professional Services   I, Valorie Collins LPN, acted solely as a scribe for and in the presence of Dr. Bower  Fauzia, who performed these services.

## 2024-01-04 ENCOUNTER — CLINICAL SUPPORT (OUTPATIENT)
Dept: INFECTIOUS DISEASES | Facility: CLINIC | Age: 34
End: 2024-01-04
Payer: COMMERCIAL

## 2024-01-04 DIAGNOSIS — Z23 IMMUNIZATION DUE: Primary | ICD-10-CM

## 2024-01-04 PROCEDURE — 90651 9VHPV VACCINE 2/3 DOSE IM: CPT | Mod: PBBFAC

## 2024-01-04 PROCEDURE — 90471 IMMUNIZATION ADMIN: CPT | Mod: PBBFAC

## 2024-01-04 RX ADMIN — HUMAN PAPILLOMAVIRUS 9-VALENT VACCINE, RECOMBINANT 0.5 ML: 30; 40; 60; 40; 20; 20; 20; 20; 20 INJECTION, SUSPENSION INTRAMUSCULAR at 10:01

## 2024-01-05 ENCOUNTER — HOSPITAL ENCOUNTER (OUTPATIENT)
Dept: RADIOLOGY | Facility: HOSPITAL | Age: 34
Discharge: HOME | End: 2024-01-05
Attending: INTERNAL MEDICINE
Payer: COMMERCIAL

## 2024-01-05 DIAGNOSIS — R59.1 LYMPHADENOPATHY: ICD-10-CM

## 2024-01-05 DIAGNOSIS — R59.0 LOCALIZED ENLARGED LYMPH NODES: ICD-10-CM

## 2024-01-05 PROCEDURE — A9552 F18 FDG: HCPCS

## 2024-01-05 PROCEDURE — 78815 PET IMAGE W/CT SKULL-THIGH: CPT | Mod: TC

## 2024-01-08 ENCOUNTER — TELEPHONE (OUTPATIENT)
Dept: HEMATOLOGY/ONCOLOGY | Facility: CLINIC | Age: 34
End: 2024-01-08
Payer: COMMERCIAL

## 2024-01-09 ENCOUNTER — OFFICE VISIT (OUTPATIENT)
Dept: INTERNAL MEDICINE | Facility: CLINIC | Age: 34
End: 2024-01-09
Payer: COMMERCIAL

## 2024-01-09 ENCOUNTER — PATIENT MESSAGE (OUTPATIENT)
Dept: INTERNAL MEDICINE | Facility: CLINIC | Age: 34
End: 2024-01-09

## 2024-01-09 VITALS
HEART RATE: 76 BPM | BODY MASS INDEX: 26.52 KG/M2 | SYSTOLIC BLOOD PRESSURE: 134 MMHG | TEMPERATURE: 98 F | RESPIRATION RATE: 16 BRPM | WEIGHT: 165 LBS | DIASTOLIC BLOOD PRESSURE: 82 MMHG | HEIGHT: 66 IN

## 2024-01-09 DIAGNOSIS — Z00.00 WELLNESS EXAMINATION: Primary | ICD-10-CM

## 2024-01-09 PROCEDURE — 3008F BODY MASS INDEX DOCD: CPT | Mod: CPTII,,, | Performed by: NURSE PRACTITIONER

## 2024-01-09 PROCEDURE — 3079F DIAST BP 80-89 MM HG: CPT | Mod: CPTII,,, | Performed by: NURSE PRACTITIONER

## 2024-01-09 PROCEDURE — 1160F RVW MEDS BY RX/DR IN RCRD: CPT | Mod: CPTII,,, | Performed by: NURSE PRACTITIONER

## 2024-01-09 PROCEDURE — 99213 OFFICE O/P EST LOW 20 MIN: CPT | Mod: PBBFAC | Performed by: NURSE PRACTITIONER

## 2024-01-09 PROCEDURE — 3075F SYST BP GE 130 - 139MM HG: CPT | Mod: CPTII,,, | Performed by: NURSE PRACTITIONER

## 2024-01-09 PROCEDURE — 1159F MED LIST DOCD IN RCRD: CPT | Mod: CPTII,,, | Performed by: NURSE PRACTITIONER

## 2024-01-09 PROCEDURE — 99395 PREV VISIT EST AGE 18-39: CPT | Mod: S$PBB,,, | Performed by: NURSE PRACTITIONER

## 2024-01-09 NOTE — PROGRESS NOTES
KASIA Kirkpatrick   [facility]  [address] [facility] [id number]      PATIENT NAME: Azael Hodge  : 1990  DATE: 24  MRN: [id number]      Patient PCP Information       Provider PCP Type    KASIA Kirkpatrick General            Reason for Visit / Chief Complaint: Health Maintenance       History of Present Illness / Problem Focused Workflow     Azael Hodge presents to the clinic with Health Maintenance     34 yo AAM here today for routine wellness OV. Medical Problems include HIV. Followed by Oncology / Hematology Dr. Cage for hx of lymphadenopathy with leukocytosis.     Osteoporosis Screening - 23 Vitamin D level 27.1  STI Screening - [facility]  Wellness Screening - 2024  Pt here today for yearly wellness OV, is followed closely by other specialties, Pt is agreeable today to a few labs for routine wellness f/u, will f/u with results in portal. Overall reports feeling good, denies any acute issues. HM topics reviewed with no questions or concerns. Will f/u in 1 year for routine wellness and prn.   Denies chest pain, shortness of breath, cough, fever, headache, dizziness, weakness, abdominal pain, nausea, vomiting, diarrhea, constipation, black/bloody stools, unplanned weight loss, night sweats, changes in urinary patterns, burning/odor with urination, depression, anxiety, and SI/HI.             Review of Systems     Review of Systems   Constitutional: Negative.    HENT: Negative.     Eyes: Negative.    Respiratory: Negative.     Cardiovascular: Negative.    Gastrointestinal: Negative.    Endocrine: Negative.    Genitourinary: Negative.    Neurological: Negative.    Psychiatric/Behavioral: Negative.           Medications and Allergies     Medications  Current Outpatient Medications   Medication Instructions    ergocalciferol (VITAMIN D2) 50,000 Units, Oral, Every 7 days    TRIUMEQ 600- mg  "Tab 1 tablet, Oral, Daily         Allergies  Review of patient's allergies indicates:   Allergen Reactions    Banana      Other reaction(s): Pharyngeal edema    Benzodiazepines Hallucinations       Physical Examination     Visit Vitals  /82   Pulse 76   Temp 98 °F (36.7 °C)   Resp 16   Ht 5' 6" (1.676 m)   Wt 74.8 kg (165 lb)   BMI 26.63 kg/m²       Physical Exam  Vitals reviewed.   Constitutional:       Appearance: Normal appearance. He is normal weight.   HENT:      Head: Normocephalic.   Cardiovascular:      Rate and Rhythm: Normal rate and regular rhythm.      Pulses: Normal pulses.      Heart sounds: Normal heart sounds.   Pulmonary:      Effort: Pulmonary effort is normal.      Breath sounds: Normal breath sounds.   Abdominal:      General: Abdomen is flat.      Palpations: Abdomen is soft.   Musculoskeletal:         General: Normal range of motion.      Cervical back: Normal range of motion.   Skin:     General: Skin is warm and dry.   Neurological:      Mental Status: He is alert.   Psychiatric:         Mood and Affect: Mood normal.           Results       Assessment        ICD-10-CM ICD-9-CM   1. Wellness examination  Z00.00 V70.0        Plan      Problem List Items Addressed This Visit    None  Visit Diagnoses       Wellness examination    -  Primary    Relevant Orders    TSH    Hemoglobin A1C    Lipid Panel            Future Appointments   Date Time Provider Department Center   1/26/2024 11:00 AM Cheli Cage MD [facility]   1/29/2024  7:30 AM [facility]    9/23/2024  9:30 AM [facility] [place] [facility]        Follow up in about 1 year (around 1/16/2025) for Wellness.      Signature:     [facility]  [address] [facility] [id number]    Date of encounter: 1/9/24      "

## 2024-01-26 ENCOUNTER — OFFICE VISIT (OUTPATIENT)
Dept: HEMATOLOGY/ONCOLOGY | Facility: CLINIC | Age: 34
End: 2024-01-26
Payer: COMMERCIAL

## 2024-01-26 DIAGNOSIS — R59.0 LOCALIZED ENLARGED LYMPH NODES: ICD-10-CM

## 2024-01-26 DIAGNOSIS — R59.1 LYMPHADENOPATHY: Primary | ICD-10-CM

## 2024-01-26 DIAGNOSIS — B20 HUMAN IMMUNODEFICIENCY VIRUS (HIV) DISEASE: ICD-10-CM

## 2024-01-26 PROCEDURE — 99214 OFFICE O/P EST MOD 30 MIN: CPT | Mod: 95,,, | Performed by: INTERNAL MEDICINE

## 2024-01-26 PROCEDURE — 3044F HG A1C LEVEL LT 7.0%: CPT | Mod: CPTII,95,, | Performed by: INTERNAL MEDICINE

## 2024-01-26 PROCEDURE — 1159F MED LIST DOCD IN RCRD: CPT | Mod: CPTII,95,, | Performed by: INTERNAL MEDICINE

## 2024-01-26 PROCEDURE — 1160F RVW MEDS BY RX/DR IN RCRD: CPT | Mod: CPTII,95,, | Performed by: INTERNAL MEDICINE

## 2024-01-26 NOTE — PROGRESS NOTES
HEMATOLOGY/ONCOLOGY OFFICE CLINIC VISIT      This is a telemedicine note.  Patient was treated using telemedicine, real-time audio and video, according to [facility] protocols.      IDr. Cheli, distant provided, conducted the visit from location identified below.  The patient participated in the visit at a non-[facility] location selected by the patient (or patient's representative), identified below.  I am licensed in the [facility] where the patient stated they are located.  The patient, (or patient's representative) stated that they understood and accepted privacy and security risk to the information and her location.   I have reviewed the patient name and date of birth  I have verbally reviewed the past medical history, active medication list, and allergies with the patient (parent/ legal guardian for a patient under the age of 18 years old) and verified with picture ID if applicable . I have verified that this patient is a resident in the [facility].  I have verbally obtained review of systems    Patient was located in the [facility]  I, Dr. Cheli Cage, distant provider, was located at [facility] .    Face-to-face time spent with the patient exceeds 25 minutes, over 50% of which was used for education and counseling regarding medical conditions, current medications including risk/benefits and side effects/adverse events, over-the-counter medications uses/doses, home self-care and contact precautions, red flags and indication for immediate medical attention.  The patient is receptive, expresses understanding and is agreeable to the plan.  All questions answered.        Visit Information:    Initial Evaluation: 11/30/2021  [facility]  Other providers:  Code status:     Diagnosis:  Lymphadenopathy  HIV +     Present treatment: N/A     Treatment/Oncology history: N/A     Plan: surveillance      Imaging:  CT angiogram 11/20/2021:No evidence of pulmonary thromboembolism.  Enlarged mediastinal lymph nodes extending into the bilateral vj. On the right largest conglomeration of lymph nodes measures approximately 1.8 x 3.3 cm. This concepcion prominence is seen throughout the mediastinum as well as enlarged lymph node in the left neck measuring 1.3 cm. Pulmonary findings have resolved in the interval with mediastinal adenopathy now present. Findings are most likely reactive from recent infectious process, however recommend continued short interval follow-up to resolution to exclude lymphoma.   CT Thorax 1/19/2022: Clustered solid nodules within the posterior portion of the right lower lobe. Individual nodules measure up to 6 mm. Additional scattered small solid nodules within the remaining lobes, increased in number since prior study.  Right hilar lymph node measures 15 mm in short axis, stable. A left hilar lymph node also measures 15 mm in short axis, slightly smaller since n. Subcarinal lymph node measures 20 mm stable. No new lymphadenopathy appreciated.  Left lower cervical lymph node measuring 15 mm in short axis , not significantly changed. A right lower cervical lymph node measures 13 mm, stable. Retrocrural lymph nodes are mildly larger since prior exam. Reference right retrocrural lymph node now measures 12 mm, previously 8 mm. There is a partially visualized concepcion conglomerate between the left kidney and pancreas measuring about 50 mm in transverse diameter.    CT C/A/P 5/3/2022: Resolution of right lower lobe nodules seen on prior CT.  No enlarging pulmonary nodule or consolidation on current exam. Reference right hilar lymph node measures 9 mm in short axis, decreased from 22 mm before.  Reference left hilar lymph node also smaller, measuring about 6 mm in short axis.  Subcarinal lymph node has also decreased in size, now measuring 7 mm in short axis compared to 25 mm before.  No new lymphadenopathy identified. Abdominal/pelvic lymph nodes: Significant improvement in  gastrohepatic, retrocrural and retroperitoneal lymphadenopathy since November.  For reference, a left periaortic lymph node now measures 9 mm in short axis, previously 20 mm.  Impression: 1. Improved lymphadenopathy since prior exams.  No new pathologically enlarged lymph nodes identified. 2. Decreased number of pulmonary nodules since 01/19/2022.  CT C/A/P 11/29/22:  Impression: Scattered lymph nodes in the periaortic region, bilateral pelvis, and inguinal regions bilaterally which are centrally stable in the interval.  No intrathoracic lymphadenopathy or pulmonary nodules. Overall no interval change from the prior.  CT C/A/P 10/3/2023:  Mildly prominent bilateral axillary and inguinal lymph nodes which have enlarged from the prior exam, however demonstrate a preserved fatty hilum.  These are indeterminate on this exam.  PET CT 1/5/2024: Blood pool max SUV 2.1. Background liver max SUV 3.3.  There are scattered hypermetabolic cervical lymph nodes.  For example on the left measures 5 mm short axis with max SUV 5.2. There are hypermetabolic bilateral axillary lymph nodes.  On the right measures 14 mm  with max SUV 5.3.  There is some vague anterior mediastinal soft tissue with max SUV 3.1. There are some moderately hypermetabolic inguinal lymph nodes.  right measures 10 mm  with max SUV 3.3.      Pathology:    CLINICAL HISTORY:       Patient: Azael Hodge is a 33 y.o. male. with multiple medical problems including HIV/AIDS d/c from the hospital 11/23/2021 and returned to the ER on 11/25/2021 with abd pain and lower back pain, also reports nausea and constipation, denies any vomiting. Pt is a poor historian and has difficulty describing what is wrong with him. Pt recently admitted for pneumonia. Pt denies any chest pain or SOB. He also presented  with fever.    CT angiogram 11/20/2021 done when patient presented with SOB on previous hospitalization showed enlarged mediastinal lymph nodes extending into the  bilateral vj. On the right largest conglomeration of lymph nodes measures approximately 1.8 x 3.3 cm. This concepcion prominence is seen throughout the mediastinum as well as enlarged lymph node in the left neck measuring 1.3 cm. No pulmonary embolism.     Upon evaluation at the ER, CT A/P 11/25/2021: There are multiple enlarged discrete lymph nodes at the juan antonio hepatis, celiac region, root of the mesentery, retrocrural space and retroperitoneum, the largest measures approximately 3.7 cm in the left paraaortic region. Consider possible lymphoma. There is trace pericholecystic fluid. No radiodense gallstone or wall edema is seen. Considerations include early acute cholecystitis and reaction to adjacent hepatitis. Ultrasound may be helpful for more definitive characterization if needed. Mild hepatomegaly. There is some concentric mural thickening of the anorectum which may represent proctitis.     Of note CT C/A/P 10/19/2021 with Findings consistent with bilateral atypical pneumonia. Lymph nodes: There are scattered reactive-sized mediastinal lymph nodes. No pathologic concepcion enlargement or necrotic adenopathy. Otherwise, no evidence of acute or suspicious focal abnormality within the chest, abdomen, or pelvis. Incidentally noted gallbladder sludge without calcified stone or biliary obstruction.     Blood work with normal WBCs, he was hyponatremic with a sodium of 122, acidotic with a CO2 of 15, lactic acid 1 and lipase 18.  He was tachycardic but normotensive.  UA with trace of leukocyte esterase, 2+ protein, 1+ glucose and 1+ bilirubin.  Covid rapid test was negative.  CD4 on 11/18/2021 was 101.  Patient has been followed by infectious disease services.     We were consulted to evaluate for possible lymphoma.  Patient is seen in rounds and he is laying in bed.  He is by himself.  He looks chronically ill.  Abdomen is distended but he reports that pain is much improved. Patient had 1 unit of blood transfusion on  11/28/2021 for a hemoglobin of 7.6.  Today hemoglobin 9.3.  Hyponatremia improved.  Today sodium 134, CO2 20.        Chief Complaint: Virtual Visit      Interval History:  Patient patient is evaluated via telemedicine today to discuss PET CT scan results. He is doing well. No complaints today. No fever, chills or sweats. No bleeding. No chest pain or shortness of breath.  I discussed with the patient in detail his PET-CT results and again the CT scan that he had in October.  Patient lymphadenopathy varies with time.  He also have history of HIV and possible history of treated syphilis as well.      CT 10/2023: no pathologically enlarged mediastinal adenopathy. prominent, but not pathologically enlarged lymph nodes in the bilateral axilla.  These have enlarged from the prior exam.  A representative lymph node in the left axilla best visualized measures 16 x 11 mm compared with 8 x 3 mm previously.  The axillary lymph nodes demonstrate a preserved fatty hilum.  There are prominent bilateral inguinal lymph nodes which appears slightly enlarged from the prior exam.  A representative right inguinal lymph node measures 2.1 x 1.2 cm compared with 9 x 9 mm previously.  These also demonstrate a preserved fatty hilum.  There is no retroperitoneal, mesenteric, or pelvic adenopathy.  There is no lytic or blastic osseous lesion.    PET 1/5/2024: scattered hypermetabolic cervical lymph nodes. left measures 5 mm with max SUV 5.2. There are hypermetabolic bilateral axillary lymph nodes.  On the right measures 14 mm  with max SUV 5.3.  There is some vague anterior mediastinal soft tissue with max SUV 3.1. There are some moderately hypermetabolic inguinal lymph nodes.  right measures 10 mm  with max SUV 3.3.      ROS:All 14 points ROS taken and as per Interval History  Review of Systems   Constitutional:  Negative for chills, fever, malaise/fatigue and weight loss.   HENT:  Negative for nosebleeds and sore throat.    Eyes: Negative.     Respiratory:  Negative for cough, hemoptysis and shortness of breath.    Cardiovascular:  Negative for chest pain, palpitations and leg swelling.   Gastrointestinal:  Negative for abdominal pain, blood in stool, constipation, diarrhea, melena, nausea and vomiting.   Genitourinary:  Negative for dysuria, frequency, hematuria and urgency.   Musculoskeletal:  Negative for back pain, falls, joint pain and myalgias.   Skin:  Negative for rash.   Neurological:  Negative for dizziness, tremors, seizures, weakness and headaches.   Endo/Heme/Allergies: Negative.    Psychiatric/Behavioral:  Negative for suicidal ideas. The patient is not nervous/anxious.    Answers submitted by the patient for this visit:  Review of Systems Questionnaire (Submitted on 1/24/2024)  appetite change : No  unexpected weight change: No  mouth sores: No  visual disturbance: No  adenopathy: No      Histories:  PMH/PSH/FH/SOCIAL/ALLERGIES AND MEDS REVIEWED AND UPDATED AS APPROPRIATE         Limited physical exam due to virtual visit   There were no vitals filed for this visit.   Physical Exam  Constitutional:       Appearance: Normal appearance.   HENT:      Head: Normocephalic and atraumatic.   Eyes:      Extraocular Movements: Extraocular movements intact.   Pulmonary:      Effort: Pulmonary effort is normal.   Musculoskeletal:      Cervical back: Neck supple.   Neurological:      Mental Status: He is alert and oriented to person, place, and time.   Psychiatric:         Mood and Affect: Mood normal.         Behavior: Behavior normal.         Judgment: Judgment normal.       ECOG SCORE    0 - Fully active-able to carry on all pre-disease performance without restriction         Laboratory:  CBC with Differential:  Lab Results   Component Value Date    WBC 5.58 01/05/2024    RBC 5.27 01/05/2024    HGB 16.0 01/05/2024    HCT 47.7 01/05/2024    MCV 90.5 01/05/2024    MCH 30.4 01/05/2024    MCHC 33.5 01/05/2024    RDW 12.7 01/05/2024      01/05/2024    MPV 9.1 01/05/2024    GRAN 2.3 02/27/2015    GRAN 52.2 02/27/2015    LYMPH 1.7 02/27/2015    LYMPH 38.9 02/27/2015    MONO 0.3 02/27/2015    MONO 7.3 02/27/2015    EOS 0.0 02/27/2015    BASO 0.02 02/27/2015    EOSINOPHIL 0.9 02/27/2015    BASOPHIL 0.5 02/27/2015        CMP:  Sodium   Date Value Ref Range Status   03/14/2014 138 136 - 145 mmol/L Final     Sodium Level   Date Value Ref Range Status   01/05/2024 136 136 - 145 mmol/L Final     Potassium   Date Value Ref Range Status   03/14/2014 4.7 3.5 - 5.1 mmol/L Final     Comment:     *Slightly Hemolyzed     Potassium Level   Date Value Ref Range Status   01/05/2024 4.9 3.5 - 5.1 mmol/L Final     Chloride   Date Value Ref Range Status   03/14/2014 103 95 - 110 mmol/L Final     CO2   Date Value Ref Range Status   03/14/2014 24 23 - 29 mmol/L Final     Carbon Dioxide   Date Value Ref Range Status   01/05/2024 28 22 - 29 mmol/L Final     Glucose   Date Value Ref Range Status   03/14/2014 91 70 - 110 mg/dL Final     BUN   Date Value Ref Range Status   03/14/2014 11 6 - 20 mg/dL Final     Blood Urea Nitrogen   Date Value Ref Range Status   01/05/2024 6.8 (L) 8.9 - 20.6 mg/dL Final     Creatinine   Date Value Ref Range Status   01/05/2024 1.40 (H) 0.73 - 1.18 mg/dL Final   03/14/2014 1.2 0.5 - 1.4 mg/dL Final     Calcium   Date Value Ref Range Status   03/14/2014 9.6 8.7 - 10.5 mg/dL Final     Calcium Level Total   Date Value Ref Range Status   01/05/2024 10.0 8.4 - 10.2 mg/dL Final     Total Protein   Date Value Ref Range Status   03/14/2014 8.4 6.0 - 8.4 g/dL Final     Albumin   Date Value Ref Range Status   03/14/2014 4.7 3.5 - 5.2 g/dL Final     Albumin Level   Date Value Ref Range Status   01/05/2024 4.6 3.5 - 5.0 g/dL Final     Total Bilirubin   Date Value Ref Range Status   03/14/2014 1.5 (H) 0.1 - 1.0 mg/dL Final     Comment:     For infants and newborns, interpretation of results should be based  on gestational age, weight and in agreement with  clinical  observations.  Premature Infant recommended reference ranges:  Up to 24 hours.............<8.0 mg/dL  Up to 48 hours............<12.0 mg/dL  3-5 days..................<15.0 mg/dL  6-29 days.................<15.0 mg/dL     Bilirubin Total   Date Value Ref Range Status   01/05/2024 0.6 <=1.5 mg/dL Final     Alkaline Phosphatase   Date Value Ref Range Status   01/05/2024 107 40 - 150 unit/L Final   03/14/2014 79 55 - 135 U/L Final     AST   Date Value Ref Range Status   03/14/2014 29 10 - 40 U/L Final     Aspartate Aminotransferase   Date Value Ref Range Status   01/05/2024 24 5 - 34 unit/L Final     ALT   Date Value Ref Range Status   03/14/2014 17 10 - 44 U/L Final     Alanine Aminotransferase   Date Value Ref Range Status   01/05/2024 19 0 - 55 unit/L Final     Anion Gap   Date Value Ref Range Status   03/14/2014 11 8 - 16 mmol/L Final     eGFR if    Date Value Ref Range Status   03/14/2014 >60.0 >60 mL/min/1.73 m^2 Final     eGFR if non    Date Value Ref Range Status   03/14/2014 >60.0 >60 mL/min/1.73 m^2 Final     Comment:     Calculation used to obtain the estimated glomerular filtration  rate (eGFR) is the CKD-EPI equation. Since race is unknown   in our information system, the eGFR values for   -American and Non--American patients are given   for each creatinine result.     Estimated GFR-Non    Date Value Ref Range Status   04/25/2022 >60               Assessment:       1. Lymphadenopathy    2. Localized enlarged lymph nodes    3. Human immunodeficiency virus (HIV) disease      1) HIV/AIDS  2) Lymphadenopathy--no palpable lymph nodes. If need biopsy need to be from a larger LN (> 1 cm)--most likely reactive, benign  3) Anemia--stable  4) Leukopenia--stable  5) H/o PJP pneumonia, Syphilis and disseminated MAC   6) Pulmonary nodule--Increase in number but very small--subcm--> Infectious??--> resolved    Discussion:   Patient with history of  HIV/AIDS presented with abdominal pain, fever, and lymphadenopathy.  Recently discharged from the hospital for PJP pneumonia and disseminated MAC.  Is currently on rifampin, ethambutol and clarithromycin.  He is also on ART therapy for HIV that was restarted on 10/11/2021.  He is also on dolutegravir since he started Rifampin.  He completed treatment course of Bactrim for his PJP on 11/8/2021.  Blood cultures negative.  He did have blood cultures positive for MAC during the 10/19/2021 hospitalization.  He is on so on prednisone that he will need to continue for at least 2 more weeks.    Patient lymphadenopathy is relatively acute as this lymphadenopathy was not as prominent back in October of this year.  Most likely related to his disease/infection but lymphoma is on the differential. Patient reluctant to undergo any type of procedure, excisional lymph node biopsy, at this time.  He would like to give time to the medications to work and reevaluate in couple of months.  Previous CT with resolution of pulmonary nodules and significant improvement of his lymphadenopathy.  Most recent CT with increase in size of axillary and inguinal LN.  Right axillary LN palpated and is about 2 cm. It was mobile and tender. Look benign on imaging.     I discussed with the patient the results.  We discussed possible biopsy versus continue to follow closely.  At this time patient is doing well voices no concerns he can not feel any enlarged lymph nodes.  The PET-CT showed that right inguinal lymph node was a little bit smaller than was compared to CT scan in October of 2023.  Also cervical lymph node comes and goes.  Because of this he wants to continue to follow closely.  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  He will be back next month.  I will repeat CTs scan and compare adenopathy.         Plan:       Again this most likely secondary to his HIV rather than to lymphoma or any other malignancy. LAD Most likely reactive,  benign.  Low-grade lymphoma still part of the differential but less likely.  We will continue to follow for now.    CT CAP in one to evaluate LAD   If LAD worsen he will need Excisional LN biopsy to r/o lymphoma +/- AIDS related, malignancy or reactive  We will continue to monitor for now  Continue ART Rx as per ID service  RTC in 1 month with MD after CT, no labs       Encouraged to call for any questions or problems.  The patient was given ample opportunity to ask questions and they were all answered to satisfaction; patient demonstrated understanding of what we discussed and is agreeable to the plan.      AYANA QUARLES MD

## 2024-02-21 ENCOUNTER — HOSPITAL ENCOUNTER (OUTPATIENT)
Dept: RADIOLOGY | Facility: HOSPITAL | Age: 34
Discharge: HOME | End: 2024-02-21
Attending: INTERNAL MEDICINE
Payer: COMMERCIAL

## 2024-02-21 DIAGNOSIS — R59.1 LYMPHADENOPATHY: ICD-10-CM

## 2024-02-21 DIAGNOSIS — R59.0 LOCALIZED ENLARGED LYMPH NODES: ICD-10-CM

## 2024-02-21 PROCEDURE — 74177 CT ABD & PELVIS W/CONTRAST: CPT | Mod: TC

## 2024-02-21 PROCEDURE — 25500020 PHARM REV CODE 255: Performed by: INTERNAL MEDICINE

## 2024-02-21 RX ADMIN — DIATRIZOATE MEGLUMINE AND DIATRIZOATE SODIUM 30 ML: 600; 100 SOLUTION ORAL; RECTAL at 01:02

## 2024-02-21 RX ADMIN — IOPAMIDOL 100 ML: 755 INJECTION, SOLUTION INTRAVENOUS at 02:02

## 2024-02-23 ENCOUNTER — OFFICE VISIT (OUTPATIENT)
Dept: INFECTIOUS DISEASES | Facility: CLINIC | Age: 34
End: 2024-02-23
Payer: COMMERCIAL

## 2024-02-23 ENCOUNTER — TELEPHONE (OUTPATIENT)
Dept: INFECTIOUS DISEASES | Facility: CLINIC | Age: 34
End: 2024-02-23

## 2024-02-23 VITALS
BODY MASS INDEX: 25.84 KG/M2 | HEIGHT: 66 IN | TEMPERATURE: 98 F | SYSTOLIC BLOOD PRESSURE: 138 MMHG | RESPIRATION RATE: 18 BRPM | WEIGHT: 160.81 LBS | HEART RATE: 80 BPM | DIASTOLIC BLOOD PRESSURE: 79 MMHG

## 2024-02-23 DIAGNOSIS — E55.9 VITAMIN D DEFICIENCY: ICD-10-CM

## 2024-02-23 DIAGNOSIS — Z86.19 HISTORY OF SYPHILIS: ICD-10-CM

## 2024-02-23 DIAGNOSIS — N28.9 RENAL INSUFFICIENCY: ICD-10-CM

## 2024-02-23 DIAGNOSIS — B20 AIDS (ACQUIRED IMMUNODEFICIENCY SYNDROME), CD4 >=500: Primary | ICD-10-CM

## 2024-02-23 LAB
ALBUMIN SERPL-MCNC: 4 G/DL (ref 3.5–5)
ALBUMIN/GLOB SERPL: 0.9 RATIO (ref 1.1–2)
ALP SERPL-CCNC: 89 UNIT/L (ref 40–150)
ALT SERPL-CCNC: 24 UNIT/L (ref 0–55)
AST SERPL-CCNC: 24 UNIT/L (ref 5–34)
BASOPHILS # BLD AUTO: 0.04 X10(3)/MCL
BASOPHILS NFR BLD AUTO: 1 %
BILIRUB SERPL-MCNC: 0.5 MG/DL
BUN SERPL-MCNC: 12.5 MG/DL (ref 8.9–20.6)
C TRACH DNA SPEC QL NAA+PROBE: NOT DETECTED
CALCIUM SERPL-MCNC: 9.2 MG/DL (ref 8.4–10.2)
CHLORIDE SERPL-SCNC: 106 MMOL/L (ref 98–107)
CO2 SERPL-SCNC: 26 MMOL/L (ref 22–29)
CREAT SERPL-MCNC: 1.39 MG/DL (ref 0.73–1.18)
DEPRECATED CALCIDIOL+CALCIFEROL SERPL-MC: 17.6 NG/ML (ref 30–80)
EOSINOPHIL # BLD AUTO: 0.19 X10(3)/MCL (ref 0–0.9)
EOSINOPHIL NFR BLD AUTO: 4.8 %
ERYTHROCYTE [DISTWIDTH] IN BLOOD BY AUTOMATED COUNT: 12.5 % (ref 11.5–17)
GFR SERPLBLD CREATININE-BSD FMLA CKD-EPI: >60 MLS/MIN/1.73/M2
GLOBULIN SER-MCNC: 4.5 GM/DL (ref 2.4–3.5)
GLUCOSE SERPL-MCNC: 86 MG/DL (ref 74–100)
HCT VFR BLD AUTO: 44.5 % (ref 42–52)
HGB BLD-MCNC: 14.8 G/DL (ref 14–18)
IMM GRANULOCYTES # BLD AUTO: 0 X10(3)/MCL (ref 0–0.04)
IMM GRANULOCYTES NFR BLD AUTO: 0 %
LYMPHOCYTES # BLD AUTO: 1.82 X10(3)/MCL (ref 0.6–4.6)
LYMPHOCYTES NFR BLD AUTO: 45.7 %
MCH RBC QN AUTO: 30.6 PG (ref 27–31)
MCHC RBC AUTO-ENTMCNC: 33.3 G/DL (ref 33–36)
MCV RBC AUTO: 91.9 FL (ref 80–94)
MONOCYTES # BLD AUTO: 0.27 X10(3)/MCL (ref 0.1–1.3)
MONOCYTES NFR BLD AUTO: 6.8 %
N GONORRHOEA DNA SPEC QL NAA+PROBE: NOT DETECTED
NEUTROPHILS # BLD AUTO: 1.66 X10(3)/MCL (ref 2.1–9.2)
NEUTROPHILS NFR BLD AUTO: 41.7 %
NRBC BLD AUTO-RTO: 0 %
PLATELET # BLD AUTO: 304 X10(3)/MCL (ref 130–400)
PMV BLD AUTO: 9.6 FL (ref 7.4–10.4)
POTASSIUM SERPL-SCNC: 4.2 MMOL/L (ref 3.5–5.1)
PROT SERPL-MCNC: 8.5 GM/DL (ref 6.4–8.3)
RBC # BLD AUTO: 4.84 X10(6)/MCL (ref 4.7–6.1)
RPR SER QL: REACTIVE
RPR SER-TITR: ABNORMAL {TITER}
SODIUM SERPL-SCNC: 138 MMOL/L (ref 136–145)
SOURCE (OHS): NORMAL
T PALLIDUM AB SER QL: REACTIVE
WBC # SPEC AUTO: 3.98 X10(3)/MCL (ref 4.5–11.5)

## 2024-02-23 PROCEDURE — 86592 SYPHILIS TEST NON-TREP QUAL: CPT | Performed by: NURSE PRACTITIONER

## 2024-02-23 PROCEDURE — 86480 TB TEST CELL IMMUN MEASURE: CPT | Performed by: NURSE PRACTITIONER

## 2024-02-23 PROCEDURE — 1159F MED LIST DOCD IN RCRD: CPT | Mod: CPTII,,, | Performed by: NURSE PRACTITIONER

## 2024-02-23 PROCEDURE — 3075F SYST BP GE 130 - 139MM HG: CPT | Mod: CPTII,,, | Performed by: NURSE PRACTITIONER

## 2024-02-23 PROCEDURE — 86780 TREPONEMA PALLIDUM: CPT | Performed by: NURSE PRACTITIONER

## 2024-02-23 PROCEDURE — 80053 COMPREHEN METABOLIC PANEL: CPT | Performed by: NURSE PRACTITIONER

## 2024-02-23 PROCEDURE — 3008F BODY MASS INDEX DOCD: CPT | Mod: CPTII,,, | Performed by: NURSE PRACTITIONER

## 2024-02-23 PROCEDURE — 36415 COLL VENOUS BLD VENIPUNCTURE: CPT | Performed by: NURSE PRACTITIONER

## 2024-02-23 PROCEDURE — 82306 VITAMIN D 25 HYDROXY: CPT | Performed by: NURSE PRACTITIONER

## 2024-02-23 PROCEDURE — 99214 OFFICE O/P EST MOD 30 MIN: CPT | Mod: PBBFAC | Performed by: NURSE PRACTITIONER

## 2024-02-23 PROCEDURE — 99214 OFFICE O/P EST MOD 30 MIN: CPT | Mod: S$PBB,,, | Performed by: NURSE PRACTITIONER

## 2024-02-23 PROCEDURE — 1160F RVW MEDS BY RX/DR IN RCRD: CPT | Mod: CPTII,,, | Performed by: NURSE PRACTITIONER

## 2024-02-23 PROCEDURE — 87491 CHLMYD TRACH DNA AMP PROBE: CPT | Performed by: NURSE PRACTITIONER

## 2024-02-23 PROCEDURE — 87536 HIV-1 QUANT&REVRSE TRNSCRPJ: CPT | Performed by: NURSE PRACTITIONER

## 2024-02-23 PROCEDURE — 85025 COMPLETE CBC W/AUTO DIFF WBC: CPT | Performed by: NURSE PRACTITIONER

## 2024-02-23 PROCEDURE — 3044F HG A1C LEVEL LT 7.0%: CPT | Mod: CPTII,,, | Performed by: NURSE PRACTITIONER

## 2024-02-23 PROCEDURE — 3078F DIAST BP <80 MM HG: CPT | Mod: CPTII,,, | Performed by: NURSE PRACTITIONER

## 2024-02-23 RX ORDER — ABACAVIR SULFATE, DOLUTEGRAVIR SODIUM, LAMIVUDINE 600; 50; 300 MG/1; MG/1; MG/1
1 TABLET, FILM COATED ORAL DAILY
Qty: 90 TABLET | Refills: 1 | Status: SHIPPED | OUTPATIENT
Start: 2024-02-23

## 2024-02-23 RX ORDER — ERGOCALCIFEROL 1.25 MG/1
50000 CAPSULE ORAL
Qty: 12 CAPSULE | Refills: 1 | Status: SHIPPED | OUTPATIENT
Start: 2024-02-23 | End: 2024-08-09

## 2024-02-23 NOTE — TELEPHONE ENCOUNTER
----- Message from KASIA Staley sent at 2/23/2024  1:11 PM CST -----  Reviewed labs.  Vit D 17.6 down from 27.1 and creatinine is 1.39.  Please make sure patient is taking Ergocalciferol 50,000 units once weekly. I recommend the following for elevated creatinine:  Keep hydrated.  Avoid NSAIDs (ibuprofen, naproxen, aleve, advil, toradol or mobic)  Keep BP (goal <130/80) and cholesterol (LDL <100) within recommended goals  Low sodium diet encouraged; 2 grams per day  Increase exercise activity; 30 minutes 3-5 x per week   Maintain a healthy weight  Smoking cessation encouraged   Decrease ETOH intake/encouraged cessation   Medication compliance stressed   Keep all follow up appointments as scheduled

## 2024-02-23 NOTE — PROGRESS NOTES
Patient ID: Azael Hodge 33 y.o.     Chief Complaint:   Chief Complaint   Patient presents with    Follow-up     B20        HPI:    Azael is a 32 y/o AAM here for AIDS f/u. MSM. Dx 3/7/16. HLA  negative. 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 Pt was treated for disseminated MAC from 10/2021-11/2022.  He reports adherence to Triumeq, tolerating the meds well.  Denies fever, headache, chills, visual problems, N/V/D, SOB, cough, chest pain or abd pain. Hx of syphilis. Treated with Bicillin LA 2.4 million units x 1 3/1/19.  RPR titer at that time was 1:128. Treated again 3/30/23 with Bicillin LA 2.4 million units x 1. RPR remained elevated at 1:64 on 9/27/23.  Pt was retreated 9/2023 with Doxycycline 100 mg 1 po BID x 28 days. Pt states he completed the meds.  RPR will need to be updated. He reports adherence to Ergocalciferol 50,000 units once weekly. Reviewed labs from 9/27/23 HIV VL <20, CD4 508, Vit D 27.1.  1/5/24 creatinine 1.40.  Pt is followed by Dr. Cage for lymphadenopathy.  He was last seen 1/26/24 and has a follow up appt 2/26/24. Repeat CT chest and abd with contrast was done 2/21/23. He attended eye appt 9/22/23. PCP is Brittany Snider. Pt works offshore.           Past Medical History:   Diagnosis Date    ADHD (attention deficit hyperactivity disorder)     Ages 5 through 13.  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 No pertinent surgical history.     Social History     Socioeconomic History    Marital status: Single   Occupational History     Employer: [employer]   Tobacco Use    Smoking status: Never    Smokeless tobacco: Never   Substance and Sexual Activity    Alcohol use: No    Drug use: Never    Sexual activity: Not Currently     Partners: Male     Birth control/protection: Abstinence     Social Determinants of Health     Financial Resource Strain: Low Risk  (1/9/2024)    Overall Financial Resource Strain  (CARDIA)     Difficulty of Paying Living Expenses: Not hard at all   Food Insecurity: No Food Insecurity (1/9/2024)    Hunger Vital Sign     Worried About Running Out of Food in the Last Year: Never true     Ran Out of Food in the Last Year: Never true   Transportation Needs: No Transportation Needs (1/9/2024)    PRAPARE - Transportation     Lack of Transportation (Medical): No     Lack of Transportation (Non-Medical): No   Physical Activity: Insufficiently Active (1/9/2024)    Exercise Vital Sign     Days of Exercise per Week: 3 days     Minutes of Exercise per Session: 30 min   Stress: No Stress Concern Present (1/9/2024)    Citizen of [place] of Occupational Health - Occupational Stress Questionnaire     Feeling of Stress : Not at all   Social Connections: Socially Isolated (1/9/2024)    Social Connection and Isolation Panel [NHANES]     Frequency of Communication with Friends and Family: Never     Frequency of Social Gatherings with Friends and Family: More than three times a week     Attends Anabaptist Services: Never     Active Member of Clubs or Organizations: No     Attends Club or Organization Meetings: Never     Marital Status: Never    Housing Stability: Low Risk  (1/9/2024)    Housing Stability Vital Sign     Unable to Pay for Housing in the Last Year: No     Number of Places Lived in the Last Year: 1     Unstable Housing in the Last Year: No        Family History   Problem Relation Age of Onset    Hypertension Mother     Migraines Father     Hypertension Father     Anxiety disorder Father     Diabetes Brother     Hypertension Brother         Review of patient's allergies indicates:   Allergen Reactions    Banana      Other reaction(s): Pharyngeal edema    Benzodiazepines Hallucinations        Immunization History   Administered Date(s) Administered    DTP 1989, 1989, 01/29/1990, 10/18/1990, 07/29/1993    HIB 10/18/1990    HPV 9-Valent 06/29/2023, 09/27/2023, 01/04/2024    Hepatitis A,  "Adult 05/26/2016    Hepatitis B 01/02/2001, 02/08/2001, 07/09/2001    Hepatitis B, Adult 03/28/2016    Hepatitis B, Pediatric/Adolescent 04/27/1995, 06/02/1995, 11/10/1995    Influenza - Quadrivalent 10/19/2016    Influenza - Quadrivalent - PF (6-35 months) 11/13/2017, 03/01/2019    Influenza - Quadrivalent - PF *Preferred* (6 months and older) 11/04/2019    Influenza - Trivalent - PF (ADULT) 11/13/2017    MMR 10/18/1990, 07/29/1993    Meningococcal Conjugate (MCV4P) 04/06/2011, 03/01/2019, 11/04/2019    OPV 1989, 1989, 10/18/1990, 07/29/1993    Pneumococcal Conjugate - 13 Valent 05/26/2016    Pneumococcal Polysaccharide - 23 Valent 08/02/2016, 10/26/2022    Td (ADULT) 01/02/2001    Tdap 08/01/2007, 08/02/2016        Review of Systems   Constitutional: Negative.    HENT: Negative.     Eyes: Negative.    Respiratory: Negative.     Cardiovascular: Negative.    Gastrointestinal: Negative.    Genitourinary: Negative.    Musculoskeletal: Negative.    Skin: Negative.    Neurological: Negative.    Endo/Heme/Allergies: Negative.    Psychiatric/Behavioral: Negative.     All other systems reviewed and are negative.         Objective:      /79   Pulse 80   Temp 98.2 °F (36.8 °C)   Resp 18   Ht 5' 6" (1.676 m)   Wt 72.9 kg (160 lb 12.8 oz)   BMI 25.95 kg/m²      Physical Exam  Vitals reviewed.   Constitutional:       General: He is not in acute distress.     Appearance: Normal appearance.   HENT:      Head: Normocephalic.      Right Ear: External ear normal.      Left Ear: External ear normal.      Nose: Nose normal.      Mouth/Throat:      Mouth: Mucous membranes are moist.      Pharynx: Oropharynx is clear.   Eyes:      General: No scleral icterus.     Extraocular Movements: Extraocular movements intact.      Conjunctiva/sclera: Conjunctivae normal.      Pupils: Pupils are equal, round, and reactive to light.   Cardiovascular:      Rate and Rhythm: Normal rate and regular rhythm.      Pulses: Normal " pulses.      Heart sounds: Normal heart sounds.   Pulmonary:      Effort: Pulmonary effort is normal. No respiratory distress.      Breath sounds: Normal breath sounds.   Abdominal:      General: Bowel sounds are normal. There is no distension.      Palpations: Abdomen is soft. There is no mass.      Tenderness: There is no abdominal tenderness. There is no right CVA tenderness or left CVA tenderness.      Hernia: No hernia is present.   Musculoskeletal:         General: No tenderness or signs of injury. Normal range of motion.      Cervical back: Normal range of motion and neck supple.      Right lower leg: No edema.      Left lower leg: No edema.   Lymphadenopathy:      Cervical: No cervical adenopathy.   Skin:     General: Skin is warm and dry.      Capillary Refill: Capillary refill takes less than 2 seconds.      Findings: No erythema or lesion.   Neurological:      General: No focal deficit present.      Mental Status: He is alert and oriented to person, place, and time. Mental status is at baseline.   Psychiatric:         Mood and Affect: Mood normal.         Behavior: Behavior normal.         Thought Content: Thought content normal.         Judgment: Judgment normal.          Labs:   Lab Results   Component Value Date    WBC 5.58 01/05/2024    HGB 16.0 01/05/2024    HCT 47.7 01/05/2024    MCV 90.5 01/05/2024     01/05/2024       CMP  Sodium   Date Value Ref Range Status   03/14/2014 138 136 - 145 mmol/L Final     Sodium Level   Date Value Ref Range Status   01/05/2024 136 136 - 145 mmol/L Final     Potassium   Date Value Ref Range Status   03/14/2014 4.7 3.5 - 5.1 mmol/L Final     Comment:     *Slightly Hemolyzed     Potassium Level   Date Value Ref Range Status   01/05/2024 4.9 3.5 - 5.1 mmol/L Final     Chloride   Date Value Ref Range Status   03/14/2014 103 95 - 110 mmol/L Final     CO2   Date Value Ref Range Status   03/14/2014 24 23 - 29 mmol/L Final     Carbon Dioxide   Date Value Ref Range  Status   01/05/2024 28 22 - 29 mmol/L Final     Glucose   Date Value Ref Range Status   03/14/2014 91 70 - 110 mg/dL Final     BUN   Date Value Ref Range Status   03/14/2014 11 6 - 20 mg/dL Final     Blood Urea Nitrogen   Date Value Ref Range Status   01/05/2024 6.8 (L) 8.9 - 20.6 mg/dL Final     Creatinine   Date Value Ref Range Status   01/05/2024 1.40 (H) 0.73 - 1.18 mg/dL Final   03/14/2014 1.2 0.5 - 1.4 mg/dL Final     Calcium   Date Value Ref Range Status   03/14/2014 9.6 8.7 - 10.5 mg/dL Final     Calcium Level Total   Date Value Ref Range Status   01/05/2024 10.0 8.4 - 10.2 mg/dL Final     Total Protein   Date Value Ref Range Status   03/14/2014 8.4 6.0 - 8.4 g/dL Final     Albumin   Date Value Ref Range Status   03/14/2014 4.7 3.5 - 5.2 g/dL Final     Albumin Level   Date Value Ref Range Status   01/05/2024 4.6 3.5 - 5.0 g/dL Final     Total Bilirubin   Date Value Ref Range Status   03/14/2014 1.5 (H) 0.1 - 1.0 mg/dL Final     Comment:     For infants and newborns, interpretation of results should be based  on gestational age, weight and in agreement with clinical  observations.  Premature Infant recommended reference ranges:  Up to 24 hours.............<8.0 mg/dL  Up to 48 hours............<12.0 mg/dL  3-5 days..................<15.0 mg/dL  6-29 days.................<15.0 mg/dL     Bilirubin Total   Date Value Ref Range Status   01/05/2024 0.6 <=1.5 mg/dL Final     Alkaline Phosphatase   Date Value Ref Range Status   01/05/2024 107 40 - 150 unit/L Final   03/14/2014 79 55 - 135 U/L Final     AST   Date Value Ref Range Status   03/14/2014 29 10 - 40 U/L Final     Aspartate Aminotransferase   Date Value Ref Range Status   01/05/2024 24 5 - 34 unit/L Final     ALT   Date Value Ref Range Status   03/14/2014 17 10 - 44 U/L Final     Alanine Aminotransferase   Date Value Ref Range Status   01/05/2024 19 0 - 55 unit/L Final     Anion Gap   Date Value Ref Range Status   03/14/2014 11 8 - 16 mmol/L Final     eGFR    Date Value Ref Range Status   01/05/2024 >60 mls/min/1.73/m2 Final     Lab Results   Component Value Date    TSH 2.612 01/09/2024     Hep C Ab Interp   Date Value Ref Range Status   12/21/2022 Nonreactive Nonreactive Final     Syphilis Antibody   Date Value Ref Range Status   09/27/2023 Reactive (A) Nonreactive, Equivocal Final     RPR   Date Value Ref Range Status   09/27/2023 Reactive (A) Non-Reactive Final     RPR Titer   Date Value Ref Range Status   09/27/2023 64 dils (A) (none) Final     Cholesterol Total   Date Value Ref Range Status   01/09/2024 165 <=200 mg/dL Final     Cholesterol   Date Value Ref Range Status   03/14/2014 118 (L) 120 - 199 mg/dL Final     Comment:     The National Cholesterol Education Program (NCEP) has set the  following guidelines (reference ranges) for Cholesterol:  Optimal.....................<200 mg/dL  Borderline High.............200-239 mg/dL  High........................> or = 240 mg/dL     HDL   Date Value Ref Range Status   03/14/2014 63 40 - 75 mg/dL Final     Comment:     The National Cholesterol Education Program (NCEP) has set the  following guidelines (reference values) for HDL Cholesterol:  Low...............<40 mg/dL  Optimal...........>60 mg/dL     HDL Cholesterol   Date Value Ref Range Status   01/09/2024 44 35 - 60 mg/dL Final     Triglycerides   Date Value Ref Range Status   03/14/2014 36 30 - 150 mg/dL Final     Comment:     The National Cholesterol Education Program (NCEP) has set the  following guidelines (reference values) for triglycerides:  Normal......................<150 mg/dL  Borderline High.............150-199 mg/dL  High........................200-499 mg/dL     Triglyceride   Date Value Ref Range Status   01/09/2024 89 34 - 140 mg/dL Final     Total Cholesterol/HDL Ratio   Date Value Ref Range Status   03/14/2014 1.9 (L) 2.0 - 5.0 Final     Cholesterol/HDL Ratio   Date Value Ref Range Status   01/09/2024 4 0 - 5 Final     Very Low Density Lipoprotein    Date Value Ref Range Status   01/09/2024 18  Final     LDL Cholesterol   Date Value Ref Range Status   01/09/2024 103.00 50.00 - 140.00 mg/dL Final     Vit D 25 OH   Date Value Ref Range Status   09/27/2023 27.1 (L) 30.0 - 80.0 ng/mL Final     Results for orders placed or performed in visit on 09/27/23   CD4 Lymphocytes   Result Value Ref Range    Patient Age 33     WBC Absolute 3,910 (L) 4,500 - 11,500 /mm3    Lymph Percent 57.5 (H) 28 - 48 %    Lymph Absolute 2,248.25 1,260 - 5,520 x10(3)/mcL    CD4 % 22.6 %    CD4 Absolute 508 (L) 589 - 1,505 unit/L    T Cell Interp       Normal absolute lymphocyte count with decreased absolute CD4+ lymphocyte count.    Ken Gifford M.D.     Narrative    This test was developed and its performance characteristics determined by [facility]. It has not been cleared or approved by the US Food and Drug Administration. The FDA does not require this test to go through premarket FDA review. This test is used for clinical purposes. It should not be regarded as investigational or for research. This laboratory is certified under the Clinical Laboratory Improvement Amendments (CLIA) as qualified to perform high complexity clinical laboratory testing.     Results for orders placed or performed in visit on 09/27/23   HIV-1 RNA, Quantitative, PCR with Reflex to Genotype   Result Value Ref Range    HIV-1 RNA Detect/Quant, P <20 (A) Undetected copies/mL     Results for orders placed or performed in visit on 12/21/22   Quantiferon Gold TB   Result Value Ref Range    QuantiFERON-Tb Gold Plus Result Negative Negative    TB1 Ag minus Nil Result 0.01 IU/mL    TB2 Ag minus Nil Result 0.01 IU/mL    Mitogen minus Nil Result 9.90 IU/mL    Nil Result 0.10 IU/mL     Results for orders placed or performed in visit on 03/28/23   C.trach/N.gonor AMP RNA   Result Value Ref Range    Chlamydia trachomatis amplified RNA Negative Negative    Neisseria gonorrhoeae amplified RNA  Negative Negative    Source throat     SOURCE: throat      Results for orders placed or performed in visit on 12/21/22   Urinalysis   Result Value Ref Range    Color, UA Yellow Yellow, Light-Yellow, Dark Yellow, Nichol, Straw    Appearance, UA Clear Clear    Specific Gravity, UA 1.032     pH, UA 5.5 5.0 - 8.5    Protein, UA Trace (A) Negative mg/dL    Glucose, UA Normal Negative, Normal mg/dL    Ketones, UA Negative Negative mg/dL    Blood, UA Negative Negative unit/L    Bilirubin, UA Negative Negative mg/dL    Urobilinogen, UA 1+ (A) 0.2, 1.0, Normal mg/dL    Nitrites, UA Negative Negative    Leukocyte Esterase, UA Negative Negative unit/L    WBC, UA 0-5 None Seen, 0-2, 3-5, 0-5 /HPF    Bacteria, UA None Seen None Seen /HPF    Squamous Epithelial Cells, UA Trace (A) None Seen /HPF    Mucous, UA Few (A) None Seen /LPF    Hyaline Casts, UA None Seen None Seen /lpf    RBC, UA 0-5 None Seen, 0-2, 3-5, 0-5 /HPF       Imaging: Reviewed most recent relevant imaging studies available, notable results highlighted in this note    Medications:     Current Outpatient Medications   Medication Instructions    ergocalciferol (VITAMIN D2) 50,000 Units, Oral, Every 7 days    TRIUMEQ 600- mg Tab 1 tablet, Oral, Daily       Assessment:       Problem List Items Addressed This Visit    None  Visit Diagnoses       AIDS (acquired immunodeficiency syndrome), CD4 >=500    -  Primary    Relevant Medications    TRIUMEQ 600- mg Tab    Other Relevant Orders    HIV-1 RNA, Quantitative, PCR with Reflex to Genotype    CD4 Lymphocytes    CBC Auto Differential    Comprehensive Metabolic Panel    Quantiferon Gold TB    Chlamydia/GC, PCR    Vitamin D deficiency        Relevant Medications    ergocalciferol (VITAMIN D2) 50,000 unit Cap    Other Relevant Orders    Vitamin D    Renal insufficiency        Relevant Orders    Ambulatory referral/consult to Nephrology    History of syphilis        Relevant Orders    SYPHILIS ANTIBODY (WITH REFLEX  RPR)               Plan:      AIDS (acquired immunodeficiency syndrome), CD4 >=500  -     TRIUMEQ 600- mg Tab; Take 1 tablet by mouth once daily.  Dispense: 90 tablet; Refill: 1  -     HIV-1 RNA, Quantitative, PCR with Reflex to Genotype; Future; Expected date: 02/23/2024  -     CD4 Lymphocytes; Future; Expected date: 02/23/2024  -     CBC Auto Differential; Future; Expected date: 02/23/2024  -     Comprehensive Metabolic Panel; Future; Expected date: 02/23/2024  -     Quantiferon Gold TB; Future; Expected date: 02/23/2024  -     Chlamydia/GC, PCR; Future; Expected date: 02/23/2024  Adherence and sexual health counseling done.  Use condoms for all sexual encounters.  Blood precautions.   Continue Triumeq 1 po q day as prescribed.  Labs today.  RTC 4 months with Franca     HIV Wellness:  Oral CT/GC: 6/29/23  Anal CT/GC: 6/29/23  Urine CT/GC: ordered   RPR: ordered   Ophth: 9/22/23    Vitamin D deficiency  -     ergocalciferol (VITAMIN D2) 50,000 unit Cap; Take 1 capsule (50,000 Units total) by mouth every 7 days.  Dispense: 12 capsule; Refill: 1  -     Vitamin D; Future; Expected date: 02/23/2024  Continue meds. Pt educated on the importance of Vit D to bone and organ health.     Renal insufficiency  -     Ambulatory referral/consult to Nephrology; Future; Expected date: 03/01/2024  Referred to renal clinic   CT chest/abd with contrast 10/2023 normal kidneys  Keep hydrated.  Avoid NSAIDs (ibuprofen, naproxen, aleve, advil, toradol or mobic)  Keep BP (goal <130/80) and cholesterol (LDL <100) within recommended goals  Low sodium diet encouraged; 2 grams per day  Increase exercise activity; 30 minutes 3-5 x per week   Maintain a healthy weight  Smoking cessation encouraged   Decrease ETOH intake/encouraged cessation   Medication compliance stressed   Keep all follow up appointments as scheduled     History of syphilis  -     SYPHILIS ANTIBODY (WITH REFLEX RPR); Future; Expected date: 02/23/2024  Syphilis  hx:  Baseline RPR 1:128 3/1/19  Treated with Bicillin LA 2.4 million units x 1 3/1/19  RPR 1:128 3/29/23  Treated with Bicillin LA 2.4 million units x 1 3/30/23  Repeat RPR 1:128 6/29/23. Pt did not return for treatment.  Retreated with Doxycycline 100 mg 1 po BID X 28 days due to a nationwide shortage of Bicillin 10/2023       31 minutes of total time spent on the encounter, which includes face to face time and non-face to face time preparing to see the patient (eg, review of tests), Obtaining and/or reviewing separately obtained history, Documenting clinical information in the electronic or other health record, Independently interpreting results (not separately reported) and communicating results to the patient/family/caregiver, or Care coordination (not separately reported).

## 2024-02-23 NOTE — TELEPHONE ENCOUNTER
Patient informed of results ( he did miss a few doses) and providers recommendations. All questions answered. Patient verbalized understanding.

## 2024-02-23 NOTE — PROGRESS NOTES
Reviewed labs.  Vit D 17.6 down from 27.1 and creatinine is 1.39.  Please make sure patient is taking Ergocalciferol 50,000 units once weekly. I recommend the following for elevated creatinine:  Keep hydrated.  Avoid NSAIDs (ibuprofen, naproxen, aleve, advil, toradol or mobic)  Keep BP (goal <130/80) and cholesterol (LDL <100) within recommended goals  Low sodium diet encouraged; 2 grams per day  Increase exercise activity; 30 minutes 3-5 x per week   Maintain a healthy weight  Smoking cessation encouraged   Decrease ETOH intake/encouraged cessation   Medication compliance stressed   Keep all follow up appointments as scheduled

## 2024-02-23 NOTE — PROGRESS NOTES
Reviewed labs. RPR 1:128. This has increased from 1:64. Pt will need to be treated with Bicillin LA 2.4 million units x 1. Partner will need to be treated as well.  Please contact patient with results and treatment plan.

## 2024-02-26 ENCOUNTER — CLINICAL SUPPORT (OUTPATIENT)
Dept: INFECTIOUS DISEASES | Facility: CLINIC | Age: 34
End: 2024-02-26
Payer: COMMERCIAL

## 2024-02-26 ENCOUNTER — TELEPHONE (OUTPATIENT)
Dept: INFECTIOUS DISEASES | Facility: CLINIC | Age: 34
End: 2024-02-26

## 2024-02-26 ENCOUNTER — OFFICE VISIT (OUTPATIENT)
Dept: HEMATOLOGY/ONCOLOGY | Facility: CLINIC | Age: 34
End: 2024-02-26
Payer: COMMERCIAL

## 2024-02-26 VITALS
BODY MASS INDEX: 25.9 KG/M2 | SYSTOLIC BLOOD PRESSURE: 144 MMHG | WEIGHT: 161.19 LBS | TEMPERATURE: 99 F | DIASTOLIC BLOOD PRESSURE: 73 MMHG | HEART RATE: 63 BPM | OXYGEN SATURATION: 99 % | RESPIRATION RATE: 18 BRPM | HEIGHT: 66 IN

## 2024-02-26 DIAGNOSIS — Z86.19 HISTORY OF SYPHILIS: Primary | ICD-10-CM

## 2024-02-26 DIAGNOSIS — B20 HUMAN IMMUNODEFICIENCY VIRUS (HIV) DISEASE: ICD-10-CM

## 2024-02-26 DIAGNOSIS — R59.1 LYMPHADENOPATHY: Primary | ICD-10-CM

## 2024-02-26 LAB — HIV1 RNA # PLAS NAA DL=20: <20 COPIES/ML

## 2024-02-26 PROCEDURE — 3078F DIAST BP <80 MM HG: CPT | Mod: CPTII,S$GLB,, | Performed by: NURSE PRACTITIONER

## 2024-02-26 PROCEDURE — 1159F MED LIST DOCD IN RCRD: CPT | Mod: CPTII,S$GLB,, | Performed by: NURSE PRACTITIONER

## 2024-02-26 PROCEDURE — 96372 THER/PROPH/DIAG INJ SC/IM: CPT | Mod: PBBFAC

## 2024-02-26 PROCEDURE — 99211 OFF/OP EST MAY X REQ PHY/QHP: CPT | Mod: PBBFAC,25

## 2024-02-26 PROCEDURE — 99214 OFFICE O/P EST MOD 30 MIN: CPT | Mod: S$GLB,,, | Performed by: NURSE PRACTITIONER

## 2024-02-26 PROCEDURE — 3044F HG A1C LEVEL LT 7.0%: CPT | Mod: CPTII,S$GLB,, | Performed by: NURSE PRACTITIONER

## 2024-02-26 PROCEDURE — 1160F RVW MEDS BY RX/DR IN RCRD: CPT | Mod: CPTII,S$GLB,, | Performed by: NURSE PRACTITIONER

## 2024-02-26 PROCEDURE — 3077F SYST BP >= 140 MM HG: CPT | Mod: CPTII,S$GLB,, | Performed by: NURSE PRACTITIONER

## 2024-02-26 PROCEDURE — 99999 PR PBB SHADOW E&M-EST. PATIENT-LVL IV: CPT | Mod: PBBFAC,,, | Performed by: NURSE PRACTITIONER

## 2024-02-26 PROCEDURE — 3008F BODY MASS INDEX DOCD: CPT | Mod: CPTII,S$GLB,, | Performed by: NURSE PRACTITIONER

## 2024-02-26 RX ADMIN — PENICILLIN G BENZATHINE 2.4 MILLION UNITS: 1200000 INJECTION, SUSPENSION INTRAMUSCULAR at 11:02

## 2024-02-26 NOTE — TELEPHONE ENCOUNTER
----- Message from KASIA Staley sent at 2/23/2024  5:30 PM CST -----  Reviewed labs. RPR 1:128. This has increased from 1:64. Pt will need to be treated with Bicillin LA 2.4 million units x 1. Partner will need to be treated as well.  Please contact patient with results and treatment plan.

## 2024-02-26 NOTE — PROGRESS NOTES
HEMATOLOGY/ONCOLOGY OFFICE CLINIC VISIT        Visit Information:    Initial Evaluation: 11/30/2021  Referring Provider: [facility]  Other providers:  Code status:     Diagnosis:  Lymphadenopathy  HIV +     Present treatment: N/A     Treatment/Oncology history: N/A     Plan: surveillance      Imaging:  CT angiogram 11/20/2021:No evidence of pulmonary thromboembolism. Enlarged mediastinal lymph nodes extending into the bilateral vj. On the right largest conglomeration of lymph nodes measures approximately 1.8 x 3.3 cm. This concepcion prominence is seen throughout the mediastinum as well as enlarged lymph node in the left neck measuring 1.3 cm. Pulmonary findings have resolved in the interval with mediastinal adenopathy now present. Findings are most likely reactive from recent infectious process, however recommend continued short interval follow-up to resolution to exclude lymphoma.   CT Thorax 1/19/2022: Clustered solid nodules within the posterior portion of the right lower lobe. Individual nodules measure up to 6 mm. Additional scattered small solid nodules within the remaining lobes, increased in number since prior study.  Right hilar lymph node measures 15 mm in short axis, stable. A left hilar lymph node also measures 15 mm in short axis, slightly smaller since n. Subcarinal lymph node measures 20 mm stable. No new lymphadenopathy appreciated.  Left lower cervical lymph node measuring 15 mm in short axis , not significantly changed. A right lower cervical lymph node measures 13 mm, stable. Retrocrural lymph nodes are mildly larger since prior exam. Reference right retrocrural lymph node now measures 12 mm, previously 8 mm. There is a partially visualized concepcion conglomerate between the left kidney and pancreas measuring about 50 mm in transverse diameter.    CT C/A/P 5/3/2022: Resolution of right lower lobe nodules seen on prior CT.  No enlarging pulmonary nodule or consolidation on current exam. Reference right  hilar lymph node measures 9 mm in short axis, decreased from 22 mm before.  Reference left hilar lymph node also smaller, measuring about 6 mm in short axis.  Subcarinal lymph node has also decreased in size, now measuring 7 mm in short axis compared to 25 mm before.  No new lymphadenopathy identified. Abdominal/pelvic lymph nodes: Significant improvement in gastrohepatic, retrocrural and retroperitoneal lymphadenopathy since November.  For reference, a left periaortic lymph node now measures 9 mm in short axis, previously 20 mm.  Impression: 1. Improved lymphadenopathy since prior exams.  No new pathologically enlarged lymph nodes identified. 2. Decreased number of pulmonary nodules since 01/19/2022.  CT C/A/P 11/29/22:  Impression: Scattered lymph nodes in the periaortic region, bilateral pelvis, and inguinal regions bilaterally which are centrally stable in the interval.  No intrathoracic lymphadenopathy or pulmonary nodules. Overall no interval change from the prior.  CT C/A/P 10/3/2023:  Mildly prominent bilateral axillary and inguinal lymph nodes which have enlarged from the prior exam, however demonstrate a preserved fatty hilum.  These are indeterminate on this exam.  PET CT 1/5/2024: Blood pool max SUV 2.1. Background liver max SUV 3.3.  There are scattered hypermetabolic cervical lymph nodes.  For example on the left measures 5 mm short axis with max SUV 5.2. There are hypermetabolic bilateral axillary lymph nodes.  On the right measures 14 mm  with max SUV 5.3.  There is some vague anterior mediastinal soft tissue with max SUV 3.1. There are some moderately hypermetabolic inguinal lymph nodes.  right measures 10 mm  with max SUV 3.3.      Pathology:    CLINICAL HISTORY:       Patient: Azael Hodge is a 33 y.o. male. with multiple medical problems including HIV/AIDS d/c from the hospital 11/23/2021 and returned to the ER on 11/25/2021 with abd pain and lower back pain, also reports nausea and  constipation, denies any vomiting. Pt is a poor historian and has difficulty describing what is wrong with him. Pt recently admitted for pneumonia. Pt denies any chest pain or SOB. He also presented  with fever.    CT angiogram 11/20/2021 done when patient presented with SOB on previous hospitalization showed enlarged mediastinal lymph nodes extending into the bilateral vj. On the right largest conglomeration of lymph nodes measures approximately 1.8 x 3.3 cm. This concepcion prominence is seen throughout the mediastinum as well as enlarged lymph node in the left neck measuring 1.3 cm. No pulmonary embolism.     Upon evaluation at the ER, CT A/P 11/25/2021: There are multiple enlarged discrete lymph nodes at the juan antonio hepatis, celiac region, root of the mesentery, retrocrural space and retroperitoneum, the largest measures approximately 3.7 cm in the left paraaortic region. Consider possible lymphoma. There is trace pericholecystic fluid. No radiodense gallstone or wall edema is seen. Considerations include early acute cholecystitis and reaction to adjacent hepatitis. Ultrasound may be helpful for more definitive characterization if needed. Mild hepatomegaly. There is some concentric mural thickening of the anorectum which may represent proctitis.     Of note CT C/A/P 10/19/2021 with Findings consistent with bilateral atypical pneumonia. Lymph nodes: There are scattered reactive-sized mediastinal lymph nodes. No pathologic concepcion enlargement or necrotic adenopathy. Otherwise, no evidence of acute or suspicious focal abnormality within the chest, abdomen, or pelvis. Incidentally noted gallbladder sludge without calcified stone or biliary obstruction.     Blood work with normal WBCs, he was hyponatremic with a sodium of 122, acidotic with a CO2 of 15, lactic acid 1 and lipase 18.  He was tachycardic but normotensive.  UA with trace of leukocyte esterase, 2+ protein, 1+ glucose and 1+ bilirubin.  Covid rapid test was  negative.  CD4 on 11/18/2021 was 101.  Patient has been followed by infectious disease services.     We were consulted to evaluate for possible lymphoma.  Patient is seen in rounds and he is laying in bed.  He is by himself.  He looks chronically ill.  Abdomen is distended but he reports that pain is much improved. Patient had 1 unit of blood transfusion on 11/28/2021 for a hemoglobin of 7.6.  Today hemoglobin 9.3.  Hyponatremia improved.  Today sodium 134, CO2 20.        Chief Complaint: 4 Week Follow Up (Results.)      Interval History:  Patient patient is evaluated via telemedicine today to discuss PET CT scan results. He is doing well. No complaints today. No fever, chills or sweats. No bleeding. No chest pain or shortness of breath.  I discussed with the patient in detail his PET-CT results and again the CT scan that he had in October.  Patient lymphadenopathy varies with time.  He also have history of HIV and possible history of treated syphilis as well.      CT 10/2023: no pathologically enlarged mediastinal adenopathy. prominent, but not pathologically enlarged lymph nodes in the bilateral axilla.  These have enlarged from the prior exam.  A representative lymph node in the left axilla best visualized measures 16 x 11 mm compared with 8 x 3 mm previously.  The axillary lymph nodes demonstrate a preserved fatty hilum.  There are prominent bilateral inguinal lymph nodes which appears slightly enlarged from the prior exam.  A representative right inguinal lymph node measures 2.1 x 1.2 cm compared with 9 x 9 mm previously.  These also demonstrate a preserved fatty hilum.  There is no retroperitoneal, mesenteric, or pelvic adenopathy.  There is no lytic or blastic osseous lesion.    PET 1/5/2024: scattered hypermetabolic cervical lymph nodes. left measures 5 mm with max SUV 5.2. There are hypermetabolic bilateral axillary lymph nodes.  On the right measures 14 mm  with max SUV 5.3.  There is some vague anterior  "mediastinal soft tissue with max SUV 3.1. There are some moderately hypermetabolic inguinal lymph nodes.  right measures 10 mm  with max SUV 3.3.    2/26/24: Patient presents today for 4 week f/u for CT scan results. He had CT C/A/P on 2/21/24: Impression:     Stable borderline prominent right inguinal and axillary lymph nodes. The right inquinal LN  measures 1.2 cm.       ROS:All 14 points ROS taken and as per Interval History  Review of Systems   Constitutional:  Negative for chills, fever, malaise/fatigue and weight loss.   HENT:  Negative for nosebleeds and sore throat.    Eyes: Negative.    Respiratory:  Negative for cough, hemoptysis and shortness of breath.    Cardiovascular:  Negative for chest pain, palpitations and leg swelling.   Gastrointestinal:  Negative for abdominal pain, blood in stool, constipation, diarrhea, melena, nausea and vomiting.   Genitourinary:  Negative for dysuria, frequency, hematuria and urgency.   Musculoskeletal:  Negative for back pain, falls, joint pain and myalgias.   Skin:  Negative for rash.   Neurological:  Negative for dizziness, tremors, seizures, weakness and headaches.   Endo/Heme/Allergies: Negative.    Psychiatric/Behavioral:  Negative for suicidal ideas. The patient is not nervous/anxious.      Histories:  PMH/PSH/FH/SOCIAL/ALLERGIES AND MEDS REVIEWED AND UPDATED AS APPROPRIATE           Vitals:    02/26/24 0923   BP: (!) 144/73   BP Location: Left arm   Patient Position: Sitting   Pulse: 63   Resp: 18   Temp: 98.8 °F (37.1 °C)   TempSrc: Oral   SpO2: 99%   Weight: 73.1 kg (161 lb 3.2 oz)   Height: 5' 6" (1.676 m)      Physical Exam  Constitutional:       Appearance: Normal appearance.   HENT:      Head: Normocephalic and atraumatic.   Eyes:      Extraocular Movements: Extraocular movements intact.   Pulmonary:      Effort: Pulmonary effort is normal.   Musculoskeletal:      Cervical back: Neck supple.   Lymphadenopathy:      Upper Body:      Right upper body: Axillary " adenopathy present.      Left upper body: Axillary adenopathy present.      Lower Body: Right inguinal adenopathy present.   Neurological:      Mental Status: He is alert and oriented to person, place, and time.   Psychiatric:         Mood and Affect: Mood normal.         Behavior: Behavior normal.         Judgment: Judgment normal.       ECOG SCORE    0 - Fully active-able to carry on all pre-disease performance without restriction         Laboratory:  CBC with Differential:  Lab Results   Component Value Date    WBC 3.98 (L) 02/23/2024    RBC 4.84 02/23/2024    HGB 14.8 02/23/2024    HCT 44.5 02/23/2024    MCV 91.9 02/23/2024    MCH 30.6 02/23/2024    MCHC 33.3 02/23/2024    RDW 12.5 02/23/2024     02/23/2024    MPV 9.6 02/23/2024    GRAN 2.3 02/27/2015    GRAN 52.2 02/27/2015    LYMPH 1.7 02/27/2015    LYMPH 38.9 02/27/2015    MONO 0.3 02/27/2015    MONO 7.3 02/27/2015    EOS 0.0 02/27/2015    BASO 0.02 02/27/2015    EOSINOPHIL 0.9 02/27/2015    BASOPHIL 0.5 02/27/2015        CMP:  Sodium   Date Value Ref Range Status   03/14/2014 138 136 - 145 mmol/L Final     Sodium Level   Date Value Ref Range Status   02/23/2024 138 136 - 145 mmol/L Final     Potassium   Date Value Ref Range Status   03/14/2014 4.7 3.5 - 5.1 mmol/L Final     Comment:     *Slightly Hemolyzed     Potassium Level   Date Value Ref Range Status   02/23/2024 4.2 3.5 - 5.1 mmol/L Final     Chloride   Date Value Ref Range Status   03/14/2014 103 95 - 110 mmol/L Final     CO2   Date Value Ref Range Status   03/14/2014 24 23 - 29 mmol/L Final     Carbon Dioxide   Date Value Ref Range Status   02/23/2024 26 22 - 29 mmol/L Final     Glucose   Date Value Ref Range Status   03/14/2014 91 70 - 110 mg/dL Final     BUN   Date Value Ref Range Status   03/14/2014 11 6 - 20 mg/dL Final     Blood Urea Nitrogen   Date Value Ref Range Status   02/23/2024 12.5 8.9 - 20.6 mg/dL Final     Creatinine   Date Value Ref Range Status   02/23/2024 1.39 (H) 0.73 -  1.18 mg/dL Final   03/14/2014 1.2 0.5 - 1.4 mg/dL Final     Calcium   Date Value Ref Range Status   03/14/2014 9.6 8.7 - 10.5 mg/dL Final     Calcium Level Total   Date Value Ref Range Status   02/23/2024 9.2 8.4 - 10.2 mg/dL Final     Total Protein   Date Value Ref Range Status   03/14/2014 8.4 6.0 - 8.4 g/dL Final     Albumin   Date Value Ref Range Status   03/14/2014 4.7 3.5 - 5.2 g/dL Final     Albumin Level   Date Value Ref Range Status   02/23/2024 4.0 3.5 - 5.0 g/dL Final     Total Bilirubin   Date Value Ref Range Status   03/14/2014 1.5 (H) 0.1 - 1.0 mg/dL Final     Comment:     For infants and newborns, interpretation of results should be based  on gestational age, weight and in agreement with clinical  observations.  Premature Infant recommended reference ranges:  Up to 24 hours.............<8.0 mg/dL  Up to 48 hours............<12.0 mg/dL  3-5 days..................<15.0 mg/dL  6-29 days.................<15.0 mg/dL     Bilirubin Total   Date Value Ref Range Status   02/23/2024 0.5 <=1.5 mg/dL Final     Alkaline Phosphatase   Date Value Ref Range Status   02/23/2024 89 40 - 150 unit/L Final   03/14/2014 79 55 - 135 U/L Final     AST   Date Value Ref Range Status   03/14/2014 29 10 - 40 U/L Final     Aspartate Aminotransferase   Date Value Ref Range Status   02/23/2024 24 5 - 34 unit/L Final     ALT   Date Value Ref Range Status   03/14/2014 17 10 - 44 U/L Final     Alanine Aminotransferase   Date Value Ref Range Status   02/23/2024 24 0 - 55 unit/L Final     Anion Gap   Date Value Ref Range Status   03/14/2014 11 8 - 16 mmol/L Final     eGFR if    Date Value Ref Range Status   03/14/2014 >60.0 >60 mL/min/1.73 m^2 Final     eGFR if non    Date Value Ref Range Status   03/14/2014 >60.0 >60 mL/min/1.73 m^2 Final     Comment:     Calculation used to obtain the estimated glomerular filtration  rate (eGFR) is the CKD-EPI equation. Since race is unknown   in our information system,  the eGFR values for   -American and Non--American patients are given   for each creatinine result.     Estimated GFR-Non    Date Value Ref Range Status   04/25/2022 >60               Assessment:       1. Lymphadenopathy    2. Human immunodeficiency virus (HIV) disease        1) HIV/AIDS  2) Lymphadenopathy--no palpable lymph nodes. If need biopsy need to be from a larger LN (> 1 cm)--most likely reactive, benign  3) Anemia--stable  4) Leukopenia--stable  5) H/o PJP pneumonia, Syphilis and disseminated MAC   6) Pulmonary nodule--Increase in number but very small--subcm--> Infectious??--> resolved    Discussion:   Patient with history of HIV/AIDS presented with abdominal pain, fever, and lymphadenopathy.  Recently discharged from the hospital for PJP pneumonia and disseminated MAC.  Is currently on rifampin, ethambutol and clarithromycin.  He is also on ART therapy for HIV that was restarted on 10/11/2021.  He is also on dolutegravir since he started Rifampin.  He completed treatment course of Bactrim for his PJP on 11/8/2021.  Blood cultures negative.  He did have blood cultures positive for MAC during the 10/19/2021 hospitalization.  He is on so on prednisone that he will need to continue for at least 2 more weeks.    Patient lymphadenopathy is relatively acute as this lymphadenopathy was not as prominent back in October of this year.  Most likely related to his disease/infection but lymphoma is on the differential. Patient reluctant to undergo any type of procedure, excisional lymph node biopsy, at this time.  He would like to give time to the medications to work and reevaluate in couple of months.  Previous CT with resolution of pulmonary nodules and significant improvement of his lymphadenopathy.  Most recent CT with increase in size of axillary and inguinal LN.  Right axillary LN palpated and is about 2 cm. It was mobile and tender. Look benign on imaging.     I discussed with the  patient the results.  We discussed possible biopsy versus continue to follow closely.  At this time patient is doing well voices no concerns he can not feel any enlarged lymph nodes.  The PET-CT showed that right inguinal lymph node was a little bit smaller than was compared to CT scan in October of 2023.  Also cervical lymph node comes and goes.  Because of this he wants to continue to follow closely.  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  He will be back next month.  I will repeat CTs scan and compare adenopathy.         Plan:       Again this most likely secondary to his HIV rather than to lymphoma or any other malignancy. LAD Most likely reactive, benign.  Low-grade lymphoma still part of the differential.      PET CT done on 1/5/24. CT C/A/P done on 2/21/24 shows stability. Per Dr. Cage. Refer to surgery for eval for excisional biopsy of right inguinal LN  r/o AIDS related lymphoma  Continue ART Rx as per ID service  RTC in 6 weeks with MD for biopsy results, no labs       Encouraged to call for any questions or problems.  The patient was given ample opportunity to ask questions and they were all answered to satisfaction; patient demonstrated understanding of what we discussed and is agreeable to the plan.         RAKESH Olson

## 2024-02-26 NOTE — TELEPHONE ENCOUNTER
Patient informed of results and providers recommendations. All questions answered. Patient verbalized understanding.    Lay, please place pt on schedule for nurse visit today at 1030 Bicillin inj X1

## 2024-02-27 LAB
GAMMA INTERFERON BACKGROUND BLD IA-ACNC: 0.18 IU/ML
M TB IFN-G BLD-IMP: NEGATIVE
M TB IFN-G CD4+ BCKGRND COR BLD-ACNC: 0.06 IU/ML
M TB IFN-G CD4+CD8+ BCKGRND COR BLD-ACNC: 0.03 IU/ML
MITOGEN IGNF BCKGRD COR BLD-ACNC: 9.82 IU/ML

## 2024-03-27 NOTE — PROGRESS NOTES
History & Physical    CHIEF COMPLAINT:  LYMPHADENOPATHY     History of Present Illness:  33 year-old-male referred by Dr. Cage.  Patient is followed by her for a history of HIV, anemia and pulmonary nodules.  Surveillance PET scan done in January showed hypermetabolic cervical, axillary and inguinal lymph nodes.  CT abd/pel done in February showed stable borderline prominent right inguinal and axillary lymph nodes.  Dr. Cage requests excisional biopsy of right inguinal lymph node to rule out lymphoma.      Review of patient's allergies indicates:   Allergen Reactions    Banana      Other reaction(s): Pharyngeal edema    Benzodiazepines Hallucinations       Current Outpatient Medications   Medication Sig Dispense Refill    ergocalciferol (VITAMIN D2) 50,000 unit Cap Take 1 capsule (50,000 Units total) by mouth every 7 days. 12 capsule 1    TRIUMEQ 600- mg Tab Take 1 tablet by mouth once daily. 90 tablet 1     No current facility-administered medications for this visit.       Past Medical History:   Diagnosis Date    ADHD (attention deficit hyperactivity disorder)     Ages 5 through 13.  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  Family History   Problem Relation Age of Onset    Hypertension Mother     Migraines Father     Hypertension Father     Anxiety disorder Father     Diabetes Brother     Hypertension Brother      Social History     Tobacco Use    Smoking status: Never    Smokeless tobacco: Never   Substance Use Topics    Alcohol use: No    Drug use: Never        Review of Systems:  Review of Systems   Constitutional:  Negative for activity change, appetite change, chills, diaphoresis, fatigue and fever.   HENT:  Negative for congestion, ear pain, tinnitus and trouble swallowing.    Eyes:  Negative for photophobia and pain.   Respiratory:  Negative for apnea, cough, choking, chest tightness, shortness of  breath and stridor.    Cardiovascular:  Negative for chest pain, palpitations and leg swelling.   Endocrine: Negative for cold intolerance and heat intolerance.   Genitourinary:  Negative for difficulty urinating, dysuria, enuresis, flank pain, frequency and hematuria.   Musculoskeletal:  Negative for arthralgias, back pain and gait problem.   Neurological:  Negative for dizziness, seizures, syncope, facial asymmetry, speech difficulty, weakness, light-headedness, numbness and headaches.   Psychiatric/Behavioral:  Negative for agitation, behavioral problems, confusion and decreased concentration.    All other systems reviewed and are negative.      OBJECTIVE:     Vital Signs (Most Recent)              Physical Exam:  Physical Exam  Constitutional:       General: He is not in acute distress.     Appearance: Normal appearance. He is well-developed and normal weight. He is not ill-appearing, toxic-appearing or diaphoretic.   HENT:      Head: Normocephalic and atraumatic.      Right Ear: Hearing and external ear normal.      Left Ear: Hearing and external ear normal.      Nose: No congestion or rhinorrhea.      Mouth/Throat:      Mouth: Mucous membranes are moist.      Pharynx: Oropharynx is clear. No oropharyngeal exudate.   Eyes:      General: Lids are normal. Gaze aligned appropriately. No scleral icterus.     Extraocular Movements: Extraocular movements intact.      Right eye: Normal extraocular motion and no nystagmus.      Left eye: Normal extraocular motion and no nystagmus.      Conjunctiva/sclera: Conjunctivae normal.      Right eye: Right conjunctiva is not injected.      Left eye: Left conjunctiva is not injected.      Pupils: Pupils are equal, round, and reactive to light.   Neck:      Vascular: No carotid bruit or JVD.      Trachea: Trachea and phonation normal.   Cardiovascular:      Rate and Rhythm: Normal rate and regular rhythm.      Pulses: Normal pulses.      Heart sounds: Normal heart sounds. No  murmur heard.     No friction rub. No gallop.   Pulmonary:      Effort: Pulmonary effort is normal. No tachypnea, accessory muscle usage, respiratory distress or retractions.      Breath sounds: Normal breath sounds and air entry. No stridor or decreased air movement. No wheezing or rhonchi.   Chest:      Chest wall: No mass, deformity, swelling, tenderness or crepitus.   Abdominal:      General: Abdomen is flat. Bowel sounds are normal. There is no distension. There are no signs of injury.      Palpations: Abdomen is soft. There is no shifting dullness, fluid wave, hepatomegaly, splenomegaly or mass.      Tenderness: There is no abdominal tenderness. There is no guarding or rebound.      Hernia: No hernia is present.   Musculoskeletal:         General: No swelling or tenderness.      Cervical back: Normal range of motion and neck supple. No rigidity, tenderness or crepitus.   Lymphadenopathy:      Head:      Right side of head: No submental, submandibular or occipital adenopathy.      Left side of head: No submental, submandibular or occipital adenopathy.      Cervical: No cervical adenopathy.      Right cervical: No superficial or posterior cervical adenopathy.     Left cervical: No superficial or posterior cervical adenopathy.      Upper Body:      Right upper body: No supraclavicular or axillary adenopathy.      Left upper body: No supraclavicular or axillary adenopathy.      Lower Body: No right inguinal adenopathy. No left inguinal adenopathy.   Skin:     General: Skin is warm.      Capillary Refill: Capillary refill takes less than 2 seconds.      Coloration: Skin is not cyanotic, jaundiced, mottled or pale.      Findings: No bruising, ecchymosis, erythema, lesion, petechiae or rash.      Nails: There is no clubbing.   Neurological:      General: No focal deficit present.      Mental Status: He is alert and oriented to person, place, and time.      Cranial Nerves: No cranial nerve deficit or facial asymmetry.       Sensory: No sensory deficit.      Motor: No weakness, tremor, atrophy or abnormal muscle tone.      Coordination: Coordination normal.      Gait: Gait normal.      Deep Tendon Reflexes: Reflexes normal.   Psychiatric:         Attention and Perception: Attention and perception normal.         Mood and Affect: Mood normal. Mood is not anxious or depressed. Affect is not blunt or flat.         Speech: Speech normal. Speech is not slurred.         Behavior: Behavior normal. Behavior is cooperative.           ASSESSMENT/PLAN:     Lymphadenopathy, history of HIV    Schedule ultrasound-guided right, possible left inguinal lymph node excisional biopsy    The rationale for complete excision of the lymph node for adequate pathological exam was explained.  Questions addressed.    The risks and benefits of the procedure were explained in detail using layman terms, including the pros and cons of any implant that may be used, all questions were addressed, the patient gives consent to proceed    Jair Blackmon MD  Surgical Oncology  Complex General, Gastrointestinal and Hepatobiliary Surgery

## 2024-04-02 ENCOUNTER — OFFICE VISIT (OUTPATIENT)
Dept: SURGICAL ONCOLOGY | Facility: CLINIC | Age: 34
End: 2024-04-02
Payer: COMMERCIAL

## 2024-04-02 VITALS
HEART RATE: 66 BPM | SYSTOLIC BLOOD PRESSURE: 138 MMHG | HEIGHT: 66 IN | WEIGHT: 166.63 LBS | BODY MASS INDEX: 26.78 KG/M2 | DIASTOLIC BLOOD PRESSURE: 84 MMHG

## 2024-04-02 DIAGNOSIS — R59.1 LYMPHADENOPATHY: ICD-10-CM

## 2024-04-02 DIAGNOSIS — B20 HUMAN IMMUNODEFICIENCY VIRUS (HIV) DISEASE: ICD-10-CM

## 2024-04-02 PROCEDURE — 99203 OFFICE O/P NEW LOW 30 MIN: CPT | Mod: S$GLB,,, | Performed by: SURGERY

## 2024-04-02 PROCEDURE — 3008F BODY MASS INDEX DOCD: CPT | Mod: CPTII,S$GLB,, | Performed by: SURGERY

## 2024-04-02 PROCEDURE — 3075F SYST BP GE 130 - 139MM HG: CPT | Mod: CPTII,S$GLB,, | Performed by: SURGERY

## 2024-04-02 PROCEDURE — 1159F MED LIST DOCD IN RCRD: CPT | Mod: CPTII,S$GLB,, | Performed by: SURGERY

## 2024-04-02 PROCEDURE — 99999 PR PBB SHADOW E&M-EST. PATIENT-LVL III: CPT | Mod: PBBFAC,,, | Performed by: SURGERY

## 2024-04-02 PROCEDURE — 3044F HG A1C LEVEL LT 7.0%: CPT | Mod: CPTII,S$GLB,, | Performed by: SURGERY

## 2024-04-02 PROCEDURE — 3079F DIAST BP 80-89 MM HG: CPT | Mod: CPTII,S$GLB,, | Performed by: SURGERY

## 2024-04-04 DIAGNOSIS — R59.0 LOCALIZED ENLARGED LYMPH NODES: ICD-10-CM

## 2024-04-04 DIAGNOSIS — R59.1 LYMPHADENOPATHY: Primary | ICD-10-CM

## 2024-04-08 ENCOUNTER — OFFICE VISIT (OUTPATIENT)
Dept: HEMATOLOGY/ONCOLOGY | Facility: CLINIC | Age: 34
End: 2024-04-08
Payer: COMMERCIAL

## 2024-04-08 ENCOUNTER — LAB VISIT (OUTPATIENT)
Dept: LAB | Facility: HOSPITAL | Age: 34
End: 2024-04-08
Attending: INTERNAL MEDICINE
Payer: COMMERCIAL

## 2024-04-08 VITALS
RESPIRATION RATE: 17 BRPM | WEIGHT: 166 LBS | SYSTOLIC BLOOD PRESSURE: 155 MMHG | HEART RATE: 63 BPM | TEMPERATURE: 99 F | BODY MASS INDEX: 26.68 KG/M2 | HEIGHT: 66 IN | DIASTOLIC BLOOD PRESSURE: 80 MMHG | OXYGEN SATURATION: 99 %

## 2024-04-08 DIAGNOSIS — R59.1 LYMPHADENOPATHY: ICD-10-CM

## 2024-04-08 DIAGNOSIS — D70.9 NEUTROPENIA, UNSPECIFIED TYPE: ICD-10-CM

## 2024-04-08 DIAGNOSIS — R59.1 LYMPHADENOPATHY: Primary | ICD-10-CM

## 2024-04-08 DIAGNOSIS — B20 HUMAN IMMUNODEFICIENCY VIRUS (HIV) DISEASE: ICD-10-CM

## 2024-04-08 DIAGNOSIS — R59.0 LOCALIZED ENLARGED LYMPH NODES: ICD-10-CM

## 2024-04-08 LAB
ALBUMIN SERPL-MCNC: 4 G/DL (ref 3.5–5)
ALBUMIN/GLOB SERPL: 1.2 RATIO (ref 1.1–2)
ALP SERPL-CCNC: 88 UNIT/L (ref 40–150)
ALT SERPL-CCNC: 17 UNIT/L (ref 0–55)
AST SERPL-CCNC: 19 UNIT/L (ref 5–34)
BASOPHILS # BLD AUTO: 0.02 X10(3)/MCL
BASOPHILS NFR BLD AUTO: 0.4 %
BILIRUB SERPL-MCNC: 0.4 MG/DL
BUN SERPL-MCNC: 16.6 MG/DL (ref 8.9–20.6)
CALCIUM SERPL-MCNC: 9.3 MG/DL (ref 8.4–10.2)
CHLORIDE SERPL-SCNC: 106 MMOL/L (ref 98–107)
CO2 SERPL-SCNC: 25 MMOL/L (ref 22–29)
CREAT SERPL-MCNC: 1.65 MG/DL (ref 0.73–1.18)
EOSINOPHIL # BLD AUTO: 0.31 X10(3)/MCL (ref 0–0.9)
EOSINOPHIL NFR BLD AUTO: 6.4 %
ERYTHROCYTE [DISTWIDTH] IN BLOOD BY AUTOMATED COUNT: 12.1 % (ref 11.5–17)
GFR SERPLBLD CREATININE-BSD FMLA CKD-EPI: 56 MLS/MIN/1.73/M2
GLOBULIN SER-MCNC: 3.3 GM/DL (ref 2.4–3.5)
GLUCOSE SERPL-MCNC: 92 MG/DL (ref 74–100)
HCT VFR BLD AUTO: 42 % (ref 42–52)
HGB BLD-MCNC: 14.3 G/DL (ref 14–18)
IMM GRANULOCYTES # BLD AUTO: 0 X10(3)/MCL (ref 0–0.04)
IMM GRANULOCYTES NFR BLD AUTO: 0 %
LYMPHOCYTES # BLD AUTO: 2.74 X10(3)/MCL (ref 0.6–4.6)
LYMPHOCYTES NFR BLD AUTO: 56.4 %
MCH RBC QN AUTO: 30.7 PG (ref 27–31)
MCHC RBC AUTO-ENTMCNC: 34 G/DL (ref 33–36)
MCV RBC AUTO: 90.1 FL (ref 80–94)
MONOCYTES # BLD AUTO: 0.34 X10(3)/MCL (ref 0.1–1.3)
MONOCYTES NFR BLD AUTO: 7 %
NEUTROPHILS # BLD AUTO: 1.45 X10(3)/MCL (ref 2.1–9.2)
NEUTROPHILS NFR BLD AUTO: 29.8 %
PLATELET # BLD AUTO: 224 X10(3)/MCL (ref 130–400)
PMV BLD AUTO: 9.1 FL (ref 7.4–10.4)
POTASSIUM SERPL-SCNC: 4.2 MMOL/L (ref 3.5–5.1)
PROT SERPL-MCNC: 7.3 GM/DL (ref 6.4–8.3)
RBC # BLD AUTO: 4.66 X10(6)/MCL (ref 4.7–6.1)
SODIUM SERPL-SCNC: 141 MMOL/L (ref 136–145)
WBC # SPEC AUTO: 4.86 X10(3)/MCL (ref 4.5–11.5)

## 2024-04-08 PROCEDURE — 99999 PR PBB SHADOW E&M-EST. PATIENT-LVL IV: CPT | Mod: PBBFAC,,, | Performed by: INTERNAL MEDICINE

## 2024-04-08 PROCEDURE — 3044F HG A1C LEVEL LT 7.0%: CPT | Mod: CPTII,S$GLB,, | Performed by: INTERNAL MEDICINE

## 2024-04-08 PROCEDURE — 80053 COMPREHEN METABOLIC PANEL: CPT

## 2024-04-08 PROCEDURE — 3008F BODY MASS INDEX DOCD: CPT | Mod: CPTII,S$GLB,, | Performed by: INTERNAL MEDICINE

## 2024-04-08 PROCEDURE — 1159F MED LIST DOCD IN RCRD: CPT | Mod: CPTII,S$GLB,, | Performed by: INTERNAL MEDICINE

## 2024-04-08 PROCEDURE — 85025 COMPLETE CBC W/AUTO DIFF WBC: CPT

## 2024-04-08 PROCEDURE — 3077F SYST BP >= 140 MM HG: CPT | Mod: CPTII,S$GLB,, | Performed by: INTERNAL MEDICINE

## 2024-04-08 PROCEDURE — 36415 COLL VENOUS BLD VENIPUNCTURE: CPT

## 2024-04-08 PROCEDURE — 3079F DIAST BP 80-89 MM HG: CPT | Mod: CPTII,S$GLB,, | Performed by: INTERNAL MEDICINE

## 2024-04-08 PROCEDURE — 99214 OFFICE O/P EST MOD 30 MIN: CPT | Mod: S$GLB,,, | Performed by: INTERNAL MEDICINE

## 2024-04-08 NOTE — PROGRESS NOTES
HEMATOLOGY/ONCOLOGY OFFICE CLINIC VISIT      Visit Information:    Initial Evaluation: 11/30/2021  Ref[facility] Provider[facility]  Other providers:  Code status:     Diagnosis:  Lymphadenopathy  HIV +     Present treatment: N/A     Treatment/Oncology history: N/A     Plan: surveillance      Imaging:  CT angiogram 11/20/2021:No evidence of pulmonary thromboembolism. Enlarged mediastinal lymph nodes extending into the bilateral vj. On the right largest conglomeration of lymph nodes measures approximately 1.8 x 3.3 cm. This concepcion prominence is seen throughout the mediastinum as well as enlarged lymph node in the left neck measuring 1.3 cm. Pulmonary findings have resolved in the interval with mediastinal adenopathy now present. Findings are most likely reactive from recent infectious process, however recommend continued short interval follow-up to resolution to exclude lymphoma.   CT Thorax 1/19/2022: Clustered solid nodules within the posterior portion of the right lower lobe. Individual nodules measure up to 6 mm. Additional scattered small solid nodules within the remaining lobes, increased in number since prior study.  Right hilar lymph node measures 15 mm in short axis, stable. A left hilar lymph node also measures 15 mm in short axis, slightly smaller since n. Subcarinal lymph node measures 20 mm stable. No new lymphadenopathy appreciated.  Left lower cervical lymph node measuring 15 mm in short axis , not significantly changed. A right lower cervical lymph node measures 13 mm, stable. Retrocrural lymph nodes are mildly larger since prior exam. Reference right retrocrural lymph node now measures 12 mm, previously 8 mm. There is a partially visualized concepcion conglomerate between the left kidney and pancreas measuring about 50 mm in transverse diameter.    CT C/A/P 5/3/2022: Resolution of right lower lobe nodules seen on prior CT.  No enlarging pulmonary nodule or consolidation on current exam. Reference right  hilar lymph node measures 9 mm in short axis, decreased from 22 mm before.  Reference left hilar lymph node also smaller, measuring about 6 mm in short axis.  Subcarinal lymph node has also decreased in size, now measuring 7 mm in short axis compared to 25 mm before.  No new lymphadenopathy identified. Abdominal/pelvic lymph nodes: Significant improvement in gastrohepatic, retrocrural and retroperitoneal lymphadenopathy since November.  For reference, a left periaortic lymph node now measures 9 mm in short axis, previously 20 mm.  Impression: 1. Improved lymphadenopathy since prior exams.  No new pathologically enlarged lymph nodes identified. 2. Decreased number of pulmonary nodules since 01/19/2022.  CT C/A/P 11/29/22:  Impression: Scattered lymph nodes in the periaortic region, bilateral pelvis, and inguinal regions bilaterally which are centrally stable in the interval.  No intrathoracic lymphadenopathy or pulmonary nodules. Overall no interval change from the prior.  CT C/A/P 10/3/2023:  Mildly prominent bilateral axillary and inguinal lymph nodes which have enlarged from the prior exam, however demonstrate a preserved fatty hilum.  These are indeterminate on this exam.  PET CT 1/5/2024: Blood pool max SUV 2.1. Background liver max SUV 3.3.  There are scattered hypermetabolic cervical lymph nodes.  For example on the left measures 5 mm short axis with max SUV 5.2. There are hypermetabolic bilateral axillary lymph nodes.  On the right measures 14 mm  with max SUV 5.3.  There is some vague anterior mediastinal soft tissue with max SUV 3.1. There are some moderately hypermetabolic inguinal lymph nodes.  right measures 10 mm  with max SUV 3.3.  CT C/A/P 2/21/2024: Stable borderline prominent right inguinal and axillary lymph nodes.       Pathology:    CLINICAL HISTORY:       Patient: Azael Hodge is a 33 y.o. male. with multiple medical problems including HIV/AIDS d/c from the hospital 11/23/2021 and  returned to the ER on 11/25/2021 with abd pain and lower back pain, also reports nausea and constipation, denies any vomiting. Pt is a poor historian and has difficulty describing what is wrong with him. Pt recently admitted for pneumonia. Pt denies any chest pain or SOB. He also presented  with fever.    CT angiogram 11/20/2021 done when patient presented with SOB on previous hospitalization showed enlarged mediastinal lymph nodes extending into the bilateral vj. On the right largest conglomeration of lymph nodes measures approximately 1.8 x 3.3 cm. This concepcion prominence is seen throughout the mediastinum as well as enlarged lymph node in the left neck measuring 1.3 cm. No pulmonary embolism.     Upon evaluation at the ER, CT A/P 11/25/2021: There are multiple enlarged discrete lymph nodes at the juan antonio hepatis, celiac region, root of the mesentery, retrocrural space and retroperitoneum, the largest measures approximately 3.7 cm in the left paraaortic region. Consider possible lymphoma. There is trace pericholecystic fluid. No radiodense gallstone or wall edema is seen. Considerations include early acute cholecystitis and reaction to adjacent hepatitis. Ultrasound may be helpful for more definitive characterization if needed. Mild hepatomegaly. There is some concentric mural thickening of the anorectum which may represent proctitis.     Of note CT C/A/P 10/19/2021 with Findings consistent with bilateral atypical pneumonia. Lymph nodes: There are scattered reactive-sized mediastinal lymph nodes. No pathologic concepcion enlargement or necrotic adenopathy. Otherwise, no evidence of acute or suspicious focal abnormality within the chest, abdomen, or pelvis. Incidentally noted gallbladder sludge without calcified stone or biliary obstruction.     Blood work with normal WBCs, he was hyponatremic with a sodium of 122, acidotic with a CO2 of 15, lactic acid 1 and lipase 18.  He was tachycardic but normotensive.  UA with trace  of leukocyte esterase, 2+ protein, 1+ glucose and 1+ bilirubin.  Covid rapid test was negative.  CD4 on 11/18/2021 was 101.  Patient has been followed by infectious disease services.     We were consulted to evaluate for possible lymphoma.  Patient is seen in rounds and he is laying in bed.  He is by himself.  He looks chronically ill.  Abdomen is distended but he reports that pain is much improved. Patient had 1 unit of blood transfusion on 11/28/2021 for a hemoglobin of 7.6.  Today hemoglobin 9.3.  Hyponatremia improved.  Today sodium 134, CO2 20.        Chief Complaint: 6 Week Follow Up      Interval History:  Patient presents to clinic today for follow up. He was referred to Dr. Blackmon for excisional biopsy of right inguinal LN  r/o AIDS related lymphoma. He has not been scheduled as of yet, trying to work around his work schedule, planning for next month.  He is doing well. Denies fever, chills or sweats. No swollen glands or weight loss.        ROS:All 14 points ROS taken and as per Interval History  Review of Systems   Constitutional:  Negative for chills, fever, malaise/fatigue and weight loss.   HENT:  Negative for nosebleeds and sore throat.    Eyes: Negative.    Respiratory:  Negative for cough, hemoptysis and shortness of breath.    Cardiovascular:  Negative for chest pain, palpitations and leg swelling.   Gastrointestinal:  Negative for abdominal pain, blood in stool, constipation, diarrhea, melena, nausea and vomiting.   Genitourinary:  Negative for dysuria, frequency, hematuria and urgency.   Musculoskeletal:  Negative for back pain, falls, joint pain and myalgias.   Skin:  Negative for rash.   Neurological:  Negative for dizziness, tremors, seizures, weakness and headaches.   Endo/Heme/Allergies: Negative.    Psychiatric/Behavioral:  Negative for suicidal ideas. The patient is not nervous/anxious.      Histories:  PMH/PSH/FH/SOCIAL/ALLERGIES AND MEDS REVIEWED AND UPDATED AS APPROPRIATE        "    Vitals:    04/08/24 0902   BP: (!) 155/80   BP Location: Left arm   Patient Position: Sitting   Pulse: 63   Resp: 17   Temp: 98.5 °F (36.9 °C)   TempSrc: Oral   SpO2: 99%   Weight: 75.3 kg (166 lb)   Height: 5' 6" (1.676 m)        Physical Exam  Constitutional:       Appearance: Normal appearance.   HENT:      Head: Normocephalic and atraumatic.   Eyes:      Extraocular Movements: Extraocular movements intact.   Pulmonary:      Effort: Pulmonary effort is normal.   Musculoskeletal:      Cervical back: Neck supple.   Lymphadenopathy:      Upper Body:      Right upper body: Axillary adenopathy present.      Left upper body: Axillary adenopathy present.      Lower Body: Right inguinal adenopathy present.   Neurological:      Mental Status: He is alert and oriented to person, place, and time.   Psychiatric:         Mood and Affect: Mood normal.         Behavior: Behavior normal.         Judgment: Judgment normal.       ECOG SCORE    0 - Fully active-able to carry on all pre-disease performance without restriction         Laboratory:  CBC with Differential:  Lab Results   Component Value Date    WBC 4.86 04/08/2024    RBC 4.66 (L) 04/08/2024    HGB 14.3 04/08/2024    HCT 42.0 04/08/2024    MCV 90.1 04/08/2024    MCH 30.7 04/08/2024    MCHC 34.0 04/08/2024    RDW 12.1 04/08/2024     04/08/2024    MPV 9.1 04/08/2024    GRAN 2.3 02/27/2015    GRAN 52.2 02/27/2015    LYMPH 1.7 02/27/2015    LYMPH 38.9 02/27/2015    MONO 0.3 02/27/2015    MONO 7.3 02/27/2015    EOS 0.0 02/27/2015    BASO 0.02 02/27/2015    EOSINOPHIL 0.9 02/27/2015    BASOPHIL 0.5 02/27/2015        CMP:  Sodium   Date Value Ref Range Status   03/14/2014 138 136 - 145 mmol/L Final     Sodium Level   Date Value Ref Range Status   02/23/2024 138 136 - 145 mmol/L Final     Potassium   Date Value Ref Range Status   03/14/2014 4.7 3.5 - 5.1 mmol/L Final     Comment:     *Slightly Hemolyzed     Potassium Level   Date Value Ref Range Status   02/23/2024 " 4.2 3.5 - 5.1 mmol/L Final     Chloride   Date Value Ref Range Status   03/14/2014 103 95 - 110 mmol/L Final     CO2   Date Value Ref Range Status   03/14/2014 24 23 - 29 mmol/L Final     Carbon Dioxide   Date Value Ref Range Status   02/23/2024 26 22 - 29 mmol/L Final     Glucose   Date Value Ref Range Status   03/14/2014 91 70 - 110 mg/dL Final     BUN   Date Value Ref Range Status   03/14/2014 11 6 - 20 mg/dL Final     Blood Urea Nitrogen   Date Value Ref Range Status   02/23/2024 12.5 8.9 - 20.6 mg/dL Final     Creatinine   Date Value Ref Range Status   02/23/2024 1.39 (H) 0.73 - 1.18 mg/dL Final   03/14/2014 1.2 0.5 - 1.4 mg/dL Final     Calcium   Date Value Ref Range Status   03/14/2014 9.6 8.7 - 10.5 mg/dL Final     Calcium Level Total   Date Value Ref Range Status   02/23/2024 9.2 8.4 - 10.2 mg/dL Final     Total Protein   Date Value Ref Range Status   03/14/2014 8.4 6.0 - 8.4 g/dL Final     Albumin   Date Value Ref Range Status   03/14/2014 4.7 3.5 - 5.2 g/dL Final     Albumin Level   Date Value Ref Range Status   02/23/2024 4.0 3.5 - 5.0 g/dL Final     Total Bilirubin   Date Value Ref Range Status   03/14/2014 1.5 (H) 0.1 - 1.0 mg/dL Final     Comment:     For infants and newborns, interpretation of results should be based  on gestational age, weight and in agreement with clinical  observations.  Premature Infant recommended reference ranges:  Up to 24 hours.............<8.0 mg/dL  Up to 48 hours............<12.0 mg/dL  3-5 days..................<15.0 mg/dL  6-29 days.................<15.0 mg/dL     Bilirubin Total   Date Value Ref Range Status   02/23/2024 0.5 <=1.5 mg/dL Final     Alkaline Phosphatase   Date Value Ref Range Status   02/23/2024 89 40 - 150 unit/L Final   03/14/2014 79 55 - 135 U/L Final     AST   Date Value Ref Range Status   03/14/2014 29 10 - 40 U/L Final     Aspartate Aminotransferase   Date Value Ref Range Status   02/23/2024 24 5 - 34 unit/L Final     ALT   Date Value Ref Range  Status   03/14/2014 17 10 - 44 U/L Final     Alanine Aminotransferase   Date Value Ref Range Status   02/23/2024 24 0 - 55 unit/L Final     Anion Gap   Date Value Ref Range Status   03/14/2014 11 8 - 16 mmol/L Final     eGFR if    Date Value Ref Range Status   03/14/2014 >60.0 >60 mL/min/1.73 m^2 Final     eGFR if non    Date Value Ref Range Status   03/14/2014 >60.0 >60 mL/min/1.73 m^2 Final     Comment:     Calculation used to obtain the estimated glomerular filtration  rate (eGFR) is the CKD-EPI equation. Since race is unknown   in our information system, the eGFR values for   -American and Non--American patients are given   for each creatinine result.     Estimated GFR-Non    Date Value Ref Range Status   04/25/2022 >60           Assessment:       1) HIV/AIDS  2) Lymphadenopathy--stable  3) Anemia--resolved  4) Leukopenia--better but mild neutropenia  5) H/o PJP pneumonia, Syphilis and disseminated MAC   6) Pulmonary nodule--Increase in number but very small--subcm--> Infectious??--> resolved    Discussion:   Patient with history of HIV/AIDS presented with abdominal pain, fever, and lymphadenopathy.  Recently discharged from the hospital for PJP pneumonia and disseminated MAC.  Is currently on rifampin, ethambutol and clarithromycin.  He is also on ART therapy for HIV that was restarted on 10/11/2021.  He is also on dolutegravir since he started Rifampin.  He completed treatment course of Bactrim for his PJP on 11/8/2021.  Blood cultures negative.  He did have blood cultures positive for MAC during the 10/19/2021 hospitalization.  He is on so on prednisone that he will need to continue for at least 2 more weeks.    Patient lymphadenopathy is relatively acute as this lymphadenopathy was not as prominent back in October of this year.  Most likely related to his disease/infection but lymphoma is on the differential. Patient reluctant to undergo any type  of procedure, excisional lymph node biopsy, at this time.  He would like to give time to the medications to work and reevaluate in couple of months.  Previous CT with resolution of pulmonary nodules and significant improvement of his lymphadenopathy.  Most recent CT with increase in size of axillary and inguinal LN.  Right axillary LN palpated and is about 2 cm. It was mobile and tender. Look benign on imaging.     I discussed with the patient the results.  We discussed possible biopsy versus continue to follow closely.  At this time patient is doing well voices no concerns he can not feel any enlarged lymph nodes.  The PET-CT showed that right inguinal lymph node was a little bit smaller than was compared to CT scan in October of 2023.  Also cervical lymph node comes and goes.  Because of this he wants to continue to follow closely.  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  He will be back next month.  I will repeat CTs scan and compare adenopathy.         Plan:       Again this most likely secondary to his HIV rather than to lymphoma or any other malignancy. LAD Most likely reactive, benign.  Low-grade lymphoma still part of the differential.      PET CT done on 1/5/24. CT C/A/P done on 2/21/24 shows stability. Per Dr. Cage. Refer to surgery for eval for excisional biopsy of right inguinal LN  r/o AIDS related lymphoma  Continue ART Rx as per ID service  RTC in 5 weeks with MD for biopsy results     Encouraged to call for any questions or problems.  The patient was given ample opportunity to ask questions and they were all answered to satisfaction; patient demonstrated understanding of what we discussed and is agreeable to the plan.    Cheli Cage MD  Hematology/Oncology      Professional Services   I, Valorie Collins LPN, acted solely as a scribe for and in the presence of Dr. Cheli Cage, who performed these services.

## 2024-04-18 DIAGNOSIS — R59.1 LYMPHADENOPATHY: Primary | ICD-10-CM

## 2024-04-18 RX ORDER — ENOXAPARIN SODIUM 300 MG/3ML
30 INJECTION INTRAVENOUS; SUBCUTANEOUS EVERY 24 HOURS
Status: CANCELLED | OUTPATIENT
Start: 2024-04-18

## 2024-05-14 ENCOUNTER — DOCUMENTATION ONLY (OUTPATIENT)
Dept: SURGICAL ONCOLOGY | Facility: CLINIC | Age: 34
End: 2024-05-14
Payer: COMMERCIAL

## 2024-05-14 NOTE — PROGRESS NOTES
Received phone call from patient.  He tells me his insurance is not yet effective.  He will call our office back when it does go into effect so we can reschedule his procedure with Dr. Blackmon.

## 2024-05-14 NOTE — PROGRESS NOTES
Mr. Hodge is scheduled to have a lymph node biopsy with Dr. Blackmon tomorrow 5/15/24 however he has no current medical insurance. We have been waiting for him to call to give coverage or let us know if he wants to proceed with the procedure as out of pocket pay but he does not return phone calls. We will be cancelling his procedure for now until we hear back from him.

## 2024-07-11 ENCOUNTER — DOCUMENTATION ONLY (OUTPATIENT)
Dept: INFECTIOUS DISEASES | Facility: CLINIC | Age: 34
End: 2024-07-11
Payer: COMMERCIAL

## 2024-07-25 DIAGNOSIS — N18.9 CHRONIC KIDNEY DISEASE, UNSPECIFIED CKD STAGE: Primary | ICD-10-CM

## 2024-07-31 ENCOUNTER — PATIENT MESSAGE (OUTPATIENT)
Dept: NEPHROLOGY | Facility: CLINIC | Age: 34
End: 2024-07-31

## 2024-08-01 ENCOUNTER — DOCUMENTATION ONLY (OUTPATIENT)
Dept: NEPHROLOGY | Facility: CLINIC | Age: 34
End: 2024-08-01

## 2024-08-01 NOTE — PROGRESS NOTES
Patient was no-show to clinic today. If patient calls back to reschedule, please schedule within 4-6 months.  Thank you

## 2024-08-27 ENCOUNTER — LAB VISIT (OUTPATIENT)
Dept: LAB | Facility: HOSPITAL | Age: 34
End: 2024-08-27
Attending: NURSE PRACTITIONER
Payer: COMMERCIAL

## 2024-08-27 ENCOUNTER — TELEPHONE (OUTPATIENT)
Dept: INFECTIOUS DISEASES | Facility: CLINIC | Age: 34
End: 2024-08-27
Payer: COMMERCIAL

## 2024-08-27 ENCOUNTER — OFFICE VISIT (OUTPATIENT)
Dept: INFECTIOUS DISEASES | Facility: CLINIC | Age: 34
End: 2024-08-27
Payer: COMMERCIAL

## 2024-08-27 VITALS
WEIGHT: 178.56 LBS | TEMPERATURE: 98 F | RESPIRATION RATE: 16 BRPM | HEART RATE: 65 BPM | BODY MASS INDEX: 28.7 KG/M2 | HEIGHT: 66 IN | DIASTOLIC BLOOD PRESSURE: 84 MMHG | SYSTOLIC BLOOD PRESSURE: 132 MMHG

## 2024-08-27 DIAGNOSIS — E55.9 VITAMIN D DEFICIENCY: ICD-10-CM

## 2024-08-27 DIAGNOSIS — Z86.19 HISTORY OF SYPHILIS: ICD-10-CM

## 2024-08-27 DIAGNOSIS — B20 AIDS (ACQUIRED IMMUNODEFICIENCY SYNDROME), CD4 >=500: Primary | ICD-10-CM

## 2024-08-27 DIAGNOSIS — Z11.3 ROUTINE SCREENING FOR STI (SEXUALLY TRANSMITTED INFECTION): ICD-10-CM

## 2024-08-27 DIAGNOSIS — N28.9 RENAL INSUFFICIENCY: ICD-10-CM

## 2024-08-27 DIAGNOSIS — N18.9 CHRONIC KIDNEY DISEASE, UNSPECIFIED CKD STAGE: ICD-10-CM

## 2024-08-27 DIAGNOSIS — B20 AIDS (ACQUIRED IMMUNODEFICIENCY SYNDROME), CD4 >=500: ICD-10-CM

## 2024-08-27 LAB
ALBUMIN SERPL-MCNC: 4.3 G/DL (ref 3.5–5)
ALBUMIN/GLOB SERPL: 1.1 RATIO (ref 1.1–2)
ALP SERPL-CCNC: 83 UNIT/L (ref 40–150)
ALT SERPL-CCNC: 19 UNIT/L (ref 0–55)
ANION GAP SERPL CALC-SCNC: 5 MEQ/L
AST SERPL-CCNC: 26 UNIT/L (ref 5–34)
BASOPHILS # BLD AUTO: 0.05 X10(3)/MCL
BASOPHILS NFR BLD AUTO: 1.1 %
BILIRUB SERPL-MCNC: 0.7 MG/DL
BUN SERPL-MCNC: 10.6 MG/DL (ref 8.9–20.6)
C TRACH DNA SPEC QL NAA+PROBE: NOT DETECTED
C TRACH DNA SPEC QL NAA+PROBE: NOT DETECTED
CALCIUM SERPL-MCNC: 9.9 MG/DL (ref 8.4–10.2)
CHLORIDE SERPL-SCNC: 105 MMOL/L (ref 98–107)
CO2 SERPL-SCNC: 26 MMOL/L (ref 22–29)
CREAT SERPL-MCNC: 1.42 MG/DL (ref 0.73–1.18)
CREAT/UREA NIT SERPL: 7
EOSINOPHIL # BLD AUTO: 0.14 X10(3)/MCL (ref 0–0.9)
EOSINOPHIL NFR BLD AUTO: 3 %
ERYTHROCYTE [DISTWIDTH] IN BLOOD BY AUTOMATED COUNT: 12.3 % (ref 11.5–17)
GFR SERPLBLD CREATININE-BSD FMLA CKD-EPI: >60 ML/MIN/1.73/M2
GLOBULIN SER-MCNC: 3.8 GM/DL (ref 2.4–3.5)
GLUCOSE SERPL-MCNC: 91 MG/DL (ref 74–100)
HCT VFR BLD AUTO: 45 % (ref 42–52)
HGB BLD-MCNC: 15.1 G/DL (ref 14–18)
IMM GRANULOCYTES # BLD AUTO: 0 X10(3)/MCL (ref 0–0.04)
IMM GRANULOCYTES NFR BLD AUTO: 0 %
LYMPHOCYTES # BLD AUTO: 2.27 X10(3)/MCL (ref 0.6–4.6)
LYMPHOCYTES NFR BLD AUTO: 49.3 %
MCH RBC QN AUTO: 30.3 PG (ref 27–31)
MCHC RBC AUTO-ENTMCNC: 33.6 G/DL (ref 33–36)
MCV RBC AUTO: 90.4 FL (ref 80–94)
MONOCYTES # BLD AUTO: 0.29 X10(3)/MCL (ref 0.1–1.3)
MONOCYTES NFR BLD AUTO: 6.3 %
N GONORRHOEA DNA SPEC QL NAA+PROBE: NOT DETECTED
N GONORRHOEA DNA SPEC QL NAA+PROBE: NOT DETECTED
NEUTROPHILS # BLD AUTO: 1.85 X10(3)/MCL (ref 2.1–9.2)
NEUTROPHILS NFR BLD AUTO: 40.3 %
NRBC BLD AUTO-RTO: 0 %
PLATELET # BLD AUTO: 248 X10(3)/MCL (ref 130–400)
PMV BLD AUTO: 9.5 FL (ref 7.4–10.4)
POTASSIUM SERPL-SCNC: 4.2 MMOL/L (ref 3.5–5.1)
PROT SERPL-MCNC: 8.1 GM/DL (ref 6.4–8.3)
RBC # BLD AUTO: 4.98 X10(6)/MCL (ref 4.7–6.1)
RPR SER QL: REACTIVE
RPR SER-TITR: ABNORMAL {TITER}
SODIUM SERPL-SCNC: 136 MMOL/L (ref 136–145)
SOURCE (OHS): NORMAL
SOURCE (OHS): NORMAL
T PALLIDUM AB SER QL: REACTIVE
WBC # BLD AUTO: 4.6 X10(3)/MCL (ref 4.5–11.5)

## 2024-08-27 PROCEDURE — 36415 COLL VENOUS BLD VENIPUNCTURE: CPT

## 2024-08-27 PROCEDURE — 99214 OFFICE O/P EST MOD 30 MIN: CPT | Mod: PBBFAC | Performed by: NURSE PRACTITIONER

## 2024-08-27 PROCEDURE — 87591 N.GONORRHOEAE DNA AMP PROB: CPT | Performed by: NURSE PRACTITIONER

## 2024-08-27 PROCEDURE — 86592 SYPHILIS TEST NON-TREP QUAL: CPT

## 2024-08-27 PROCEDURE — 87536 HIV-1 QUANT&REVRSE TRNSCRPJ: CPT

## 2024-08-27 PROCEDURE — 86780 TREPONEMA PALLIDUM: CPT

## 2024-08-27 PROCEDURE — 85025 COMPLETE CBC W/AUTO DIFF WBC: CPT

## 2024-08-27 PROCEDURE — 86360 T CELL ABSOLUTE COUNT/RATIO: CPT

## 2024-08-27 PROCEDURE — 80053 COMPREHEN METABOLIC PANEL: CPT

## 2024-08-27 PROCEDURE — 99214 OFFICE O/P EST MOD 30 MIN: CPT | Mod: S$PBB,,, | Performed by: NURSE PRACTITIONER

## 2024-08-27 PROCEDURE — 87491 CHLMYD TRACH DNA AMP PROBE: CPT | Mod: 91 | Performed by: NURSE PRACTITIONER

## 2024-08-27 RX ORDER — ABACAVIR SULFATE, DOLUTEGRAVIR SODIUM, LAMIVUDINE 600; 50; 300 MG/1; MG/1; MG/1
1 TABLET, FILM COATED ORAL DAILY
Qty: 90 TABLET | Refills: 1 | Status: SHIPPED | OUTPATIENT
Start: 2024-08-27

## 2024-08-27 RX ORDER — ERGOCALCIFEROL 1.25 MG/1
50000 CAPSULE ORAL
Qty: 12 CAPSULE | Refills: 1 | Status: SHIPPED | OUTPATIENT
Start: 2024-08-27 | End: 2025-02-11

## 2024-08-27 NOTE — PROGRESS NOTES
Patient ID: Azael Hodge 34 y.o.     Chief Complaint:   Chief Complaint   Patient presents with    Follow-up     B20        HPI:    Azael is a 35 y/o AAM here for AIDS f/u. MSM. Dx 3/7/16. HLA  negative. 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 Pt was treated for disseminated MAC from 10/2021-11/2022.  He reports adherence to Triumeq, tolerating the meds well, but is interested in switching to an injectable if possible. There are no past HIV GT available. Pt states Triumeq is the only medication that he has ever taken. Pt denies fever, headache, chills, visual problems, N/V/D, SOB, cough, chest pain or abd pain.   Hx of syphilis.   Treated with Bicillin LA 2.4 million units x 1 3/1/19.  RPR titer at that time was 1:128.   Treated again 3/30/23 with Bicillin LA 2.4 million units x 1. RPR remained elevated at 1:64 on 9/27/23.    Retreated 9/2023 with Doxycycline 100 mg 1 po BID x 28 days.   RPR 1:128 2/23/24. Treated with Bicillin LA 2.4 million units x 1  RPR will need to be repeated.    Pt reports adherence to Ergocalciferol 50,000 units once weekly. Reviewed labs from 2/23/24 HIV VL <20, Vit D 17.6,  dils.  4/8/24 creatinine 1.65.  9/27/23 CD4 508.  Pt is followed by Dr. Cage for lymphadenopathy. He was last seen 4/8/24.  He attended eye 9/22/23 and is scheduled for a follow up 9/26/24. PCP is Brittany Snider. Pt works offshore. BMI 28.         Past Medical History:   Diagnosis Date    ADHD (attention deficit hyperactivity disorder)     Ages 5 through 13.  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     Mild sleep apnea     Pneumonia, unspecified organism     Syphilis, unspecified         Past Surgical History:   Procedure Laterality Date    BIOPSY      of abdomen        Social History     Socioeconomic History    Marital status: Single   Occupational History     Employer: [employer]   Tobacco Use    Smoking status: Never    Smokeless tobacco: Never   Substance and Sexual Activity    Alcohol use: No    Drug use:  Never    Sexual activity: Not Currently     Partners: Male     Birth control/protection: Abstinence     Social Determinants of Health     Financial Resource Strain: Low Risk  (1/9/2024)    Overall Financial Resource Strain (CARDIA)     Difficulty of Paying Living Expenses: Not hard at all   Food Insecurity: No Food Insecurity (1/9/2024)    Hunger Vital Sign     Worried About Running Out of Food in the Last Year: Never true     Ran Out of Food in the Last Year: Never true   Transportation Needs: No Transportation Needs (1/9/2024)    PRAPARE - Transportation     Lack of Transportation (Medical): No     Lack of Transportation (Non-Medical): No   Physical Activity: Insufficiently Active (1/9/2024)    Exercise Vital Sign     Days of Exercise per Week: 3 days     Minutes of Exercise per Session: 30 min   Stress: No Stress Concern Present (1/9/2024)    [employer] - Occupational Stress Questionnaire     Feeling of Stress : Not at all   Housing Stability: Low Risk  (1/9/2024)    Housing Stability Vital Sign     Unable to Pay for Housing in the Last Year: No     Number of Places Lived in the Last Year: 1     Unstable Housing in the Last Year: No        Family History   Problem Relation Name Age of Onset    Hypertension Mother      Migraines Father      Hypertension Father      Anxiety disorder Father      Diabetes Brother      Hypertension Brother          Review of patient's allergies indicates:   Allergen Reactions    Banana      Other reaction(s): Pharyngeal edema    Benzodiazepines Hallucinations        Immunization History   Administered Date(s) Administered    DTP 1989, 1989, 01/29/1990, 10/18/1990, 07/29/1993    HIB 10/18/1990    HPV 9-Valent 06/29/2023, 09/27/2023, 01/04/2024    Hepatitis A, Adult 05/26/2016    Hepatitis B 01/02/2001, 02/08/2001, 07/09/2001    Hepatitis B, Adult 03/28/2016    Hepatitis B, Pediatric/Adolescent 04/27/1995, 06/02/1995, 11/10/1995    Influenza -  "Quadrivalent 10/19/2016    Influenza - Quadrivalent - PF (6-35 months) 11/13/2017, 03/01/2019    Influenza - Quadrivalent - PF *Preferred* (6 months and older) 11/04/2019    Influenza - Trivalent - PF (ADULT) 11/13/2017    MMR 10/18/1990, 07/29/1993    Meningococcal Conjugate (MCV4P) 04/06/2011, 03/01/2019, 11/04/2019    OPV 1989, 1989, 10/18/1990, 07/29/1993    Pneumococcal Conjugate - 13 Valent 05/26/2016    Pneumococcal Polysaccharide - 23 Valent 08/02/2016, 10/26/2022    Td (ADULT) 01/02/2001    Tdap 08/01/2007, 08/02/2016        Review of Systems   Constitutional: Negative.    HENT: Negative.     Eyes: Negative.    Respiratory: Negative.     Cardiovascular: Negative.    Gastrointestinal: Negative.    Genitourinary: Negative.    Musculoskeletal: Negative.    Skin: Negative.    Neurological: Negative.    Endo/Heme/Allergies: Negative.    Psychiatric/Behavioral: Negative.     All other systems reviewed and are negative.         Objective:      /84   Pulse 65   Temp 97.8 °F (36.6 °C)   Resp 16   Ht 5' 6" (1.676 m)   Wt 81 kg (178 lb 9.2 oz)   BMI 28.82 kg/m²      Physical Exam  Vitals reviewed.   Constitutional:       General: He is not in acute distress.     Appearance: Normal appearance.   HENT:      Head: Normocephalic.      Right Ear: External ear normal.      Left Ear: External ear normal.      Nose: Nose normal.      Mouth/Throat:      Mouth: Mucous membranes are moist.      Pharynx: Oropharynx is clear.   Eyes:      General: No scleral icterus.     Extraocular Movements: Extraocular movements intact.      Conjunctiva/sclera: Conjunctivae normal.      Pupils: Pupils are equal, round, and reactive to light.   Cardiovascular:      Rate and Rhythm: Normal rate and regular rhythm.      Pulses: Normal pulses.      Heart sounds: Normal heart sounds.   Pulmonary:      Effort: Pulmonary effort is normal. No respiratory distress.      Breath sounds: Normal breath sounds.   Abdominal:      " General: Bowel sounds are normal. There is no distension.      Palpations: Abdomen is soft. There is no mass.      Tenderness: There is no abdominal tenderness. There is no right CVA tenderness or left CVA tenderness.      Hernia: No hernia is present.   Musculoskeletal:         General: No tenderness or signs of injury. Normal range of motion.      Cervical back: Normal range of motion and neck supple.      Right lower leg: No edema.      Left lower leg: No edema.   Lymphadenopathy:      Cervical: No cervical adenopathy.   Skin:     General: Skin is warm and dry.      Capillary Refill: Capillary refill takes less than 2 seconds.      Findings: No erythema or lesion.   Neurological:      General: No focal deficit present.      Mental Status: He is alert and oriented to person, place, and time. Mental status is at baseline.   Psychiatric:         Mood and Affect: Mood normal.         Behavior: Behavior normal.         Thought Content: Thought content normal.         Judgment: Judgment normal.          Labs:   Lab Results   Component Value Date    WBC 4.60 08/27/2024    HGB 15.1 08/27/2024    HCT 45.0 08/27/2024    MCV 90.4 08/27/2024     08/27/2024       CMP  Sodium   Date Value Ref Range Status   08/27/2024 136 136 - 145 mmol/L Final   03/14/2014 138 136 - 145 mmol/L Final     Potassium   Date Value Ref Range Status   08/27/2024 4.2 3.5 - 5.1 mmol/L Final   03/14/2014 4.7 3.5 - 5.1 mmol/L Final     Comment:     *Slightly Hemolyzed     Chloride   Date Value Ref Range Status   08/27/2024 105 98 - 107 mmol/L Final   03/14/2014 103 95 - 110 mmol/L Final     CO2   Date Value Ref Range Status   08/27/2024 26 22 - 29 mmol/L Final   03/14/2014 24 23 - 29 mmol/L Final     Glucose   Date Value Ref Range Status   03/14/2014 91 70 - 110 mg/dL Final     BUN   Date Value Ref Range Status   03/14/2014 11 6 - 20 mg/dL Final     Blood Urea Nitrogen   Date Value Ref Range Status   08/27/2024 10.6 8.9 - 20.6 mg/dL Final      Creatinine   Date Value Ref Range Status   08/27/2024 1.42 (H) 0.73 - 1.18 mg/dL Final   03/14/2014 1.2 0.5 - 1.4 mg/dL Final     Calcium   Date Value Ref Range Status   08/27/2024 9.9 8.4 - 10.2 mg/dL Final   03/14/2014 9.6 8.7 - 10.5 mg/dL Final     Total Protein   Date Value Ref Range Status   03/14/2014 8.4 6.0 - 8.4 g/dL Final     Albumin   Date Value Ref Range Status   08/27/2024 4.3 3.5 - 5.0 g/dL Final   03/14/2014 4.7 3.5 - 5.2 g/dL Final     Total Bilirubin   Date Value Ref Range Status   03/14/2014 1.5 (H) 0.1 - 1.0 mg/dL Final     Comment:     For infants and newborns, interpretation of results should be based  on gestational age, weight and in agreement with clinical  observations.  Premature Infant recommended reference ranges:  Up to 24 hours.............<8.0 mg/dL  Up to 48 hours............<12.0 mg/dL  3-5 days..................<15.0 mg/dL  6-29 days.................<15.0 mg/dL     Bilirubin Total   Date Value Ref Range Status   08/27/2024 0.7 <=1.5 mg/dL Final     Alkaline Phosphatase   Date Value Ref Range Status   03/14/2014 79 55 - 135 U/L Final     ALP   Date Value Ref Range Status   08/27/2024 83 40 - 150 unit/L Final     AST   Date Value Ref Range Status   08/27/2024 26 5 - 34 unit/L Final   03/14/2014 29 10 - 40 U/L Final     ALT   Date Value Ref Range Status   08/27/2024 19 0 - 55 unit/L Final   03/14/2014 17 10 - 44 U/L Final     Anion Gap   Date Value Ref Range Status   03/14/2014 11 8 - 16 mmol/L Final     eGFR   Date Value Ref Range Status   08/27/2024 >60 mL/min/1.73/m2 Final     Lab Results   Component Value Date    TSH 2.612 01/09/2024     Hep C Ab Interp   Date Value Ref Range Status   12/21/2022 Nonreactive Nonreactive Final     Syphilis Antibody   Date Value Ref Range Status   08/27/2024 Reactive (A) Nonreactive, Equivocal Final     RPR   Date Value Ref Range Status   08/27/2024 Reactive (A) Non-Reactive Final     RPR Titer   Date Value Ref Range Status   08/27/2024 128 dils  (A) (none) Final     Cholesterol Total   Date Value Ref Range Status   01/09/2024 165 <=200 mg/dL Final     Cholesterol   Date Value Ref Range Status   03/14/2014 118 (L) 120 - 199 mg/dL Final     Comment:     The National Cholesterol Education Program (NCEP) has set the  following guidelines (reference ranges) for Cholesterol:  Optimal.....................<200 mg/dL  Borderline High.............200-239 mg/dL  High........................> or = 240 mg/dL     HDL   Date Value Ref Range Status   03/14/2014 63 40 - 75 mg/dL Final     Comment:     The National Cholesterol Education Program (NCEP) has set the  following guidelines (reference values) for HDL Cholesterol:  Low...............<40 mg/dL  Optimal...........>60 mg/dL     HDL Cholesterol   Date Value Ref Range Status   01/09/2024 44 35 - 60 mg/dL Final     Triglycerides   Date Value Ref Range Status   03/14/2014 36 30 - 150 mg/dL Final     Comment:     The National Cholesterol Education Program (NCEP) has set the  following guidelines (reference values) for triglycerides:  Normal......................<150 mg/dL  Borderline High.............150-199 mg/dL  High........................200-499 mg/dL     Triglyceride   Date Value Ref Range Status   01/09/2024 89 34 - 140 mg/dL Final     Total Cholesterol/HDL Ratio   Date Value Ref Range Status   03/14/2014 1.9 (L) 2.0 - 5.0 Final     Cholesterol/HDL Ratio   Date Value Ref Range Status   01/09/2024 4 0 - 5 Final     Very Low Density Lipoprotein   Date Value Ref Range Status   01/09/2024 18  Final     LDL Cholesterol   Date Value Ref Range Status   01/09/2024 103.00 50.00 - 140.00 mg/dL Final     Vitamin D   Date Value Ref Range Status   02/23/2024 17.6 (L) 30.0 - 80.0 ng/mL Final     Results for orders placed or performed in visit on 09/27/23   CD4 Lymphocytes   Result Value Ref Range    Patient Age 33     WBC Absolute 3,910 (L) 4,500 - 11,500 /mm3    Lymph Percent 57.5 (H) 28 - 48 %    Lymph Absolute 2,248.25 1,260 -  5,520 x10(3)/mcL    CD4 % 22.6 %    CD4 Absolute 508 (L) 589 - 1,505 unit/L    T Cell Interp       Normal absolute lymphocyte count with decreased absolute CD4+ lymphocyte count.    Ken Gifford M.D.     Narrative    This test was developed and its performance characteristics determined by [facility]. It has not been cleared or approved by the US Food and Drug Administration. The FDA does not require this test to go through premarket FDA review. This test is used for clinical purposes. It should not be regarded as investigational or for research. This laboratory is certified under the Clinical Laboratory Improvement Amendments (CLIA) as qualified to perform high complexity clinical laboratory testing.     Results for orders placed or performed in visit on 02/23/24   HIV-1 RNA, Quantitative, PCR with Reflex to Genotype   Result Value Ref Range    HIV-1 RNA Detect/Quant, P <20 (A) Undetected copies/mL     Results for orders placed or performed in visit on 02/23/24   Quantiferon Gold TB   Result Value Ref Range    QuantiFERON-Tb Gold Plus Result Negative Negative    TB1 Ag minus Nil Result 0.06 IU/mL    TB2 Ag minus Nil Result 0.03 IU/mL    Mitogen minus Nil Result 9.82 IU/mL    Nil Result 0.18 IU/mL     Results for orders placed or performed in visit on 03/28/23   C.trach/N.gonor AMP RNA    Specimen: Throat   Result Value Ref Range    Chlamydia trachomatis amplified RNA Negative Negative    Neisseria gonorrhoeae amplified RNA Negative Negative    Source throat     SOURCE: throat      Results for orders placed or performed in visit on 12/21/22   Urinalysis   Result Value Ref Range    Color, UA Yellow Yellow, Light-Yellow, Dark Yellow, Nichol, Straw    Appearance, UA Clear Clear    Specific Gravity, UA 1.032     pH, UA 5.5 5.0 - 8.5    Protein, UA Trace (A) Negative mg/dL    Glucose, UA Normal Negative, Normal mg/dL    Ketones, UA Negative Negative mg/dL    Blood, UA Negative Negative  unit/L    Bilirubin, UA Negative Negative mg/dL    Urobilinogen, UA 1+ (A) 0.2, 1.0, Normal mg/dL    Nitrites, UA Negative Negative    Leukocyte Esterase, UA Negative Negative unit/L    WBC, UA 0-5 None Seen, 0-2, 3-5, 0-5 /HPF    Bacteria, UA None Seen None Seen /HPF    Squamous Epithelial Cells, UA Trace (A) None Seen /HPF    Mucous, UA Few (A) None Seen /LPF    Hyaline Casts, UA None Seen None Seen /lpf    RBC, UA 0-5 None Seen, 0-2, 3-5, 0-5 /HPF       Imaging: Reviewed most recent relevant imaging studies available, notable results highlighted in this note    Medications:     Current Outpatient Medications   Medication Instructions    ergocalciferol (VITAMIN D2) 50,000 Units, Oral, Every 7 days    TRIUMEQ 600- mg Tab 1 tablet, Oral, Daily       Assessment:       Problem List Items Addressed This Visit    None  Visit Diagnoses       AIDS (acquired immunodeficiency syndrome), CD4 >=500    -  Primary    Relevant Medications    TRIUMEQ 600- mg Tab    Other Relevant Orders    HIV-1 RNA, Quantitative, PCR with Reflex to Genotype    CD4 T-Wilton Cells    CBC Auto Differential    Comprehensive Metabolic Panel    Vitamin D deficiency        Relevant Medications    ergocalciferol (VITAMIN D2) 50,000 unit Cap    Renal insufficiency        History of syphilis        Routine screening for STI (sexually transmitted infection)        Relevant Orders    SYPHILIS ANTIBODY (WITH REFLEX RPR) (Completed)    Chlamydia/GC, PCR    Chlamydia/GC, PCR (Completed)    C.trach/N.gonor AMP RNA    BMI 28.0-28.9,adult                   Plan:      AIDS (acquired immunodeficiency syndrome), CD4 >=500  -     TRIUMEQ 600- mg Tab; Take 1 tablet by mouth once daily.  Dispense: 90 tablet; Refill: 1  -     HIV-1 RNA, Quantitative, PCR with Reflex to Genotype; Future; Expected date: 08/27/2024  -     CD4 T-Wilton Cells; Future; Expected date: 08/27/2024  -     CBC Auto Differential; Future; Expected date: 08/27/2024  -      "Comprehensive Metabolic Panel; Future; Expected date: 08/27/2024  Adherence and sexual health counseling done.  Use condoms for all sexual encounters.  Blood precautions.   Continue Triumeq 1 po q day as prescribed.  Labs today if HIV VL is not detected will consider switching to Cabenuva.  No available HIV GT so need to discuss further with Dr. Conrad.  RTC 4 months with Franca for an in office visit     HIV Wellness:  Oral CT/GC: 8/27/24  Anal CT/GC: 8/27/24  Urine CT/GC: 8/27/24  RPR: 8/27/24  Ophth: 9/22/23     Vitamin D deficiency  -     ergocalciferol (VITAMIN D2) 50,000 unit Cap; Take 1 capsule (50,000 Units total) by mouth every 7 days.  Dispense: 12 capsule; Refill: 1  Continue meds. Pt educated on the importance of Vit D to bone and organ health.     Renal insufficiency  Keep hydrated.  Avoid NSAIDs (ibuprofen, naproxen, aleve, advil, toradol or mobic)  Keep BP (goal <130/80) and cholesterol (LDL <100) within recommended goals  Low sodium diet encouraged; 2 grams per day  Increase exercise activity; 30 minutes 3-5 x per week   Maintain a healthy weight  Smoking cessation encouraged   Decrease ETOH intake/encouraged cessation   Medication compliance stressed   Keep all follow up appointments as scheduled     History of syphilis  Repeat RPR today     Routine screening for STI (sexually transmitted infection)  -     SYPHILIS ANTIBODY (WITH REFLEX RPR); Future; Expected date: 08/27/2024  -     Chlamydia/GC, PCR; Future; Expected date: 08/27/2024  -     Chlamydia/GC, PCR; Future; Expected date: 08/27/2024  -     C.trach/N.gonor AMP RNA; Future; Expected date: 08/27/2024  Oral, rectal, urine CT/GC collected     BMI 28.0-28.9,adult  Increase exercise activity to 30 minutes 3-5 x per week.  Avoid sugar sodas, simple sugars, sweets, excessive rice, pasta, potatoes or bread.  Choose "brown" options when available; practice portion control.  Limit fast foods and fried foods.  Encouraged to eat more portions of fresh " fruits and vegetables with lean meats daily (chicken, fish, etc).  Consider permanent healthy lifestyle changes.          30 minutes of total time spent on the encounter, which includes face to face time and non-face to face time preparing to see the patient (eg, review of tests), Obtaining and/or reviewing separately obtained history, Documenting clinical information in the electronic or other health record, Independently interpreting results (not separately reported) and communicating results to the patient/family/caregiver, or Care coordination (not separately reported).

## 2024-08-27 NOTE — TELEPHONE ENCOUNTER
Reviewed labs. RPR remains elevated at 1:128.     Hx of syphilis.   Treated with Bicillin LA 2.4 million units x 1 3/1/19.  RPR titer at that time was 1:128.   Treated again 3/30/23 with Bicillin LA 2.4 million units x 1. RPR remained elevated at 1:64 on 9/27/23.    Retreated 9/2023 with Doxycycline 100 mg 1 po BID x 28 days.   RPR 1:128 2/23/24. Treated with Bicillin LA 2.4 million units x 1    Pt will need to be treated with Bicillin LA 2.4 million units weekly x 3. I tried to contact patient. No answer. VM left for patient to contact me.

## 2024-08-28 ENCOUNTER — TELEPHONE (OUTPATIENT)
Dept: INFECTIOUS DISEASES | Facility: CLINIC | Age: 34
End: 2024-08-28
Payer: COMMERCIAL

## 2024-08-28 LAB
C TRACH RRNA SPEC QL NAA+PROBE: NEGATIVE
CD3 CELLS # BLD: 1376 CELLS/MCL (ref 550–2202)
CD3 CELLS NFR BLD: 68 % (ref 58–86)
CD3+CD4+ CELLS # BLD: 469 CELLS/MCL (ref 365–1437)
CD3+CD4+ CELLS NFR BLD: 23 % (ref 32–64)
CD3+CD4+ CELLS/CD3+CD8+ CLL BLD: 0.6 %
CD3+CD8+ CELLS # BLD: 805 CELLS/MCL (ref 171–846)
CD3+CD8+ CELLS NFR BLD: 40 % (ref 15–40)
CD45 CELLS # BLD: 2.02 THOU/MCL (ref 0.82–2.84)
HIV1 RNA # PLAS NAA DL=20: 43 COPIES/ML
N GONORRHOEA RRNA SPEC QL NAA+PROBE: NEGATIVE
SPECIMEN SOURCE: NORMAL
SPECIMEN SOURCE: NORMAL

## 2024-08-28 NOTE — TELEPHONE ENCOUNTER
----- Message from Jessa Lamb sent at 8/28/2024  7:49 AM CDT -----  Pt of Franca Dodge called in stating he is returning missed call.    Pt call back number  [phone]      Please advise

## 2024-08-28 NOTE — TELEPHONE ENCOUNTER
Patient informed of results and providers recommendations. He is out of town in [place] until the end of next month, he stated he was informed by lab on yesterday that he has multi insurance and may be billed, he is wanting to know what else can be done because he can't afford a bill for the injections/ visit.

## 2024-08-28 NOTE — TELEPHONE ENCOUNTER
Patient informed of results and providers recommendations. He is out of town in [place] until the end of next month, he stated he was informed by lab on yesterday that he has multi insurance and may be billed, he is wanting to know what else can be done because he can't afford a bill for the injections/ visit.

## 2024-09-03 DIAGNOSIS — B20 AIDS (ACQUIRED IMMUNODEFICIENCY SYNDROME), CD4 >=500: ICD-10-CM

## 2024-09-03 RX ORDER — ABACAVIR SULFATE, DOLUTEGRAVIR SODIUM, LAMIVUDINE 600; 50; 300 MG/1; MG/1; MG/1
1 TABLET, FILM COATED ORAL DAILY
Qty: 90 TABLET | Refills: 1 | OUTPATIENT
Start: 2024-09-03

## 2024-09-03 RX ORDER — ABACAVIR SULFATE, DOLUTEGRAVIR SODIUM, LAMIVUDINE 600; 50; 300 MG/1; MG/1; MG/1
1 TABLET, FILM COATED ORAL DAILY
Qty: 90 TABLET | Refills: 1 | Status: SHIPPED | OUTPATIENT
Start: 2024-09-03

## 2024-09-03 NOTE — TELEPHONE ENCOUNTER
----- Message from Jessa Lamb sent at 9/3/2024 12:33 PM CDT -----  Pt of Franca      Pt called in stating that he needs his prescription for TRIUMEQ sent to [facility]     due to his insurance being out of Network.     Pt also stated that his information can be forward to [facility].     Pt stated if you have any questions to send a message via portal.    Pt number [phone]

## 2024-09-03 NOTE — TELEPHONE ENCOUNTER
Pharmacy updated in system, Franca please send rx to this pharmacy. Medication proposed.      Also faxed results to [facility].   [phone]  Fax [phone]

## 2024-09-13 ENCOUNTER — TELEPHONE (OUTPATIENT)
Dept: INFECTIOUS DISEASES | Facility: CLINIC | Age: 34
End: 2024-09-13
Payer: COMMERCIAL

## 2024-09-13 NOTE — TELEPHONE ENCOUNTER
Pt returned call and stated he spoke with his  at his job who receives all PA denial/ approvals after they are completed, she will type up a letter stating why the medication isn't approved through their plan and email it to him, once he receives it he will email it to Deedee (B20 ) so she can assist him with applying for [place],  gave him the number to [employer] to see if their ins coverage is better than what he already has, also sent him this info in his portal.

## 2024-09-13 NOTE — TELEPHONE ENCOUNTER
----- Message from Anna Marie Rosenbaum sent at 9/13/2024  8:19 AM CDT -----  Patient of Franca Mark/Rhonda     Patient's mother Elza Ford, came to clinic requesting to speak with you regarding     patient's medication. Mother stated, patient can not afford medication and has been out for awhile     Mother would like to speak with you about applying for [place]. I informed mother , due to HIPAA     laws we could not speak with her without consent from patient.  She is requesting that you contact     the patient. She also stated they are currently waiting on PA to be completed for medication        Please contact patient  [phone]    Elza Reyes mother [phone]

## 2024-09-13 NOTE — TELEPHONE ENCOUNTER
Called Mary Vazquez (Nohemi/Francy) [phone] and was informed to contact plan sponsor [employer] [phone] - plan requires to go through medical due to not being covered under rx coverage.    Called [employer] (Jaclyn) who stated they assist with injections or infusions and this is an oral med,  she called Mary Vazquez (Liliana) on a conference call with me and informed her of this, Liliana stated that even though dx and tx was appropriate the medication was over $500 and isn't covered by their plan so PA was denied pt has to contact [facility] and ask why med isn't covered and request an appeal.      Sent pt a message in portal requesting a call back.

## 2024-09-25 DIAGNOSIS — R79.89 ELEVATED SERUM CREATININE: Primary | ICD-10-CM

## 2024-09-26 ENCOUNTER — TELEPHONE (OUTPATIENT)
Dept: INFECTIOUS DISEASES | Facility: CLINIC | Age: 34
End: 2024-09-26
Payer: COMMERCIAL

## 2024-09-26 NOTE — TELEPHONE ENCOUNTER
LM advising: I rec'd info provided through portal (Check stubs & updated DL for [email or web address]) and I have submitted it. I will keep him posted on the status.

## 2024-09-26 NOTE — TELEPHONE ENCOUNTER
----- Message from Jessa Lamb sent at 9/25/2024  1:42 PM CDT -----  Regarding: Deedee  Pt mini Kennedy    Pt requesting a call from Deedee.    Pt number  [phone]      Please advise

## 2024-10-02 ENCOUNTER — PATIENT MESSAGE (OUTPATIENT)
Dept: HEMATOLOGY/ONCOLOGY | Facility: CLINIC | Age: 34
End: 2024-10-02
Payer: COMMERCIAL

## 2024-10-07 ENCOUNTER — PATIENT MESSAGE (OUTPATIENT)
Dept: INFECTIOUS DISEASES | Facility: CLINIC | Age: 34
End: 2024-10-07
Payer: COMMERCIAL

## 2024-10-08 ENCOUNTER — TELEPHONE (OUTPATIENT)
Dept: INFECTIOUS DISEASES | Facility: CLINIC | Age: 34
End: 2024-10-08
Payer: COMMERCIAL

## 2024-10-08 NOTE — TELEPHONE ENCOUNTER
Tamra GUZMAN called to inform me that Azael's application has been denied due to his income ($125k, max allowed is < $80k). She recommended that we try a patient assistance program through the , [employer].     First, I tried to apply a copay card to his script here at [facility], which was denied because he has to use a specific pharmacy with his insurance. I then called [facility] in [place], [place], where he was last filling his meds and the current script was sent. They tried to apply the copay card and were denied as well.     I called [employer] and spoke to Peggy. I explained the situation to her and she then walked me through getting my enrolled in their financial assistance program. I submitted the application and now I am waiting on a response.

## 2024-10-15 ENCOUNTER — PATIENT MESSAGE (OUTPATIENT)
Dept: INFECTIOUS DISEASES | Facility: CLINIC | Age: 34
End: 2024-10-15
Payer: COMMERCIAL

## 2024-10-15 DIAGNOSIS — B20 AIDS (ACQUIRED IMMUNODEFICIENCY SYNDROME), CD4 >=500: ICD-10-CM

## 2024-10-15 RX ORDER — ABACAVIR SULFATE, DOLUTEGRAVIR SODIUM, LAMIVUDINE 600; 50; 300 MG/1; MG/1; MG/1
1 TABLET, FILM COATED ORAL DAILY
Qty: 90 TABLET | Refills: 1 | Status: SHIPPED | OUTPATIENT
Start: 2024-10-15

## 2024-10-15 NOTE — TELEPHONE ENCOUNTER
Patient needs a PRINTED SCRIPT for triumeq faxed to [facility] at: [phone]. Please send asap so his application can be reviewed.

## 2024-10-15 NOTE — LETTER
Fax Transmission                                                                                                                                                       Date: October 15, 2024       To:  [facility] From: Deedee PatelSAUL   Fax:  [phone] Fax: [phone]   Phone:  [phone] Phone: [phone]     Special Instructions:     Attached is the prescription requested for this patient's previously submitted application.     For questions or issues, please contact department listed on attached report.                            IF THERE ARE ANY PROBLEMS WITH THIS TRANSMISSION, PLEASE CALL IMMEDIATELY. THANK YOU    CONFIDENTIALITY NOTICE: The accompanying facsimile is intended solely for the use of the recipient designated above. Document(s) transmitted herewith may contain information that is confidential and privileged. Delivery, distribution of dissemination of this communication other than to the intended recipient is strictly prohibited. If you are another healthcare provider and have received this facsimile in error, please properly dispose and notify the sender. If you are NOT a healthcare provider and have received this facsimile in error, please notify [facility]s Compliance & Privacy Department immediately by email at [email or web address]

## 2024-10-18 NOTE — TELEPHONE ENCOUNTER
I see where my portal message was read 10/18/24 @ 08:18  I called [facility] and spoke to Pascual, to see if they have been communicating with Azael so that I can close this out, but unfortunately that have been unsuccessful in contacting him as well. [facility] did receive my written script that was faxed on 10/15/24, they are only in need of the patient's e-signature and his proof of income (check stubs), and Azael is aware of this. There is nothing more that can be done on our end to get him the assistance he needs to acquire his meds, it is dependent on the patient's response at this point.

## 2024-10-23 ENCOUNTER — TELEPHONE (OUTPATIENT)
Dept: INFECTIOUS DISEASES | Facility: CLINIC | Age: 34
End: 2024-10-23
Payer: COMMERCIAL

## 2024-10-23 NOTE — TELEPHONE ENCOUNTER
I received a denial letter from [employer] for Azael's Triumeq. I called them to appeal and I spoke to Abilio ([phone]). Again, I explained that the meds are not covered under his policy, and  the only reason that ANY med would be a excluded is if the cost is > $500, which the Triumeq is. He checked with his supervisor and deemed that this medication would be processed through [employer] and would ship out to the pt ASAP. The mailing address for the pt was given and the pt was notified of the coverage determination. He was very appreciative of all the efforts put forth in acquiring his medication.

## 2024-10-28 ENCOUNTER — TELEPHONE (OUTPATIENT)
Dept: INFECTIOUS DISEASES | Facility: CLINIC | Age: 34
End: 2024-10-28
Payer: COMMERCIAL

## 2024-11-01 ENCOUNTER — TELEPHONE (OUTPATIENT)
Dept: INFECTIOUS DISEASES | Facility: CLINIC | Age: 34
End: 2024-11-01
Payer: COMMERCIAL

## 2024-11-04 ENCOUNTER — TELEPHONE (OUTPATIENT)
Dept: INFECTIOUS DISEASES | Facility: CLINIC | Age: 34
End: 2024-11-04
Payer: COMMERCIAL

## 2024-11-04 NOTE — TELEPHONE ENCOUNTER
WILLIE: Azael as been accepted by [employer]'s Pt Assistance Program to receive Triumeq for the year, starting 11/2024.

## 2024-11-19 ENCOUNTER — TELEPHONE (OUTPATIENT)
Dept: INFECTIOUS DISEASES | Facility: CLINIC | Age: 34
End: 2024-11-19
Payer: COMMERCIAL

## 2024-11-19 NOTE — TELEPHONE ENCOUNTER
----- Message from Oksana sent at 11/19/2024 11:02 AM CST -----  Patient of Franca Mark    Patient is calling to discuss medication [facility].       11:04  Thanks,

## 2025-01-31 DIAGNOSIS — B20 AIDS (ACQUIRED IMMUNODEFICIENCY SYNDROME), CD4 >=500: ICD-10-CM

## 2025-01-31 RX ORDER — ABACAVIR SULFATE, DOLUTEGRAVIR SODIUM, LAMIVUDINE 600; 50; 300 MG/1; MG/1; MG/1
1 TABLET, FILM COATED ORAL DAILY
Qty: 30 TABLET | Refills: 0 | Status: SHIPPED | OUTPATIENT
Start: 2025-01-31

## 2025-01-31 NOTE — TELEPHONE ENCOUNTER
[facility] is requesting new rx for pt- med proposed (needs to be printed)    LV-08/27/24  NV-02/12/25  LL-08/27/24

## 2025-02-04 ENCOUNTER — TELEPHONE (OUTPATIENT)
Dept: INFECTIOUS DISEASES | Facility: CLINIC | Age: 35
End: 2025-02-04
Payer: COMMERCIAL

## 2025-02-04 DIAGNOSIS — B20 AIDS (ACQUIRED IMMUNODEFICIENCY SYNDROME), CD4 >=500: ICD-10-CM

## 2025-02-04 RX ORDER — ABACAVIR SULFATE, DOLUTEGRAVIR SODIUM, LAMIVUDINE 600; 50; 300 MG/1; MG/1; MG/1
1 TABLET, FILM COATED ORAL DAILY
Qty: 30 TABLET | Refills: 6 | Status: SHIPPED | OUTPATIENT
Start: 2025-02-04

## 2025-02-20 ENCOUNTER — LAB VISIT (OUTPATIENT)
Dept: LAB | Facility: HOSPITAL | Age: 35
End: 2025-02-20
Attending: NURSE PRACTITIONER
Payer: COMMERCIAL

## 2025-02-20 ENCOUNTER — OFFICE VISIT (OUTPATIENT)
Dept: INFECTIOUS DISEASES | Facility: CLINIC | Age: 35
End: 2025-02-20
Payer: COMMERCIAL

## 2025-02-20 ENCOUNTER — TELEPHONE (OUTPATIENT)
Dept: INFECTIOUS DISEASES | Facility: CLINIC | Age: 35
End: 2025-02-20

## 2025-02-20 VITALS
HEART RATE: 74 BPM | SYSTOLIC BLOOD PRESSURE: 130 MMHG | RESPIRATION RATE: 16 BRPM | DIASTOLIC BLOOD PRESSURE: 84 MMHG | TEMPERATURE: 98 F | BODY MASS INDEX: 27.44 KG/M2 | WEIGHT: 170.75 LBS | HEIGHT: 66 IN

## 2025-02-20 DIAGNOSIS — B20 HIV DISEASE: ICD-10-CM

## 2025-02-20 DIAGNOSIS — N28.9 RENAL INSUFFICIENCY: ICD-10-CM

## 2025-02-20 DIAGNOSIS — Z86.19 HISTORY OF SYPHILIS: ICD-10-CM

## 2025-02-20 DIAGNOSIS — E55.9 VITAMIN D DEFICIENCY: ICD-10-CM

## 2025-02-20 DIAGNOSIS — B20 HIV DISEASE: Primary | ICD-10-CM

## 2025-02-20 DIAGNOSIS — Z11.3 ROUTINE SCREENING FOR STI (SEXUALLY TRANSMITTED INFECTION): ICD-10-CM

## 2025-02-20 LAB
25(OH)D3+25(OH)D2 SERPL-MCNC: 17 NG/ML (ref 30–80)
ALBUMIN SERPL-MCNC: 4.3 G/DL (ref 3.5–5)
ALBUMIN/GLOB SERPL: 1 RATIO (ref 1.1–2)
ALP SERPL-CCNC: 78 UNIT/L (ref 40–150)
ALT SERPL-CCNC: 14 UNIT/L (ref 0–55)
ANION GAP SERPL CALC-SCNC: 6 MEQ/L
AST SERPL-CCNC: 24 UNIT/L (ref 5–34)
BACTERIA #/AREA URNS AUTO: ABNORMAL /HPF
BASOPHILS # BLD AUTO: 0.04 X10(3)/MCL
BASOPHILS NFR BLD AUTO: 0.9 %
BILIRUB SERPL-MCNC: 0.5 MG/DL
BILIRUB UR QL STRIP.AUTO: NEGATIVE
BUN SERPL-MCNC: 10.8 MG/DL (ref 8.9–20.6)
C TRACH RRNA UR QL NAA+PROBE: NOT DETECTED
CALCIUM SERPL-MCNC: 9.7 MG/DL (ref 8.4–10.2)
CHLORIDE SERPL-SCNC: 107 MMOL/L (ref 98–107)
CHOLEST SERPL-MCNC: 158 MG/DL
CHOLEST/HDLC SERPL: 4 {RATIO} (ref 0–5)
CLARITY UR: CLEAR
CO2 SERPL-SCNC: 26 MMOL/L (ref 22–29)
COLOR UR AUTO: ABNORMAL
CREAT SERPL-MCNC: 1.32 MG/DL (ref 0.72–1.25)
CREAT/UREA NIT SERPL: 8
EOSINOPHIL # BLD AUTO: 0.19 X10(3)/MCL (ref 0–0.9)
EOSINOPHIL NFR BLD AUTO: 4 %
ERYTHROCYTE [DISTWIDTH] IN BLOOD BY AUTOMATED COUNT: 13.5 % (ref 11.5–17)
EST. AVERAGE GLUCOSE BLD GHB EST-MCNC: 108.3 MG/DL
GFR SERPLBLD CREATININE-BSD FMLA CKD-EPI: >60 ML/MIN/1.73/M2
GLOBULIN SER-MCNC: 4.5 GM/DL (ref 2.4–3.5)
GLUCOSE SERPL-MCNC: 95 MG/DL (ref 74–100)
GLUCOSE UR QL STRIP: NORMAL
HAV IGM SERPL QL IA: NONREACTIVE
HBA1C MFR BLD: 5.4 %
HBV CORE IGM SERPL QL IA: NONREACTIVE
HBV SURFACE AG SERPL QL IA: NONREACTIVE
HCT VFR BLD AUTO: 46.4 % (ref 42–52)
HCV AB SERPL QL IA: NONREACTIVE
HDLC SERPL-MCNC: 44 MG/DL (ref 35–60)
HGB BLD-MCNC: 15.6 G/DL (ref 14–18)
HGB UR QL STRIP: NEGATIVE
HYALINE CASTS #/AREA URNS LPF: ABNORMAL /LPF
IMM GRANULOCYTES # BLD AUTO: 0.01 X10(3)/MCL (ref 0–0.04)
IMM GRANULOCYTES NFR BLD AUTO: 0.2 %
KETONES UR QL STRIP: NEGATIVE
LDLC SERPL CALC-MCNC: 101 MG/DL (ref 50–140)
LEUKOCYTE ESTERASE UR QL STRIP: NEGATIVE
LYMPHOCYTES # BLD AUTO: 2.33 X10(3)/MCL (ref 0.6–4.6)
LYMPHOCYTES NFR BLD AUTO: 49.6 %
MCH RBC QN AUTO: 30.1 PG (ref 27–31)
MCHC RBC AUTO-ENTMCNC: 33.6 G/DL (ref 33–36)
MCV RBC AUTO: 89.6 FL (ref 80–94)
MONOCYTES # BLD AUTO: 0.3 X10(3)/MCL (ref 0.1–1.3)
MONOCYTES NFR BLD AUTO: 6.4 %
MUCOUS THREADS URNS QL MICRO: ABNORMAL /LPF
N GONORRHOEA DNA UR QL NAA+PROBE: NOT DETECTED
NEUTROPHILS # BLD AUTO: 1.83 X10(3)/MCL (ref 2.1–9.2)
NEUTROPHILS NFR BLD AUTO: 38.9 %
NITRITE UR QL STRIP: NEGATIVE
NRBC BLD AUTO-RTO: 0 %
PH UR STRIP: 6 [PH]
PLATELET # BLD AUTO: 221 X10(3)/MCL (ref 130–400)
PMV BLD AUTO: 10.1 FL (ref 7.4–10.4)
POTASSIUM SERPL-SCNC: 4.4 MMOL/L (ref 3.5–5.1)
PROT SERPL-MCNC: 8.8 GM/DL (ref 6.4–8.3)
PROT UR QL STRIP: NEGATIVE
RBC # BLD AUTO: 5.18 X10(6)/MCL (ref 4.7–6.1)
RBC #/AREA URNS AUTO: ABNORMAL /HPF
RPR SER QL: REACTIVE
RPR SER-TITR: ABNORMAL {TITER}
SODIUM SERPL-SCNC: 139 MMOL/L (ref 136–145)
SP GR UR STRIP.AUTO: 1.02 (ref 1–1.03)
SQUAMOUS #/AREA URNS LPF: ABNORMAL /HPF
T PALLIDUM AB SER QL: REACTIVE
T VAGINALIS RRNA UR QL NAA+PROBE: NOT DETECTED
TRIGL SERPL-MCNC: 64 MG/DL (ref 34–140)
TSH SERPL-ACNC: 1.5 UIU/ML (ref 0.35–4.94)
UROBILINOGEN UR STRIP-ACNC: ABNORMAL
VLDLC SERPL CALC-MCNC: 13 MG/DL
WBC # BLD AUTO: 4.7 X10(3)/MCL (ref 4.5–11.5)
WBC #/AREA URNS AUTO: ABNORMAL /HPF

## 2025-02-20 PROCEDURE — 86361 T CELL ABSOLUTE COUNT: CPT

## 2025-02-20 PROCEDURE — 81001 URINALYSIS AUTO W/SCOPE: CPT | Performed by: NURSE PRACTITIONER

## 2025-02-20 PROCEDURE — 87661 TRICHOMONAS VAGINALIS AMPLIF: CPT | Performed by: NURSE PRACTITIONER

## 2025-02-20 PROCEDURE — 86480 TB TEST CELL IMMUN MEASURE: CPT

## 2025-02-20 PROCEDURE — 80061 LIPID PANEL: CPT

## 2025-02-20 PROCEDURE — 84443 ASSAY THYROID STIM HORMONE: CPT

## 2025-02-20 PROCEDURE — 36415 COLL VENOUS BLD VENIPUNCTURE: CPT

## 2025-02-20 PROCEDURE — 87536 HIV-1 QUANT&REVRSE TRNSCRPJ: CPT

## 2025-02-20 PROCEDURE — 86592 SYPHILIS TEST NON-TREP QUAL: CPT

## 2025-02-20 PROCEDURE — 99214 OFFICE O/P EST MOD 30 MIN: CPT | Mod: PBBFAC | Performed by: NURSE PRACTITIONER

## 2025-02-20 PROCEDURE — 80074 ACUTE HEPATITIS PANEL: CPT

## 2025-02-20 PROCEDURE — 83036 HEMOGLOBIN GLYCOSYLATED A1C: CPT

## 2025-02-20 PROCEDURE — 86780 TREPONEMA PALLIDUM: CPT

## 2025-02-20 PROCEDURE — 80053 COMPREHEN METABOLIC PANEL: CPT

## 2025-02-20 PROCEDURE — 82306 VITAMIN D 25 HYDROXY: CPT

## 2025-02-20 PROCEDURE — 85025 COMPLETE CBC W/AUTO DIFF WBC: CPT

## 2025-02-20 NOTE — PROGRESS NOTES
Patient ID: Azael Hodge 34 y.o.     Chief Complaint:   Chief Complaint   Patient presents with    Follow-up     B20        HPI:    Azael is a 33 y/o AAM here for advanced HIV f/u. MSM. Dx 3/7/16. HLA  negative. 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 Treated for disseminated MAC from 10/2021-11/2022. Pt reports adherence to Triumeq, tolerating meds well.  No past HIV GT on file. Triumeq is the only medication patient has ever taken. Discussed switching Triumeq to Biktarvy or an injectable. Pt is concerned, because his insurance does not cover his medication so he is currently getting it covered through [employer].  Explained to the patient that Triumeq is no longer a preferred or alternative regimen. Pt is interested in switching to an injectable, but wants to wait for the injectable that will be every six months. He would like to remain on the Triumeq despite being advised that it does carry some cardiac risks in some patients.  He denies fever, headache, chills, visual problems, N/V/D, SOB, cough, chest pain or abd pain.     Hx of syphilis.   Treated with Bicillin LA 2.4 million units x 1 3/1/19.  RPR titer at that time was 1:128.   Treated again 3/30/23 with Bicillin LA 2.4 million units x 1. RPR remained elevated at 1:64 on 9/27/23.    Retreated 9/2023 with Doxycycline 100 mg 1 po BID x 28 days.   RPR 1:128 2/23/24. Treated with Bicillin LA 2.4 million units x 1  RPR 1:128 on 8/27/24. Pt was treated with Bicillin LA 2.4 million units x 3 at the [facility] (09/18/24, 09/25/24, 10/02/24).    Pt reports adherence to Ergocalciferol 50,000 units once weekly. Reviewed labs from 8/27/24 HIV VL 43, CD4 469, creatinine 1.42, RPR 1:128.  Pt was followed by Dr. Cage for lymphadenopathy, but due to his insurance she referred to him to another provider.  However, patient has not attended. I explained to patient that it is very important that he find out who she referred him to and reach out to  schedule an appt. Pt last attended eye 9/22/23. He missed the appt scheduled for 9/26/24 due to work. Eye exam will need to be updated. PCP is Brittany Snider. 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 BMI 27. He is due for several vaccines, but is not amenable to any today.             Past Medical History:   Diagnosis Date    ADHD (attention deficit hyperactivity disorder)     Ages 5 through 13.  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     Mild sleep apnea     Pneumonia, unspecified organism     Syphilis, unspecified         Past Surgical History:   Procedure Laterality Date    BIOPSY      of abdomen        Social History     Socioeconomic History    Marital status: Single   Occupational History     Employer: [employer]   Tobacco Use    Smoking status: Never    Smokeless tobacco: Never   Substance and Sexual Activity    Alcohol use: No    Drug use: Never    Sexual activity: Not Currently     Partners: Male     Birth control/protection: Abstinence     Social Drivers of Health     Financial Resource Strain: Low Risk  (1/9/2024)    Overall Financial Resource Strain (CARDIA)     Difficulty of Paying Living Expenses: Not hard at all   Food Insecurity: No Food Insecurity (1/9/2024)    Hunger Vital Sign     Worried About Running Out of Food in the Last Year: Never true     Ran Out of Food in the Last Year: Never true   Transportation Needs: No Transportation Needs (1/9/2024)    PRAPARE - Transportation     Lack of Transportation (Medical): No     Lack of Transportation (Non-Medical): No   Physical Activity: Insufficiently Active (1/9/2024)    Exercise Vital Sign     Days of Exercise per Week: 3 days     Minutes of Exercise per Session: 30 min   Stress: No Stress Concern Present (1/9/2024)    [employer] - Occupational Stress Questionnaire     Feeling of Stress : Not at all   Housing Stability: Low Risk  (1/9/2024)    Housing Stability Vital Sign     Unable to Pay for Housing  in the Last Year: No     Number of Places Lived in the Last Year: 1     Unstable Housing in the Last Year: No        Family History   Problem Relation Name Age of Onset    Hypertension Mother      Migraines Father      Hypertension Father      Anxiety disorder Father      Diabetes Brother      Hypertension Brother          Review of patient's allergies indicates:   Allergen Reactions    Banana      Other reaction(s): Pharyngeal edema    Benzodiazepines Hallucinations        Immunization History   Administered Date(s) Administered    DTP 1989, 1989, 01/29/1990, 10/18/1990, 07/29/1993    HIB 10/18/1990    HPV 9-Valent 06/29/2023, 09/27/2023, 01/04/2024    Hepatitis A, Adult 05/26/2016    Hepatitis B 01/02/2001, 02/08/2001, 07/09/2001    Hepatitis B, Adult 03/28/2016    Hepatitis B, Pediatric/Adolescent 04/27/1995, 06/02/1995, 11/10/1995    Influenza - Quadrivalent 10/19/2016    Influenza - Quadrivalent - PF (6-35 months) 11/13/2017, 03/01/2019, 11/04/2019    Influenza - Quadrivalent - PF *Preferred* (6 months and older) 11/04/2019    Influenza - Trivalent - Fluarix, Flulaval, Fluzone, Afluria - PF 10/19/2016, 11/13/2017    MMR 10/18/1990, 07/29/1993    Meningococcal Conjugate (MCV4P) 04/06/2011, 03/01/2019, 11/04/2019    OPV 1989, 1989, 10/18/1990, 07/29/1993    Pneumococcal Conjugate - 13 Valent 05/26/2016    Pneumococcal Polysaccharide - 23 Valent 08/02/2016, 10/26/2022    Td (ADULT) 01/02/2001    Tdap 08/01/2007, 08/02/2016        Review of Systems   Constitutional: Negative.    HENT: Negative.     Eyes: Negative.    Respiratory: Negative.     Cardiovascular: Negative.    Gastrointestinal: Negative.    Genitourinary: Negative.    Musculoskeletal: Negative.    Skin: Negative.    Neurological: Negative.    Endo/Heme/Allergies: Negative.    Psychiatric/Behavioral: Negative.     All other systems reviewed and are negative.         Objective:      /84   Pulse 74   Temp 98.4 °F (36.9 °C)  "(Oral)   Resp 16   Ht 5' 6" (1.676 m)   Wt 77.4 kg (170 lb 11.9 oz)   BMI 27.56 kg/m²      Physical Exam  Vitals reviewed.   Constitutional:       General: He is not in acute distress.     Appearance: Normal appearance. He is obese.   HENT:      Head: Normocephalic.      Right Ear: External ear normal.      Left Ear: External ear normal.      Nose: Nose normal.      Mouth/Throat:      Mouth: Mucous membranes are moist.      Pharynx: Oropharynx is clear.   Eyes:      General: No scleral icterus.     Extraocular Movements: Extraocular movements intact.      Conjunctiva/sclera: Conjunctivae normal.      Pupils: Pupils are equal, round, and reactive to light.   Cardiovascular:      Rate and Rhythm: Normal rate and regular rhythm.      Pulses: Normal pulses.      Heart sounds: Normal heart sounds.   Pulmonary:      Effort: Pulmonary effort is normal. No respiratory distress.      Breath sounds: Normal breath sounds.   Abdominal:      General: Bowel sounds are normal. There is no distension.      Palpations: Abdomen is soft. There is no mass.      Tenderness: There is no abdominal tenderness. There is no right CVA tenderness or left CVA tenderness.      Hernia: No hernia is present.   Musculoskeletal:         General: No tenderness or signs of injury. Normal range of motion.      Cervical back: Normal range of motion and neck supple.      Right lower leg: No edema.      Left lower leg: No edema.   Lymphadenopathy:      Cervical: No cervical adenopathy.   Skin:     General: Skin is warm and dry.      Capillary Refill: Capillary refill takes less than 2 seconds.      Findings: No erythema or lesion.   Neurological:      General: No focal deficit present.      Mental Status: He is alert and oriented to person, place, and time. Mental status is at baseline.   Psychiatric:         Mood and Affect: Mood normal.         Behavior: Behavior normal.         Thought Content: Thought content normal.         Judgment: Judgment " normal.          Labs:   Lab Results   Component Value Date    WBC 4.60 08/27/2024    HGB 15.1 08/27/2024    HCT 45.0 08/27/2024    MCV 90.4 08/27/2024     08/27/2024       CMP  Sodium   Date Value Ref Range Status   08/27/2024 136 136 - 145 mmol/L Final   03/14/2014 138 136 - 145 mmol/L Final     Potassium   Date Value Ref Range Status   08/27/2024 4.2 3.5 - 5.1 mmol/L Final   03/14/2014 4.7 3.5 - 5.1 mmol/L Final     Comment:     *Slightly Hemolyzed     Chloride   Date Value Ref Range Status   08/27/2024 105 98 - 107 mmol/L Final   03/14/2014 103 95 - 110 mmol/L Final     CO2   Date Value Ref Range Status   08/27/2024 26 22 - 29 mmol/L Final   03/14/2014 24 23 - 29 mmol/L Final     Glucose   Date Value Ref Range Status   03/14/2014 91 70 - 110 mg/dL Final     BUN   Date Value Ref Range Status   03/14/2014 11 6 - 20 mg/dL Final     Blood Urea Nitrogen   Date Value Ref Range Status   08/27/2024 10.6 8.9 - 20.6 mg/dL Final     Creatinine   Date Value Ref Range Status   08/27/2024 1.42 (H) 0.73 - 1.18 mg/dL Final   03/14/2014 1.2 0.5 - 1.4 mg/dL Final     Calcium   Date Value Ref Range Status   08/27/2024 9.9 8.4 - 10.2 mg/dL Final   03/14/2014 9.6 8.7 - 10.5 mg/dL Final     Total Protein   Date Value Ref Range Status   03/14/2014 8.4 6.0 - 8.4 g/dL Final     Albumin   Date Value Ref Range Status   08/27/2024 4.3 3.5 - 5.0 g/dL Final   03/14/2014 4.7 3.5 - 5.2 g/dL Final     Total Bilirubin   Date Value Ref Range Status   03/14/2014 1.5 (H) 0.1 - 1.0 mg/dL Final     Comment:     For infants and newborns, interpretation of results should be based  on gestational age, weight and in agreement with clinical  observations.  Premature Infant recommended reference ranges:  Up to 24 hours.............<8.0 mg/dL  Up to 48 hours............<12.0 mg/dL  3-5 days..................<15.0 mg/dL  6-29 days.................<15.0 mg/dL     Bilirubin Total   Date Value Ref Range Status   08/27/2024 0.7 <=1.5 mg/dL Final      Alkaline Phosphatase   Date Value Ref Range Status   03/14/2014 79 55 - 135 U/L Final     ALP   Date Value Ref Range Status   08/27/2024 83 40 - 150 unit/L Final     AST   Date Value Ref Range Status   08/27/2024 26 5 - 34 unit/L Final   03/14/2014 29 10 - 40 U/L Final     ALT   Date Value Ref Range Status   08/27/2024 19 0 - 55 unit/L Final   03/14/2014 17 10 - 44 U/L Final     Anion Gap   Date Value Ref Range Status   03/14/2014 11 8 - 16 mmol/L Final     eGFR   Date Value Ref Range Status   08/27/2024 >60 mL/min/1.73/m2 Final     Lab Results   Component Value Date    TSH 2.612 01/09/2024     Hep C Ab Interp   Date Value Ref Range Status   12/21/2022 Nonreactive Nonreactive Final     Syphilis Antibody   Date Value Ref Range Status   08/27/2024 Reactive (A) Nonreactive, Equivocal Final     RPR   Date Value Ref Range Status   08/27/2024 Reactive (A) Non-Reactive Final     RPR Titer   Date Value Ref Range Status   08/27/2024 128 dils (A) (none) Final     Cholesterol Total   Date Value Ref Range Status   01/09/2024 165 <=200 mg/dL Final     Cholesterol   Date Value Ref Range Status   03/14/2014 118 (L) 120 - 199 mg/dL Final     Comment:     The National Cholesterol Education Program (NCEP) has set the  following guidelines (reference ranges) for Cholesterol:  Optimal.....................<200 mg/dL  Borderline High.............200-239 mg/dL  High........................> or = 240 mg/dL     HDL   Date Value Ref Range Status   03/14/2014 63 40 - 75 mg/dL Final     Comment:     The National Cholesterol Education Program (NCEP) has set the  following guidelines (reference values) for HDL Cholesterol:  Low...............<40 mg/dL  Optimal...........>60 mg/dL     HDL Cholesterol   Date Value Ref Range Status   01/09/2024 44 35 - 60 mg/dL Final     Triglycerides   Date Value Ref Range Status   03/14/2014 36 30 - 150 mg/dL Final     Comment:     The National Cholesterol Education Program (NCEP) has set the  following  guidelines (reference values) for triglycerides:  Normal......................<150 mg/dL  Borderline High.............150-199 mg/dL  High........................200-499 mg/dL     Triglyceride   Date Value Ref Range Status   01/09/2024 89 34 - 140 mg/dL Final     Total Cholesterol/HDL Ratio   Date Value Ref Range Status   03/14/2014 1.9 (L) 2.0 - 5.0 Final     Cholesterol/HDL Ratio   Date Value Ref Range Status   01/09/2024 4 0 - 5 Final     Very Low Density Lipoprotein   Date Value Ref Range Status   01/09/2024 18  Final     LDL Cholesterol   Date Value Ref Range Status   01/09/2024 103.00 50.00 - 140.00 mg/dL Final     Vitamin D   Date Value Ref Range Status   02/23/2024 17.6 (L) 30.0 - 80.0 ng/mL Final     Results for orders placed or performed in visit on 09/27/23   CD4 Lymphocytes   Result Value Ref Range    Patient Age 33     WBC Absolute 3,910 (L) 4,500 - 11,500 /mm3    Lymph Percent 57.5 (H) 28 - 48 %    Lymph Absolute 2,248.25 1,260 - 5,520 x10(3)/mcL    CD4 % 22.6 %    CD4 Absolute 508 (L) 589 - 1,505 unit/L    T Cell Interp       Normal absolute lymphocyte count with decreased absolute CD4+ lymphocyte count.    Ken Gifford M.D.     Narrative    This test was developed and its performance characteristics determined by [facility]. It has not been cleared or approved by the US Food and Drug Administration. The FDA does not require this test to go through premarket FDA review. This test is used for clinical purposes. It should not be regarded as investigational or for research. This laboratory is certified under the Clinical Laboratory Improvement Amendments (CLIA) as qualified to perform high complexity clinical laboratory testing.     Results for orders placed or performed in visit on 08/27/24   HIV-1 RNA, Quantitative, PCR with Reflex to Genotype   Result Value Ref Range    HIV-1 RNA Detect/Quant, P 43 (A) Undetected copies/mL     Results for orders placed or performed  in visit on 02/23/24   Quantiferon Gold TB   Result Value Ref Range    QuantiFERON-Tb Gold Plus Result Negative Negative    TB1 Ag minus Nil Result 0.06 IU/mL    TB2 Ag minus Nil Result 0.03 IU/mL    Mitogen minus Nil Result 9.82 IU/mL    Nil Result 0.18 IU/mL     Results for orders placed or performed in visit on 08/27/24   C.trach/N.gonor AMP RNA    Specimen: Throat   Result Value Ref Range    Source THROAT     Chlamydia trachomatis amplified RNA Negative Negative    Neisseria gonorrhoeae amplified RNA Negative Negative    SOURCE: THROAT      Results for orders placed or performed in visit on 12/21/22   Urinalysis   Result Value Ref Range    Color, UA Yellow Yellow, Light-Yellow, Dark Yellow, Nichol, Straw    Appearance, UA Clear Clear    Specific Gravity, UA 1.032     pH, UA 5.5 5.0 - 8.5    Protein, UA Trace (A) Negative mg/dL    Glucose, UA Normal Negative, Normal mg/dL    Ketones, UA Negative Negative mg/dL    Blood, UA Negative Negative unit/L    Bilirubin, UA Negative Negative mg/dL    Urobilinogen, UA 1+ (A) 0.2, 1.0, Normal mg/dL    Nitrites, UA Negative Negative    Leukocyte Esterase, UA Negative Negative unit/L    WBC, UA 0-5 None Seen, 0-2, 3-5, 0-5 /HPF    Bacteria, UA None Seen None Seen /HPF    Squamous Epithelial Cells, UA Trace (A) None Seen /HPF    Mucous, UA Few (A) None Seen /LPF    Hyaline Casts, UA None Seen None Seen /lpf    RBC, UA 0-5 None Seen, 0-2, 3-5, 0-5 /HPF       Imaging: Reviewed most recent relevant imaging studies available, notable results highlighted in this note    Medications:     Current Outpatient Medications   Medication Instructions    TRIUMEQ 600- mg Tab 1 tablet, Oral, Daily       Assessment:       Problem List Items Addressed This Visit    None  Visit Diagnoses         HIV disease    -  Primary    Relevant Orders    HIV-1 RNA, Quantitative, PCR with Reflex to Genotype    TSH    CBC Auto Differential    Comprehensive Metabolic Panel    Lipid Panel    Quantiferon  Gold TB    CD4 Lymphocytes    Ambulatory referral/consult to Ophthalmology      Vitamin D deficiency        Relevant Orders    TSH    Vitamin D      Renal insufficiency          History of syphilis          BMI 27.0-27.9,adult        Relevant Orders    Hemoglobin A1C      Routine screening for STI (sexually transmitted infection)        Relevant Orders    Hepatitis Panel, Acute    SYPHILIS ANTIBODY (WITH REFLEX RPR)    Sexually-Transmitted Infections (STIs) Increased Risk Panel    Urinalysis, Reflex to Urine Culture               Plan:      HIV disease  -     HIV-1 RNA, Quantitative, PCR with Reflex to Genotype; Future; Expected date: 02/20/2025  -     TSH; Future; Expected date: 02/20/2025  -     CBC Auto Differential; Future; Expected date: 02/20/2025  -     Comprehensive Metabolic Panel; Future; Expected date: 02/20/2025  -     Lipid Panel; Future; Expected date: 02/20/2025  -     Quantiferon Gold TB; Future; Expected date: 02/20/2025  -     CD4 Lymphocytes; Future; Expected date: 02/20/2025  -     Ambulatory referral/consult to Ophthalmology; Future; Expected date: 02/27/2025  Dx 3/17/16  Initial HIV VL and CD4 misty  OI hx: PJP and disseminated MAC    Adherence and sexual health counseling done.  Use condoms for all sexual encounters.  Blood precautions.   Continue Triumeq as prescribed.  Labs today.  RTC 4 months with Franca for an in office visit     HIV Wellness:  Anal pap: deferred to next visit   Oral CT/GC: 8/27/24  Anal CT/GC: 8/27/24  Urine CT/GC: 8/27/24  RPR: 8/27/24  Ophth: 9/22/23  Vaccines recommended     Vitamin D deficiency  -     TSH; Future; Expected date: 02/20/2025  -     Vitamin D; Future; Expected date: 02/20/2025  Continue meds. Pt educated on the importance of Vit D to bone and organ health.     Renal insufficiency  Keep hydrated.  Avoid NSAIDs (ibuprofen, naproxen, aleve, advil, toradol or mobic)  Keep BP (goal <130/80) and cholesterol (LDL <100) within recommended goals  Low sodium diet  "encouraged; 2 grams per day  Increase exercise activity; 30 minutes 3-5 x per week   Maintain a healthy weight  Smoking cessation encouraged   Decrease ETOH intake/encouraged cessation   Medication compliance stressed   Keep all follow up appointments as scheduled     History of syphilis  Treated with Bicillin LA 2.4 million units x 1 3/1/19.  RPR titer at that time was 1:128.   Treated again 3/30/23 with Bicillin LA 2.4 million units x 1. RPR remained elevated at 1:64 on 9/27/23.    Retreated 9/2023 with Doxycycline 100 mg 1 po BID x 28 days.   RPR 1:128 2/23/24. Treated with Bicillin LA 2.4 million units x 1  RPR 1:128 on 8/27/24. Pt was treated with Bicillin LA 2.4 million units x 3 [facility]. He recd the 1st dose 8/27/24.  I will request records.      BMI 27.0-27.9,adult  -     Hemoglobin A1C; Future; Expected date: 02/20/2025  Increase exercise activity to 30 minutes 3-5 x per week.  Avoid sugar sodas, simple sugars, sweets, excessive rice, pasta, potatoes or bread.  Choose "brown" options when available; practice portion control.  Limit fast foods and fried foods.  Encouraged to eat more portions of fresh fruits and vegetables with lean meats daily (chicken, fish, etc).  Consider permanent healthy lifestyle changes.     Routine screening for STI (sexually transmitted infection)  -     Hepatitis Panel, Acute; Future; Expected date: 02/20/2025  -     SYPHILIS ANTIBODY (WITH REFLEX RPR); Future; Expected date: 02/20/2025  -     Sexually-Transmitted Infections (STIs) Increased Risk Panel; Future; Expected date: 02/20/2025  -     Urinalysis, Reflex to Urine Culture; Future; Expected date: 02/20/2025     47 minutes of total time spent on the encounter, which includes face to face time and non-face to face time preparing to see the patient (eg, review of tests), Obtaining and/or reviewing separately obtained history, Documenting clinical information in the electronic or other health record, " Independently interpreting results (not separately reported) and communicating results to the patient/family/caregiver, or Care coordination (not separately reported).

## 2025-02-20 NOTE — TELEPHONE ENCOUNTER
Just double checking. 1st and 2nd dose have the same date. Do you have a different date for one of them?

## 2025-02-20 NOTE — TELEPHONE ENCOUNTER
Called [facility] unit [phone] and spoke with Carmen, she stated pt had an RPR of 1:32 on 09/18/24 and received Bicillin 2.4 million units #1 then  09/18/24 #2   10/02/24 #3  I will fax her the results from today once it's resulted.

## 2025-02-21 LAB
AGE: 35
CD3+CD4+ CELLS # SPEC: 364 UNIT/L (ref 589–1505)
CD3+CD4+ CELLS NFR BLD: 15.5 %
LYMPHOCYTES # BLD AUTO: 2350 X10(3)/MCL (ref 1260–5520)
LYMPHOCYTES NFR LN MANUAL: 50 % (ref 28–48)
LYMPHOMA - T-CELL MARKERS SPEC-IMP: ABNORMAL
WBC # BLD AUTO: 4700 /MM3 (ref 4500–11500)

## 2025-02-22 LAB — HIV1 RNA # PLAS NAA DL=20: 115 COPIES/ML

## 2025-02-24 LAB
GAMMA INTERFERON BACKGROUND BLD IA-ACNC: 0.3 IU/ML
M TB IFN-G BLD-IMP: NEGATIVE
M TB IFN-G CD4+ BCKGRND COR BLD-ACNC: -0.13 IU/ML
M TB IFN-G CD4+CD8+ BCKGRND COR BLD-ACNC: -0.05 IU/ML
MITOGEN IGNF BCKGRD COR BLD-ACNC: 7.67 IU/ML

## 2025-07-03 ENCOUNTER — OFFICE VISIT (OUTPATIENT)
Dept: INFECTIOUS DISEASES | Facility: CLINIC | Age: 35
End: 2025-07-03
Payer: COMMERCIAL

## 2025-07-03 VITALS
HEIGHT: 66 IN | DIASTOLIC BLOOD PRESSURE: 81 MMHG | BODY MASS INDEX: 27.58 KG/M2 | SYSTOLIC BLOOD PRESSURE: 137 MMHG | HEART RATE: 61 BPM | TEMPERATURE: 98 F | WEIGHT: 171.63 LBS | RESPIRATION RATE: 16 BRPM

## 2025-07-03 DIAGNOSIS — N28.9 RENAL INSUFFICIENCY: ICD-10-CM

## 2025-07-03 DIAGNOSIS — E55.9 VITAMIN D DEFICIENCY: ICD-10-CM

## 2025-07-03 DIAGNOSIS — Z11.3 ROUTINE SCREENING FOR STI (SEXUALLY TRANSMITTED INFECTION): ICD-10-CM

## 2025-07-03 DIAGNOSIS — Z86.19 HISTORY OF SYPHILIS: ICD-10-CM

## 2025-07-03 DIAGNOSIS — B20 HIV DISEASE: Primary | ICD-10-CM

## 2025-07-03 DIAGNOSIS — Z12.9 CANCER SCREENING: ICD-10-CM

## 2025-07-03 LAB
C TRACH DNA SPEC QL NAA+PROBE: NOT DETECTED
N GONORRHOEA DNA SPEC QL NAA+PROBE: NOT DETECTED
SPECIMEN SOURCE: NORMAL

## 2025-07-03 PROCEDURE — 87591 N.GONORRHOEAE DNA AMP PROB: CPT | Mod: 59 | Performed by: NURSE PRACTITIONER

## 2025-07-03 PROCEDURE — 99214 OFFICE O/P EST MOD 30 MIN: CPT | Mod: PBBFAC | Performed by: NURSE PRACTITIONER

## 2025-07-03 PROCEDURE — 87491 CHLMYD TRACH DNA AMP PROBE: CPT | Performed by: NURSE PRACTITIONER

## 2025-07-03 RX ORDER — BICTEGRAVIR SODIUM, EMTRICITABINE, AND TENOFOVIR ALAFENAMIDE FUMARATE 50; 200; 25 MG/1; MG/1; MG/1
1 TABLET ORAL DAILY
Qty: 90 TABLET | Refills: 0 | Status: SHIPPED | OUTPATIENT
Start: 2025-07-03 | End: 2025-07-03 | Stop reason: ALTCHOICE

## 2025-07-03 RX ORDER — ERGOCALCIFEROL 1.25 MG/1
50000 CAPSULE ORAL
Qty: 12 CAPSULE | Refills: 1 | Status: SHIPPED | OUTPATIENT
Start: 2025-07-03

## 2025-07-03 RX ORDER — ABACAVIR SULFATE, DOLUTEGRAVIR SODIUM, LAMIVUDINE 600; 50; 300 MG/1; MG/1; MG/1
1 TABLET, FILM COATED ORAL DAILY
Qty: 90 TABLET | Refills: 1 | Status: SHIPPED | OUTPATIENT
Start: 2025-07-03

## 2025-07-03 NOTE — PROGRESS NOTES
Patient ID: Azael Hodge 34 y.o.     Chief Complaint:   Chief Complaint   Patient presents with    Follow-up     B20        HPI:    Azael is a 33 y/o AAM here for advanced HIV f/u. MSM. Dx 3/7/16. HLA  negative. 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 Treated for disseminated MAC from 10/2021-11/2022. Reports adherence to Triumeq, tolerating meds well.  No past HIV GT on file. Triumeq is the only medication patient has ever taken. Discussed switching Triumeq to Biktarvy or an injectable. Pt is concerned, because his insurance does not cover his medication so he is currently getting it covered through [employer]. 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  At this time, he would like to continue Triumeq  and switch to an injectable after his next visit. Pt wants to get a few kinks worked out. He denies fever, headache, chills, visual problems, N/V/D, SOB, cough, chest pain or abd pain.      Hx of syphilis.   Treated with Bicillin LA 2.4 million units x 1 3/1/19.  RPR titer at that time was 1:128.   Treated again 3/30/23 with Bicillin LA 2.4 million units x 1. RPR remained elevated at 1:64 on 9/27/23.    Retreated 9/2023 with Doxycycline 100 mg 1 po BID x 28 days.   RPR 1:128 2/23/24. Treated with Bicillin LA 2.4 million units x 1  RPR 1:128 on 8/27/24. Pt was treated with Bicillin LA 2.4 million units x 3 at the [facility] (09/18/24, 09/25/24, 10/02/24).     Pt reports non-adherence to Ergocalciferol 50,000 units once weekly. I will restart.  Reviewed labs from 2/20/25 HIV , Cd4 364, Vit D 17, creatinine 1.32, RPR 2 dils.  Labs and STI screening along with anal pap smear will need to be updated today. Eye appt is scheduled for 7/14/25. PCP is Brittany Snider. BMI 27.          Past Medical History:   Diagnosis Date    ADHD (attention deficit hyperactivity disorder)     Ages 5 through 13.  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     Mild sleep apnea     Pneumonia, unspecified organism     Syphilis, unspecified         Past Surgical History:   Procedure Laterality Date    BIOPSY      of abdomen        Social History     Socioeconomic History    Marital status: Single   Occupational History     Employer: [employer]   Tobacco Use    Smoking status: Never    Smokeless tobacco: Never   Substance and Sexual Activity    Alcohol use: No    Drug use: Never    Sexual activity: Not Currently     Partners: Male     Birth control/protection: Abstinence     Social Drivers of Health     Financial Resource Strain: Low Risk  (1/9/2024)    Overall Financial Resource Strain (CARDIA)     Difficulty of Paying Living Expenses: Not hard at all   Food Insecurity: No Food Insecurity (1/9/2024)    Hunger Vital Sign     Worried About Running Out of Food in the Last Year: Never true     Ran Out of Food in the Last Year: Never true   Transportation Needs: No Transportation Needs (1/9/2024)    PRAPARE - Transportation     Lack of Transportation (Medical): No     Lack of Transportation (Non-Medical): No   Physical Activity: Insufficiently Active (1/9/2024)    Exercise Vital Sign     Days of Exercise per Week: 3 days     Minutes of Exercise per Session: 30 min   Stress: No Stress Concern Present (1/9/2024)    [employer] - Occupational Stress Questionnaire     Feeling of Stress : Not at all   Housing Stability: Low Risk  (1/9/2024)    Housing Stability Vital Sign     Unable to Pay for Housing in the Last Year: No     Number of Places Lived in the Last Year: 1     Unstable Housing in the Last Year: No        Family History   Problem Relation Name Age of Onset    Hypertension Mother      Migraines Father      Hypertension Father      Anxiety disorder Father      Diabetes Brother      Hypertension Brother          Review of patient's allergies indicates:   Allergen Reactions    Banana      Other reaction(s): Pharyngeal edema    Benzodiazepines  "Hallucinations        Immunization History   Administered Date(s) Administered    DTP 1989, 1989, 01/29/1990, 10/18/1990, 07/29/1993    HIB 10/18/1990    HPV 9-Valent 06/29/2023, 09/27/2023, 01/04/2024    Hepatitis A, Adult 05/26/2016    Hepatitis B 01/02/2001, 02/08/2001, 07/09/2001    Hepatitis B, Adult 03/28/2016    Hepatitis B, Pediatric/Adolescent 04/27/1995, 06/02/1995, 11/10/1995    Influenza - Quadrivalent 10/19/2016    Influenza - Quadrivalent - PF (6-35 months) 11/13/2017, 03/01/2019, 11/04/2019    Influenza - Quadrivalent - PF *Preferred* (6 months and older) 11/04/2019    Influenza - Trivalent - Fluarix, Flulaval, Fluzone, Afluria - PF 10/19/2016, 11/13/2017    MMR 10/18/1990, 07/29/1993    Meningococcal Conjugate (MCV4P) 04/06/2011, 03/01/2019, 11/04/2019    OPV 1989, 1989, 10/18/1990, 07/29/1993    Pneumococcal Conjugate - 13 Valent 05/26/2016    Pneumococcal Polysaccharide - 23 Valent 08/02/2016, 10/26/2022    Td (ADULT) 01/02/2001    Tdap 08/01/2007, 08/02/2016        Review of Systems   Constitutional: Negative.    HENT: Negative.     Eyes: Negative.    Respiratory: Negative.     Cardiovascular: Negative.    Gastrointestinal: Negative.    Genitourinary: Negative.    Musculoskeletal: Negative.    Skin: Negative.    Neurological: Negative.    Endo/Heme/Allergies: Negative.    Psychiatric/Behavioral: Negative.     All other systems reviewed and are negative.         Objective:      /81   Pulse 61   Temp 97.6 °F (36.4 °C) (Oral)   Resp 16   Ht 5' 6" (1.676 m)   Wt 77.9 kg (171 lb 10.1 oz)   BMI 27.70 kg/m²      Physical Exam  Vitals reviewed.   Constitutional:       General: He is not in acute distress.     Appearance: Normal appearance.   HENT:      Head: Normocephalic.      Right Ear: External ear normal.      Left Ear: External ear normal.      Nose: Nose normal.      Mouth/Throat:      Mouth: Mucous membranes are moist.      Pharynx: Oropharynx is clear.   Eyes: "      General: No scleral icterus.     Extraocular Movements: Extraocular movements intact.      Conjunctiva/sclera: Conjunctivae normal.      Pupils: Pupils are equal, round, and reactive to light.   Cardiovascular:      Rate and Rhythm: Normal rate and regular rhythm.      Pulses: Normal pulses.      Heart sounds: Normal heart sounds.   Pulmonary:      Effort: Pulmonary effort is normal. No respiratory distress.      Breath sounds: Normal breath sounds.   Abdominal:      General: Bowel sounds are normal. There is no distension.      Palpations: Abdomen is soft. There is no mass.      Tenderness: There is no abdominal tenderness. There is no right CVA tenderness or left CVA tenderness.      Hernia: No hernia is present.   Musculoskeletal:         General: No tenderness or signs of injury. Normal range of motion.      Cervical back: Normal range of motion and neck supple.      Right lower leg: No edema.      Left lower leg: No edema.   Lymphadenopathy:      Cervical: No cervical adenopathy.   Skin:     General: Skin is warm and dry.      Capillary Refill: Capillary refill takes less than 2 seconds.      Findings: No erythema or lesion.   Neurological:      General: No focal deficit present.      Mental Status: He is alert and oriented to person, place, and time. Mental status is at baseline.   Psychiatric:         Mood and Affect: Mood normal.         Behavior: Behavior normal.         Thought Content: Thought content normal.         Judgment: Judgment normal.          Labs:   Lab Results   Component Value Date    WBC 4.70 02/20/2025    HGB 15.6 02/20/2025    HCT 46.4 02/20/2025    MCV 89.6 02/20/2025     02/20/2025       CMP  Sodium   Date Value Ref Range Status   02/20/2025 139 136 - 145 mmol/L Final   03/14/2014 138 136 - 145 mmol/L Final     Potassium   Date Value Ref Range Status   02/20/2025 4.4 3.5 - 5.1 mmol/L Final   03/14/2014 4.7 3.5 - 5.1 mmol/L Final     Comment:     *Slightly Hemolyzed      Chloride   Date Value Ref Range Status   02/20/2025 107 98 - 107 mmol/L Final   03/14/2014 103 95 - 110 mmol/L Final     CO2   Date Value Ref Range Status   02/20/2025 26 22 - 29 mmol/L Final   03/14/2014 24 23 - 29 mmol/L Final     Glucose   Date Value Ref Range Status   02/20/2025 95 74 - 100 mg/dL Final   03/14/2014 91 70 - 110 mg/dL Final     BUN   Date Value Ref Range Status   03/14/2014 11 6 - 20 mg/dL Final     Blood Urea Nitrogen   Date Value Ref Range Status   02/20/2025 10.8 8.9 - 20.6 mg/dL Final     Creatinine   Date Value Ref Range Status   02/20/2025 1.32 (H) 0.72 - 1.25 mg/dL Final   03/14/2014 1.2 0.5 - 1.4 mg/dL Final     Calcium   Date Value Ref Range Status   02/20/2025 9.7 8.4 - 10.2 mg/dL Final   03/14/2014 9.6 8.7 - 10.5 mg/dL Final     Protein Total   Date Value Ref Range Status   02/20/2025 8.8 (H) 6.4 - 8.3 gm/dL Final     Total Protein   Date Value Ref Range Status   03/14/2014 8.4 6.0 - 8.4 g/dL Final     Albumin   Date Value Ref Range Status   02/20/2025 4.3 3.5 - 5.0 g/dL Final   03/14/2014 4.7 3.5 - 5.2 g/dL Final     Total Bilirubin   Date Value Ref Range Status   03/14/2014 1.5 (H) 0.1 - 1.0 mg/dL Final     Comment:     For infants and newborns, interpretation of results should be based  on gestational age, weight and in agreement with clinical  observations.  Premature Infant recommended reference ranges:  Up to 24 hours.............<8.0 mg/dL  Up to 48 hours............<12.0 mg/dL  3-5 days..................<15.0 mg/dL  6-29 days.................<15.0 mg/dL     Bilirubin Total   Date Value Ref Range Status   02/20/2025 0.5 <=1.5 mg/dL Final     Alkaline Phosphatase   Date Value Ref Range Status   03/14/2014 79 55 - 135 U/L Final     ALP   Date Value Ref Range Status   02/20/2025 78 40 - 150 unit/L Final     AST   Date Value Ref Range Status   02/20/2025 24 5 - 34 unit/L Final   03/14/2014 29 10 - 40 U/L Final     ALT   Date Value Ref Range Status   02/20/2025 14 0 - 55 unit/L  Final   03/14/2014 17 10 - 44 U/L Final     Anion Gap   Date Value Ref Range Status   03/14/2014 11 8 - 16 mmol/L Final     eGFR   Date Value Ref Range Status   02/20/2025 >60 mL/min/1.73/m2 Final     Comment:     Estimated GFR calculated using the CKD-EPI creatinine (2021) equation.     Lab Results   Component Value Date    TSH 1.502 02/20/2025     Hep C Ab Interp   Date Value Ref Range Status   02/20/2025 Nonreactive Nonreactive Final     Syphilis Antibody   Date Value Ref Range Status   02/20/2025 Reactive (A) Nonreactive, Equivocal Final     RPR   Date Value Ref Range Status   02/20/2025 Reactive (A) Non-Reactive Final     RPR Titer   Date Value Ref Range Status   02/20/2025 2 dils (A) (none) Final     Cholesterol Total   Date Value Ref Range Status   02/20/2025 158 <=200 mg/dL Final     Cholesterol   Date Value Ref Range Status   03/14/2014 118 (L) 120 - 199 mg/dL Final     Comment:     The National Cholesterol Education Program (NCEP) has set the  following guidelines (reference ranges) for Cholesterol:  Optimal.....................<200 mg/dL  Borderline High.............200-239 mg/dL  High........................> or = 240 mg/dL     HDL   Date Value Ref Range Status   03/14/2014 63 40 - 75 mg/dL Final     Comment:     The National Cholesterol Education Program (NCEP) has set the  following guidelines (reference values) for HDL Cholesterol:  Low...............<40 mg/dL  Optimal...........>60 mg/dL     HDL Cholesterol   Date Value Ref Range Status   02/20/2025 44 35 - 60 mg/dL Final     Triglycerides   Date Value Ref Range Status   03/14/2014 36 30 - 150 mg/dL Final     Comment:     The National Cholesterol Education Program (NCEP) has set the  following guidelines (reference values) for triglycerides:  Normal......................<150 mg/dL  Borderline High.............150-199 mg/dL  High........................200-499 mg/dL     Triglyceride   Date Value Ref Range Status   02/20/2025 64 34 - 140 mg/dL Final      Total Cholesterol/HDL Ratio   Date Value Ref Range Status   03/14/2014 1.9 (L) 2.0 - 5.0 Final     Cholesterol/HDL Ratio   Date Value Ref Range Status   02/20/2025 4 0 - 5 Final     Very Low Density Lipoprotein   Date Value Ref Range Status   02/20/2025 13  Final     LDL Cholesterol   Date Value Ref Range Status   02/20/2025 101.00 50.00 - 140.00 mg/dL Final     Comment:     LDL calculated using the Friedewald equation.     Vitamin D   Date Value Ref Range Status   02/20/2025 17 (L) 30 - 80 ng/mL Final     Results for orders placed or performed in visit on 02/20/25   CD4 Lymphocytes   Result Value Ref Range    Patient Age 34     WBC Absolute 4,700 4,500 - 11,500 /mm3    Lymph Percent 50 (H) 28 - 48 %    Lymph Absolute 2,350 1,260 - 5,520 x10(3)/mcL    CD4 % 15.5 %    CD4 Absolute 364 (L) 589 - 1,505 unit/L    T Cell Interp       Normal absolute lymphocyte count with decreased absolute CD4+ lymphocyte count.    Ken Gifford M.D.     Narrative    This test was developed and its performance characteristics determined by [facility]. It has not been cleared or approved by the US Food and Drug Administration. The FDA does not require this test to go through premarket FDA review. This test is used for clinical purposes. It should not be regarded as investigational or for research. This laboratory is certified under the Clinical Laboratory Improvement Amendments (CLIA) as qualified to perform high complexity clinical laboratory testing.     Results for orders placed or performed in visit on 02/20/25   HIV-1 RNA, Quantitative, PCR with Reflex to Genotype   Result Value Ref Range    HIV-1 RNA Detect/Quant, P 115 (A) Undetected copies/mL     Results for orders placed or performed in visit on 02/20/25   Quantiferon Gold TB   Result Value Ref Range    QuantiFERON-Tb Gold Plus Result Negative Negative    TB1 Ag minus Nil Result -0.13 IU/mL    TB2 Ag minus Nil Result -0.05 IU/mL    Mitogen  minus Nil Result 7.67 IU/mL    Nil Result 0.30 IU/mL     Results for orders placed or performed in visit on 08/27/24   C.trach/N.gonor AMP RNA    Specimen: Throat   Result Value Ref Range    Source THROAT     Chlamydia trachomatis amplified RNA Negative Negative    Neisseria gonorrhoeae amplified RNA Negative Negative    SOURCE: THROAT      Results for orders placed or performed in visit on 12/21/22   Urinalysis   Result Value Ref Range    Color, UA Yellow Yellow, Light-Yellow, Dark Yellow, Nichol, Straw    Appearance, UA Clear Clear    Specific Gravity, UA 1.032     pH, UA 5.5 5.0 - 8.5    Protein, UA Trace (A) Negative mg/dL    Glucose, UA Normal Negative, Normal mg/dL    Ketones, UA Negative Negative mg/dL    Blood, UA Negative Negative unit/L    Bilirubin, UA Negative Negative mg/dL    Urobilinogen, UA 1+ (A) 0.2, 1.0, Normal mg/dL    Nitrites, UA Negative Negative    Leukocyte Esterase, UA Negative Negative unit/L    WBC, UA 0-5 None Seen, 0-2, 3-5, 0-5 /HPF    Bacteria, UA None Seen None Seen /HPF    Squamous Epithelial Cells, UA Trace (A) None Seen /HPF    Mucous, UA Few (A) None Seen /LPF    Hyaline Casts, UA None Seen None Seen /lpf    RBC, UA 0-5 None Seen, 0-2, 3-5, 0-5 /HPF       Imaging: Reviewed most recent relevant imaging studies available, notable results highlighted in this note    Medications:     Current Outpatient Medications   Medication Instructions    abacavir-dolutegravir-lamivud (TRIUMEQ) 600- mg Tab 1 tablet, Oral, Daily    ergocalciferol (VITAMIN D2) 50,000 Units, Oral, Every 7 days       Assessment:       1. HIV disease  -     HIV-1 RNA, Quantitative, PCR with Reflex to Genotype; Future; Expected date: 07/03/2025  -     CBC Auto Differential; Future; Expected date: 07/03/2025  -     CD4 Lymphocytes; Future; Expected date: 07/03/2025  -     Comprehensive Metabolic Panel; Future; Expected date: 07/03/2025  -     Discontinue: dxrdgrlgk-obrbwwfh-ihpyfon ala (BIKTARVY) -25 mg (25  kg or greater); Take 1 tablet by mouth once daily. Discontinue Triumeq. Replace with Biktarvy.  Dispense: 90 tablet; Refill: 0  -     abacavir-dolutegravir-lamivud (TRIUMEQ) 600- mg Tab; Take 1 tablet by mouth once daily.  Dispense: 90 tablet; Refill: 1    2. Vitamin D deficiency  -     Vitamin D; Future; Expected date: 07/03/2025  -     ergocalciferol (VITAMIN D2) 50,000 unit Cap; Take 1 capsule (50,000 Units total) by mouth every 7 days.  Dispense: 12 capsule; Refill: 1    3. Renal insufficiency    4. History of syphilis  -     SYPHILIS ANTIBODY (WITH REFLEX RPR); Future; Expected date: 07/03/2025    5. BMI 27.0-27.9,adult    6. Routine screening for STI (sexually transmitted infection)  -     C.trach/N.gonor AMP RNA; Future; Expected date: 07/03/2025  -     Chlamydia/GC, PCR; Future; Expected date: 07/03/2025    7. Cancer screening  -     Cytology, Fluid/Wash/Brush           Plan:      HIV disease  -     HIV-1 RNA, Quantitative, PCR with Reflex to Genotype; Future; Expected date: 07/03/2025  -     CBC Auto Differential; Future; Expected date: 07/03/2025  -     CD4 Lymphocytes; Future; Expected date: 07/03/2025  -     Comprehensive Metabolic Panel; Future; Expected date: 07/03/2025  -     Discontinue: porvzgrds-kfjtbhvj-sragqlc ala (BIKTARVY) -25 mg (25 kg or greater); Take 1 tablet by mouth once daily. Discontinue Triumeq. Replace with Biktarvy.  Dispense: 90 tablet; Refill: 0  -     abacavir-dolutegravir-lamivud (TRIUMEQ) 600- mg Tab; Take 1 tablet by mouth once daily.  Dispense: 90 tablet; Refill: 1  Dx 3/17/16  Initial HIV VL and CD4 misty  OI hx: PJP and disseminated MAC     Adherence and sexual health counseling done.  Use condoms for all sexual encounters.  Blood precautions.   Continue Triumeq as prescribed.  Labs today.  RTC 4 months with Franca for an in office visit      HIV Wellness:  Anal pap: 7/3/25  Oral CT/GC: 7/3/25  Anal CT/GC: 7/3/25  Urine CT/GC: 2/20/25  RPR: 7/3/25  Ophth:  "9/22/23. Scheduled for 7/14/25  Vaccines recommended      Vitamin D deficiency  -     Vitamin D; Future; Expected date: 07/03/2025  -     ergocalciferol (VITAMIN D2) 50,000 unit Cap; Take 1 capsule (50,000 Units total) by mouth every 7 days.  Dispense: 12 capsule; Refill: 1  Restart Ergocalciferol 50,000 units once weekly.  Pt educated on the importance of Vit D to bone and organ health.     Renal insufficiency  Keep hydrated.  Avoid NSAIDs (ibuprofen, naproxen, aleve, advil, toradol or mobic)  Keep BP (goal <130/80) and cholesterol (LDL <100) within recommended goals  Low sodium diet encouraged; 2 grams per day  Increase exercise activity; 30 minutes 3-5 x per week   Maintain a healthy weight  Smoking cessation encouraged   Decrease ETOH intake/encouraged cessation   Medication compliance stressed   Keep all follow up appointments as scheduled     History of syphilis  -     SYPHILIS ANTIBODY (WITH REFLEX RPR); Future; Expected date: 07/03/2025    BMI 27.0-27.9,adult  Increase exercise activity to 30 minutes 3-5 x per week.  Avoid sugar sodas, simple sugars, sweets, excessive rice, pasta, potatoes or bread.  Choose "brown" options when available; practice portion control.  Limit fast foods and fried foods.  Encouraged to eat more portions of fresh fruits and vegetables with lean meats daily (chicken, fish, etc).  Consider permanent healthy lifestyle changes.     Routine screening for STI (sexually transmitted infection)  -     C.trach/N.gonor AMP RNA; Future; Expected date: 07/03/2025  -     Chlamydia/GC, PCR; Future; Expected date: 07/03/2025  Oral and rectal CT/GC collected     Cancer screening  -     Cytology, Fluid/Wash/Brush  Anal pap smear collected        31 minutes of total time spent on the encounter, which includes face to face time and non-face to face time preparing to see the patient (eg, review of tests), Obtaining and/or reviewing separately obtained history, Documenting clinical information in the " electronic or other health record, Independently interpreting results (not separately reported) and communicating results to the patient/family/caregiver, or Care coordination (not separately reported).

## 2025-07-07 ENCOUNTER — RESULTS FOLLOW-UP (OUTPATIENT)
Dept: INFECTIOUS DISEASES | Facility: CLINIC | Age: 35
End: 2025-07-07

## 2025-07-07 ENCOUNTER — LAB VISIT (OUTPATIENT)
Dept: LAB | Facility: HOSPITAL | Age: 35
End: 2025-07-07
Attending: NURSE PRACTITIONER
Payer: COMMERCIAL

## 2025-07-07 DIAGNOSIS — Z86.19 HISTORY OF SYPHILIS: ICD-10-CM

## 2025-07-07 DIAGNOSIS — B20 HIV DISEASE: ICD-10-CM

## 2025-07-07 DIAGNOSIS — E55.9 VITAMIN D DEFICIENCY: ICD-10-CM

## 2025-07-07 LAB
25(OH)D3+25(OH)D2 SERPL-MCNC: 16 NG/ML (ref 30–80)
AGE: 35
ALBUMIN SERPL-MCNC: 4.1 G/DL (ref 3.5–5)
ALBUMIN/GLOB SERPL: 1.1 RATIO (ref 1.1–2)
ALP SERPL-CCNC: 73 UNIT/L (ref 40–150)
ALT SERPL-CCNC: 18 UNIT/L (ref 0–55)
ANION GAP SERPL CALC-SCNC: 6 MEQ/L
AST SERPL-CCNC: 26 UNIT/L (ref 11–45)
BASOPHILS # BLD AUTO: 0.06 X10(3)/MCL
BASOPHILS NFR BLD AUTO: 1 %
BILIRUB SERPL-MCNC: 0.7 MG/DL
BUN SERPL-MCNC: 15.3 MG/DL (ref 8.9–20.6)
CALCIUM SERPL-MCNC: 9.1 MG/DL (ref 8.4–10.2)
CD3+CD4+ CELLS # SPEC: 390 UNIT/L (ref 589–1505)
CD3+CD4+ CELLS NFR BLD: 15.7 %
CHLORIDE SERPL-SCNC: 108 MMOL/L (ref 98–107)
CO2 SERPL-SCNC: 24 MMOL/L (ref 22–29)
CREAT SERPL-MCNC: 1.46 MG/DL (ref 0.72–1.25)
CREAT/UREA NIT SERPL: 10
EOSINOPHIL # BLD AUTO: 0.25 X10(3)/MCL (ref 0–0.9)
EOSINOPHIL NFR BLD AUTO: 4.2 %
ERYTHROCYTE [DISTWIDTH] IN BLOOD BY AUTOMATED COUNT: 12.3 % (ref 11.5–17)
GFR SERPLBLD CREATININE-BSD FMLA CKD-EPI: >60 ML/MIN/1.73/M2
GLOBULIN SER-MCNC: 3.8 GM/DL (ref 2.4–3.5)
GLUCOSE SERPL-MCNC: 125 MG/DL (ref 74–100)
HCT VFR BLD AUTO: 42.9 % (ref 42–52)
HGB BLD-MCNC: 14.6 G/DL (ref 14–18)
IMM GRANULOCYTES # BLD AUTO: 0.01 X10(3)/MCL (ref 0–0.04)
IMM GRANULOCYTES NFR BLD AUTO: 0.2 %
LYMPHOCYTES # BLD AUTO: 2.51 X10(3)/MCL (ref 0.6–4.6)
LYMPHOCYTES # BLD AUTO: 2486.4 X10(3)/MCL (ref 1260–5520)
LYMPHOCYTES NFR BLD AUTO: 42.4 %
LYMPHOCYTES NFR LN MANUAL: 42 % (ref 28–48)
LYMPHOMA - T-CELL MARKERS SPEC-IMP: ABNORMAL
MCH RBC QN AUTO: 30.7 PG (ref 27–31)
MCHC RBC AUTO-ENTMCNC: 34 G/DL (ref 33–36)
MCV RBC AUTO: 90.3 FL (ref 80–94)
MONOCYTES # BLD AUTO: 0.43 X10(3)/MCL (ref 0.1–1.3)
MONOCYTES NFR BLD AUTO: 7.3 %
NEUTROPHILS # BLD AUTO: 2.66 X10(3)/MCL (ref 2.1–9.2)
NEUTROPHILS NFR BLD AUTO: 44.9 %
NRBC BLD AUTO-RTO: 0 %
PLATELET # BLD AUTO: 263 X10(3)/MCL (ref 130–400)
PMV BLD AUTO: 9.4 FL (ref 7.4–10.4)
POTASSIUM SERPL-SCNC: 4.3 MMOL/L (ref 3.5–5.1)
PROT SERPL-MCNC: 7.9 GM/DL (ref 6.4–8.3)
RBC # BLD AUTO: 4.75 X10(6)/MCL (ref 4.7–6.1)
RPR SER QL: REACTIVE
RPR SER-TITR: ABNORMAL {TITER}
SODIUM SERPL-SCNC: 138 MMOL/L (ref 136–145)
T PALLIDUM AB SER QL: REACTIVE
WBC # BLD AUTO: 5.92 X10(3)/MCL (ref 4.5–11.5)
WBC # BLD AUTO: 5920 /MM3 (ref 4500–11500)

## 2025-07-07 PROCEDURE — 85025 COMPLETE CBC W/AUTO DIFF WBC: CPT

## 2025-07-07 PROCEDURE — 80053 COMPREHEN METABOLIC PANEL: CPT

## 2025-07-07 PROCEDURE — 86592 SYPHILIS TEST NON-TREP QUAL: CPT

## 2025-07-07 PROCEDURE — 82306 VITAMIN D 25 HYDROXY: CPT

## 2025-07-07 PROCEDURE — 87536 HIV-1 QUANT&REVRSE TRNSCRPJ: CPT

## 2025-07-07 PROCEDURE — 86780 TREPONEMA PALLIDUM: CPT

## 2025-07-07 PROCEDURE — 86361 T CELL ABSOLUTE COUNT: CPT

## 2025-07-07 PROCEDURE — 36415 COLL VENOUS BLD VENIPUNCTURE: CPT

## 2025-07-08 ENCOUNTER — CLINICAL SUPPORT (OUTPATIENT)
Dept: INFECTIOUS DISEASES | Facility: CLINIC | Age: 35
End: 2025-07-08
Payer: COMMERCIAL

## 2025-07-08 ENCOUNTER — LAB VISIT (OUTPATIENT)
Dept: LAB | Facility: HOSPITAL | Age: 35
End: 2025-07-08
Payer: COMMERCIAL

## 2025-07-08 ENCOUNTER — TELEPHONE (OUTPATIENT)
Dept: INFECTIOUS DISEASES | Facility: CLINIC | Age: 35
End: 2025-07-08
Payer: COMMERCIAL

## 2025-07-08 DIAGNOSIS — R79.89 ELEVATED SERUM CREATININE: Primary | ICD-10-CM

## 2025-07-08 DIAGNOSIS — A53.9 SYPHILIS: Primary | ICD-10-CM

## 2025-07-08 DIAGNOSIS — R79.89 ELEVATED SERUM CREATININE: ICD-10-CM

## 2025-07-08 LAB
BACTERIA #/AREA URNS AUTO: ABNORMAL /HPF
BILIRUB UR QL STRIP.AUTO: NEGATIVE
CLARITY UR: CLEAR
COLOR UR AUTO: ABNORMAL
CREAT UR-MCNC: 292.4 MG/DL (ref 63–166)
GLUCOSE UR QL STRIP: NORMAL
HGB UR QL STRIP: ABNORMAL
HYALINE CASTS #/AREA URNS LPF: ABNORMAL /LPF
KETONES UR QL STRIP: NEGATIVE
LEUKOCYTE ESTERASE UR QL STRIP: NEGATIVE
MUCOUS THREADS URNS QL MICRO: ABNORMAL /LPF
NITRITE UR QL STRIP: NEGATIVE
PH UR STRIP: 6 [PH]
PROT UR QL STRIP: ABNORMAL
PROT UR STRIP-MCNC: 31.3 MG/DL
RBC #/AREA URNS AUTO: ABNORMAL /HPF
SP GR UR STRIP.AUTO: 1.03 (ref 1–1.03)
SPERM URNS QL MICRO: ABNORMAL /HPF
SQUAMOUS #/AREA URNS LPF: ABNORMAL /HPF
URINE PROTEIN/CREATININE RATIO (OLG): 0.1
UROBILINOGEN UR STRIP-ACNC: NORMAL
WBC #/AREA URNS AUTO: ABNORMAL /HPF

## 2025-07-08 PROCEDURE — 99211 OFF/OP EST MAY X REQ PHY/QHP: CPT | Mod: PBBFAC

## 2025-07-08 PROCEDURE — 81015 MICROSCOPIC EXAM OF URINE: CPT

## 2025-07-08 PROCEDURE — 96372 THER/PROPH/DIAG INJ SC/IM: CPT | Mod: PBBFAC

## 2025-07-08 PROCEDURE — 82570 ASSAY OF URINE CREATININE: CPT

## 2025-07-08 RX ADMIN — PENICILLIN G BENZATHINE 2.4 MILLION UNITS: 1200000 INJECTION, SUSPENSION INTRAMUSCULAR at 08:07

## 2025-07-08 NOTE — TELEPHONE ENCOUNTER
Pt had an appt with Nephrology in Oct but missed appt, called nephrology clinic and spoke with Augusta they have an opening on tomorrow she will call pt to see if he can go in.

## 2025-07-08 NOTE — TELEPHONE ENCOUNTER
----- Message from KASIA Gee sent at 7/7/2025  3:23 PM CDT -----  Reviewed labs. RPR is elevated at 16 dils. Pt will need to be treated with Bicillin LA 2.4 million units x 1. I tried to contact patient, but was unable to reach him.  Please reach out to patient.   Both he and his partner will need to be treated.  ----- Message -----  From: Lab, Background User  Sent: 7/7/2025   8:10 AM CDT  To: KASIA Staley

## 2025-07-08 NOTE — PROGRESS NOTES
Patient came in for scheduled Bicillin injection as ordered by Franca Link NP. Bicillin 2.4 million units given IM to both buttocks (1.2 million units per buttock) without difficulty. Patient tolerated well. To contact the clinic prn.

## 2025-07-09 ENCOUNTER — TELEPHONE (OUTPATIENT)
Dept: INFECTIOUS DISEASES | Facility: CLINIC | Age: 35
End: 2025-07-09
Payer: COMMERCIAL

## 2025-07-09 LAB — HIV1 RNA # PLAS NAA DL=20: 86 COPIES/ML

## 2025-07-11 NOTE — TELEPHONE ENCOUNTER
----- Message from KASIA Villa sent at 7/9/2025  2:07 PM CDT -----  Reviewing labs for Franca JAIME as she is out   HIV viral load at 86   Patient is not undetectable.  Please encourage abstinence/sexual precautions as virus is highly transmittable transmittable.  Encourage medication compliance  Thank you  ----- Message -----  From: Lab, Background User  Sent: 7/7/2025   8:10 AM CDT  To: KASIA Staley    
Left message for pt to return call and sent message in portal.  
Left message for pt to return call.  
Patient informed of results and providers recommendations.   
Attending Only

## 2025-07-22 ENCOUNTER — TELEPHONE (OUTPATIENT)
Dept: INFECTIOUS DISEASES | Facility: HOSPITAL | Age: 35
End: 2025-07-22
Payer: COMMERCIAL

## 2025-07-22 ENCOUNTER — TELEPHONE (OUTPATIENT)
Dept: INFECTIOUS DISEASES | Facility: CLINIC | Age: 35
End: 2025-07-22
Payer: COMMERCIAL

## 2025-07-22 DIAGNOSIS — R85.81 ANAL HIGH RISK HUMAN PAPILLOMAVIRUS (HPV) DNA TEST POSITIVE: Primary | ICD-10-CM

## 2025-07-22 DIAGNOSIS — R85.610 PAP SMEAR OF ANUS WITH ASCUS: ICD-10-CM

## 2025-07-22 NOTE — TELEPHONE ENCOUNTER
----- Message from KASIA Villa sent at 7/21/2025  5:41 PM CDT -----  Reviewing labs for Franca JAIME as she is out  Anal pap noted with ASCUS with high risk HPV positive for 16 RNA.  Patient will need referral to colorectal surgery (Brad BATISTA) or General surgery ([facility]).  Can you call patient with results and find out where referral should be sent?  Thank you  ----- Message -----  From: Lab, Background User  Sent: 7/3/2025  12:44 PM CDT  To: KASIA Staley

## 2025-07-22 NOTE — TELEPHONE ENCOUNTER
Referral sent to Dr JAKOB Milton in [place] per patient request for anal pap with ASCUS high risk HPV

## 2025-08-06 ENCOUNTER — OFFICE VISIT (OUTPATIENT)
Dept: NEPHROLOGY | Facility: CLINIC | Age: 35
End: 2025-08-06
Payer: COMMERCIAL

## 2025-08-06 VITALS
WEIGHT: 172.38 LBS | RESPIRATION RATE: 18 BRPM | DIASTOLIC BLOOD PRESSURE: 77 MMHG | OXYGEN SATURATION: 100 % | TEMPERATURE: 98 F | SYSTOLIC BLOOD PRESSURE: 130 MMHG | HEIGHT: 66 IN | BODY MASS INDEX: 27.7 KG/M2 | HEART RATE: 65 BPM

## 2025-08-06 DIAGNOSIS — R31.29 MICROSCOPIC HEMATURIA: ICD-10-CM

## 2025-08-06 DIAGNOSIS — N18.2 CKD STAGE G2/A2, GFR 60-89 AND ALBUMIN CREATININE RATIO 30-299 MG/G: Primary | ICD-10-CM

## 2025-08-06 PROCEDURE — 99214 OFFICE O/P EST MOD 30 MIN: CPT | Mod: PBBFAC

## 2025-08-06 NOTE — PROGRESS NOTES
"  [facility]  Nephrology Clinic    Patient ID: [id number]     Chief Complaint: Establish Care and renal insufficiency (/New pt, referred, took meds, no renal hx//)      HPI:     Azael Hodge is a 35 y.o. Black or  male who presents to nephrology clinic for management to establish care for renal insufficiency. Patient has pertinent chronic conditions that include AIDS and Syphilis. Denies any issues.    Past Medical History:   Diagnosis Date    ADHD (attention deficit hyperactivity disorder)     Ages 5 through 13.  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     Mild sleep apnea     Pneumonia, unspecified organism     Syphilis, unspecified         Past Surgical History:   Procedure Laterality Date    BIOPSY      of abdomen        Social History     Tobacco Use    Smoking status: Never    Smokeless tobacco: Never   Vaping Use    Vaping status: Never Used   Substance and Sexual Activity    Alcohol use: Not Currently     Alcohol/week: 1.0 standard drink of alcohol     Types: 1 Shots of liquor per week     Comment: seldom    Drug use: Never    Sexual activity: Not Currently     Partners: Male     Birth control/protection: Abstinence        Current Outpatient Medications   Medication Instructions    abacavir-dolutegravir-lamivud (TRIUMEQ) 600- mg Tab 1 tablet, Oral, Daily    ergocalciferol (VITAMIN D2) 50,000 Units, Oral, Every 7 days       Review of patient's allergies indicates:   Allergen Reactions    Banana      Other reaction(s): Pharyngeal edema    Benzodiazepines Hallucinations          Review of Systems:  12 point review of systems conducted, negative except as stated in the history of present illness. See HPI for details.    Physical Exam:  Visit Vitals  /77   Pulse 65   Temp 97.9 °F (36.6 °C) (Oral)   Resp 18   Ht 5' 5.75" (1.67 m)   Wt 78.2 kg (172 lb 6.4 oz)   SpO2 100%   BMI 28.04 kg/m²     General appearance: Patient is in no acute distress.  Skin: No rashes " or wounds.  HEENT: PERRLA, EOMI, no scleral icterus, no JVD. Neck is supple.  Chest: Respirations are unlabored. Lungs sounds are clear.   Heart: S1, S2.   Abdomen: Benign.  : Deferred.  Extremities: No edema, peripheral pulses are palpable.   Neuro: No focal deficits.     Laboratory Data:    Lab Results   Component Value Date    WBC 5.92 07/07/2025    HGB 14.6 07/07/2025    HCT 42.9 07/07/2025     07/07/2025    IRON 108 01/18/2022    TIBC 243 (L) 01/18/2022    LABIRON 44 01/18/2022    FERRITIN 911.56 (H) 01/18/2022    FOLATE 10.4 11/30/2021    [id number] 681 11/30/2021     (H) 11/30/2021     07/07/2025    K 4.3 07/07/2025    CO2 24 07/07/2025    BUN 15.3 07/07/2025    CREATININE 1.46 (H) 07/07/2025    EGFRNORACEVR >60 07/07/2025    CALCIUM 9.1 07/07/2025    ALKPHOS 73 07/07/2025    ALBUMIN 4.1 07/07/2025    BILIDIR 0.2 04/25/2022    IBILI 0.10 04/25/2022    AST 26 07/07/2025    ALT 18 07/07/2025    MG 1.90 11/30/2021    PHOS 4.3 10/28/2021      Lab Results   Component Value Date    HGBA1C 5.4 02/20/2025    [id number] 16 (L) 07/07/2025    HEPCAB Nonreactive 02/20/2025    HEPBSAB Reactive (A) 10/15/2020     Urine:  Lab Results   Component Value Date    APPEARANCEUA Clear 07/08/2025    SGUA 1.034 (H) 07/08/2025    PROTEINUA 1+ (A) 07/08/2025    KETONESUA Negative 07/08/2025    LEUKOCYTESUR Negative 07/08/2025    RBCUA 11-20 (A) 07/08/2025    WBCUA 0-5 07/08/2025    BACTERIA Many (A) 07/08/2025    SQEPUA None Seen 07/08/2025    HYALINECASTS None Seen 07/08/2025    CREATRANDUR 292.4 (H) 07/08/2025    PROTEINURINE 31.3 07/08/2025    UPROTCREA 0.1 07/08/2025         Imaging:  CT chest, abdomen common pelvis     CLINICAL HISTORY:  Lymphadenopathy     COMPARISON:  10/03/2023     TECHNIQUE:  Routine CT of the chest, abdomen common pelvis performed intravenous contrast     Total DLP: 722 mGy.cm     Automatic exposure control was utilized to reduce the patient's dose     FINDINGS:  Chest: Multiple  subcentimeter and borderline prominent bilateral axillary lymph nodes are again noted..  Largest lymph node measures 11 mm in short axis diameter on image number 50 of series 5, unchanged.  No mediastinal or hilar lymphadenopathy.  The heart is normal in size.  No pericardial pleural effusion.     The central airways are patent and unremarkable.  There is a stable 6.5 mm nodule in the right lung apex on image 13 of series 3.  No confluent airspace disease consolidation.     Abdomen pelvis: Spleen is normal in size.  Pancreas, liver, gallbladder, adrenal glands, and kidneys appear unremarkable.     The abdominal is normal caliber.  No abdominopelvic adenopathy.  There is small fat containing umbilical hernia.     The bowel loops are normal caliber without high-grade obstruction or bowel inflammatory changes.  No free air, free fluid, or fluid collection.  There is stable borderline prominent right inguinal lymph node measuring 1.2 cm in short axis diameter as seen on image number 199 of series 2.     Musculoskeletal: No osseous destructive process.     Impression:     Stable borderline prominent right inguinal and axillary lymph nodes.        Electronically signed by:Reymundo Urena MD  Date:                                            02/24/2024  Time:                                           09:18    Impression:    ICD-10-CM ICD-9-CM   1. CKD stage G2/A2, GFR 60-89 and albumin creatinine ratio  mg/g  N18.2 585.2   2. Microscopic hematuria  R31.29 599.72        Plan:  1. 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   After discussing the risks, benefits, and limitations of  genetic testing, patient elected to proceed with [facility] panel. Based on the KDIGO guidelines, patient meets the clinical criteria required for coverage of multi-gene panel test. The results could affect patient's clinical management recommendations. Follow up in about 2 months (around 10/6/2025) for with labwork prior to visit.  to discuss the results. Medical management recommendations will be made based on the result of patient's genetic analysis.  Continue renal sparing activities:  -2 g a day dietary sodium restriction  -maintain healthy weight  stay well hydrated (drink water only, avoid juices, sweet tea, and sodas)  -ask about staying up-to-date on vaccinations (flu vaccine, pneumonia vaccine, hepatitis B vaccine)  -avoid excessive use of NSAIDs (ibuprofen, naproxen, Aleve, Advil, Toradol, Mobic), take Tylenol as needed for headache or mild pain  -take cholesterol lowering medications if prescribed (LDL goal less than 100)          2. Microscopic hematuria  Will recheck UA in two months.     Follow up in about 2 months (around 10/6/2025) for with labwork prior to visit.     No orders of the defined types were placed in this encounter.       KASIA Lloyd  [facility]

## 2025-08-12 ENCOUNTER — PATIENT MESSAGE (OUTPATIENT)
Dept: INFECTIOUS DISEASES | Facility: CLINIC | Age: 35
End: 2025-08-12
Payer: COMMERCIAL

## 2025-08-21 ENCOUNTER — TELEPHONE (OUTPATIENT)
Dept: INTERNAL MEDICINE | Facility: CLINIC | Age: 35
End: 2025-08-21
Payer: COMMERCIAL